# Patient Record
Sex: FEMALE | URBAN - METROPOLITAN AREA
[De-identification: names, ages, dates, MRNs, and addresses within clinical notes are randomized per-mention and may not be internally consistent; named-entity substitution may affect disease eponyms.]

---

## 2017-02-15 ENCOUNTER — TRANSFERRED RECORDS (OUTPATIENT)
Dept: HEALTH INFORMATION MANAGEMENT | Facility: CLINIC | Age: 47
End: 2017-02-15

## 2017-03-02 ENCOUNTER — TRANSFERRED RECORDS (OUTPATIENT)
Dept: HEALTH INFORMATION MANAGEMENT | Facility: CLINIC | Age: 47
End: 2017-03-02

## 2017-03-13 ENCOUNTER — MEDICAL CORRESPONDENCE (OUTPATIENT)
Dept: HEALTH INFORMATION MANAGEMENT | Facility: CLINIC | Age: 47
End: 2017-03-13

## 2017-04-27 ENCOUNTER — CARE COORDINATION (OUTPATIENT)
Dept: GASTROENTEROLOGY | Facility: CLINIC | Age: 47
End: 2017-04-27

## 2017-04-27 NOTE — PROGRESS NOTES
New referral records reviewed on 4/24/2017 by Dr. Springer.    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    Called [facility] and left a message with Trista Bartlett CNP nurse with the recommendations from Dr. Springer.  Asked that the nurse contact the patient and inform the patient.    Contact information left if any questions/concerns.    Claudia May LPN  Advanced [facility]~ Panc/Bili  Dr. Springer, Dr. Dixon & Dr. Walter  [phone]

## 2017-12-06 PROBLEM — G89.29 ABDOMINAL PAIN, CHRONIC, RIGHT UPPER QUADRANT: Status: ACTIVE | Noted: 2017-12-06

## 2017-12-06 PROBLEM — R10.11 ABDOMINAL PAIN, CHRONIC, RIGHT UPPER QUADRANT: Status: ACTIVE | Noted: 2017-12-06

## 2017-12-06 PROBLEM — Z79.891 LONG-TERM CURRENT USE OF OPIATE ANALGESIC: Status: ACTIVE | Noted: 2017-12-06

## 2018-01-17 ENCOUNTER — TELEPHONE (OUTPATIENT)
Dept: NEUROLOGY | Facility: CLINIC | Age: 48
End: 2018-01-17

## 2018-01-17 NOTE — TELEPHONE ENCOUNTER
"I called Ivonne Giron to discuss this further.  I have also called Edilson Vides, our Botox vendor, and left a message for him to please return my call regarding a patient with possible allergic reaction after her botox injection.    Ivonne indicates she also had an unusual reaction to the botox injection at her September visit.  She said she felt a sharp pain in her head, became dizzy, and \"couldn't see\".  I was there that day witnessing the procedure, but do not recall a reaction, or at least a verbalization from her of any problems.  She said she had to pull off the road because she felt unsafe to drive.  She called the office about 30 minutes after the procedure was done, and spoke with Dr. Man.  He thought that she sounded like she was having a migraine headache but that the botox was working to minimize the symptomology.    Her phone call today was to document that her last botox injections were on 12/28/2017, and beginning on January 10th, 2018, she developed redness and edema in both eyelids, with droopiness of the eyelids \"draping over her eyelashes\".  She denies SOB, hives, tongue swelling, or any other symptoms.  She is only having these symptoms at her eyes, not at other botox injection sites.      She also said she has an allergy to pesticides, and when they spray around her house, it causes her to have a reaction.      She has been getting botox injections for headaches for approximately 13 years.      She has been taking benedryl for a week at the advice of her PCP, and was advised to call us.    The botox literature indicates the following regarding hypersensitivity reactions:  \"Serious and or immediate hypersensitivity reactions have been reported.  These reactions including anaphylaxis, serum sickness, urticaria, soft tissue edema, and dyspnea.  If such a reaction occurs further injection of Botox should be discontinued and appropriate medical therapy immediately instituted.  One fatal " "case of anaphylaxis has been reported in which lidocaine was used as the diluent and consequently the causal agent cannot reliably be determined.\"    I indicated that she should continue taking benedryl until the reaction stops.  I also indicated that I would discuss it with the botox supplier, but that we likely cannot continue to give her botox injections for her headaches.  She is very concerned about this, as there were no other modalities for her headaches.      "

## 2018-01-17 NOTE — TELEPHONE ENCOUNTER
I called her back and left message to call back times 2. Was finally able to reach her. She said it started about a week ago. She had Botox 12/28/17 and has been receiving it for several years. She said it started with itching of her eyes, redness. She has been using warm wash cloths. She said her eyelids are droopy and you can hardly see her eyes. I told her she should see Provider close to home and she said she did and they told her to call us as they are not familiar with side effects of Botox. I told her I will confer with Dr. Grimm.

## 2018-01-18 ENCOUNTER — PROCEDURE VISIT (OUTPATIENT)
Dept: PAIN MEDICINE | Facility: CLINIC | Age: 48
End: 2018-01-18
Attending: ANESTHESIOLOGY
Payer: COMMERCIAL

## 2018-01-18 VITALS
OXYGEN SATURATION: 94 % | DIASTOLIC BLOOD PRESSURE: 78 MMHG | HEART RATE: 58 BPM | SYSTOLIC BLOOD PRESSURE: 123 MMHG | RESPIRATION RATE: 16 BRPM

## 2018-01-18 DIAGNOSIS — R10.11 ABDOMINAL PAIN, CHRONIC, RIGHT UPPER QUADRANT: ICD-10-CM

## 2018-01-18 DIAGNOSIS — M79.18 MYOFASCIAL MUSCLE PAIN: Primary | ICD-10-CM

## 2018-01-18 DIAGNOSIS — G89.29 ABDOMINAL PAIN, CHRONIC, RIGHT UPPER QUADRANT: ICD-10-CM

## 2018-01-18 PROCEDURE — 76882 US LMTD JT/FCL EVL NVASC XTR: CPT

## 2018-01-18 PROCEDURE — 2500000200 HC RX 250 WO HCPCS: Performed by: ANESTHESIOLOGY

## 2018-01-18 PROCEDURE — 6360000200 HC RX 636 W HCPCS (ALT 250 FOR IP)

## 2018-01-18 PROCEDURE — 20552 NJX 1/MLT TRIGGER POINT 1/2: CPT

## 2018-01-18 RX ORDER — GABAPENTIN 300 MG/1
300 CAPSULE ORAL 3 TIMES DAILY
COMMUNITY
End: 2018-03-06 | Stop reason: SDUPTHER

## 2018-01-18 RX ORDER — TRIAMCINOLONE ACETONIDE 40 MG/ML
40 INJECTION, SUSPENSION INTRA-ARTICULAR; INTRAMUSCULAR ONCE
Status: COMPLETED | OUTPATIENT
Start: 2018-01-18 | End: 2018-01-18

## 2018-01-18 RX ORDER — BUPIVACAINE HYDROCHLORIDE 5 MG/ML
30 INJECTION, SOLUTION EPIDURAL; INTRACAUDAL ONCE
Status: COMPLETED | OUTPATIENT
Start: 2018-01-18 | End: 2018-01-18

## 2018-01-18 RX ADMIN — TRIAMCINOLONE ACETONIDE 40 MG: 40 INJECTION, SUSPENSION INTRA-ARTICULAR; INTRAMUSCULAR at 09:13

## 2018-01-18 RX ADMIN — BUPIVACAINE HYDROCHLORIDE 30 ML: 5 INJECTION, SOLUTION EPIDURAL; INTRACAUDAL at 09:13

## 2018-01-18 ASSESSMENT — PAIN SCALES - GENERAL: PAINLEVEL: 6

## 2018-01-18 ASSESSMENT — PAIN DESCRIPTION - DESCRIPTORS: DESCRIPTORS: SHARP;DULL

## 2018-01-18 NOTE — PROGRESS NOTES
"Procedure performed:  Trigger point injections of bilateral rectus abdominus muscles under US guidance    Preoperative diagnosis:  1. Myofascial muscle pain    2. Abdominal pain, chronic, right upper quadrant      Postoperative diagnosis:  1. Myofascial muscle pain    2. Abdominal pain, chronic, right upper quadrant      Findings:  Successful procedure under US guidance    Anesthesia:  Local    Complications:  None    History of present illness:  The patient is a 39-year-old female who presents today for trigger point injection under ultrasound guidance in the abdomen.  She has had a couple of injections for what is presumed myofascial pain, including TAP blocks as well as trigger point injections near the umbilicus.  These have been beneficial but only for short periods.  The thought was to focus with the ultrasound into the abdominal wall musculature.  The patient describes pain that starts near her lower sternum and radiates inferiorly to just the above the umbilicus and then outwards.  She indicates it comes and goes.  She does have pain-free periods.  When the pain comes on, which is at least once a day, and last for approximately 15-20 minutes.  She feels like her abdominal wall gets \"tight\".  She takes the tramadol during these times which does help but does not totally alleviate the pain.  She does have a history of cholecystectomy with some scars in the regions of the pain.  On exam she has very mild tenderness in her upper quadrants.  No visual deformity.  No palpable deformity.  Skin is intact.  Well-healed scars. Given my exam findings I do think this is a predominantly myofascial pain.  It is difficult the fact that it waxes and wanes with no constant symptom at hand.  I do think that performing trigger points under ultrasound guidance to effectively target the bilateral rectus abdominis muscles would be an appropriate step.  All risks benefits and alternatives to the procedure were discussed with " patient consent was obtained.    Procedure in detail:  The patient was brought to the fluoroscopy suite where a timeout was performed to ensure correct patient correct procedure. The patient was then placed in the seated position and the trigger points reidentified.  There were a total of 5 trigger points.  These were located within the bilateral rectus abdominus muscles.  The area of the triggerpoints was sterilely prepped with alcohol.  Next, a 25-gauge 1-1/2 inch needle was advanced to no more a depth than 1 inch and then a a total of 9 cc of 0.5% bupivacaine + 40 mg Kenalogwas distributed equally between the trigger points.  Hemostasis was confirmed post procedure.  The patient was observed in the recovery area prior to being discharged in stable condition.    Plan:  CNP , consider repeat if effective.    KODY ELISE MD

## 2018-01-23 DIAGNOSIS — G89.29 ABDOMINAL PAIN, CHRONIC, RIGHT UPPER QUADRANT: ICD-10-CM

## 2018-01-23 DIAGNOSIS — R10.11 ABDOMINAL PAIN, CHRONIC, RIGHT UPPER QUADRANT: ICD-10-CM

## 2018-01-23 DIAGNOSIS — Z79.891 LONG-TERM CURRENT USE OF OPIATE ANALGESIC: ICD-10-CM

## 2018-01-23 NOTE — TELEPHONE ENCOUNTER
Ivonne needs to fill Tramadol on 1-25 because she is taking her mother to [place]. Her fill date is 1-27. She can only get #180 if she fills on 1-25. I asked her to call me back so I could explain this. Ivonne said she will fill on 1-27 instead.

## 2018-01-24 RX ORDER — TRAMADOL HYDROCHLORIDE 50 MG/1
TABLET ORAL
Qty: 240 TABLET | Refills: 0 | Status: SHIPPED | OUTPATIENT
Start: 2018-01-24 | End: 2018-05-26 | Stop reason: ALTCHOICE

## 2018-01-24 RX ORDER — TRAMADOL HYDROCHLORIDE 50 MG/1
TABLET ORAL
Qty: 240 TABLET | Refills: 0 | Status: SHIPPED | OUTPATIENT
Start: 2018-01-24 | End: 2018-03-01 | Stop reason: SDUPTHER

## 2018-01-25 ENCOUNTER — TELEPHONE (OUTPATIENT)
Dept: NEUROLOGY | Facility: CLINIC | Age: 48
End: 2018-01-25

## 2018-01-25 NOTE — TELEPHONE ENCOUNTER
"Franklin Ro,    I spoke with representative from [employer] about Ivonne's two events that are concerning for a reaction to the Botox given at her last visit.      The representative, Maria Isabel, (filling in for Edilson, our regular representative), said that \"eyelid edema\" is listed in the Adverse drug reaction section of the drug information.    Puzzling is that her first reaction in September did not involve eyelid edema, and the reaction occurred within 20-30 minutes of the botox injection treatment done by Dr. Man.  The eyelid edema began on 1/10/2018, nearly two weeks after the botox injection.  It is unclear that this was the causative reason, however, the vendor does not recommend continuation of the botox injections for fear that she may have a more severe reaction such as anaphylaxis.      She will need to resume seeing Dr. Man for her headaches and start oral medications, and should not be scheduled for botox injections, as I think the risk is too great.          Franklin Ro,  I texted him again today to call me back.  Hopefully I'll hear back today or tomorrow.  Thanks  Solange  "

## 2018-01-31 ENCOUNTER — TELEPHONE (OUTPATIENT)
Dept: NEUROLOGY | Facility: CLINIC | Age: 48
End: 2018-01-31

## 2018-01-31 NOTE — TELEPHONE ENCOUNTER
"I spoke with Ivonne today about the conversation I had with the Botox supplier.    I asked how long her eyelids were red and edematous, and she indicated it lasted about 4 days that she needed to take benedryl for the reaction. Again, experienced no SOB, tongue swelling, hives, or other sign of anaphylaxis.    I explained that since the eyelid edema is a listed reaction to botox, and she claims to have felt \"not right\" following a previous treatment, I explained that subsequent exposure to an agent that causes a type IV hypersensitivity reaction can cause severe reaction that requires emergency treatment.  I said that the botox supplier recommended that we do not take that chance and not continue this treatment therapy.    So she is concerned what will be done for her headache treatment.  I was thinking that Dr. Man knows her case best, and I recommend that she followup with an office visit with him to discuss other options.      Dr. Man, is that OK with you?  If so, Ija can you call her to schedule an office visit with Dr. Man?      Let me know what you think,  Solange      "

## 2018-03-01 DIAGNOSIS — R10.11 ABDOMINAL PAIN, CHRONIC, RIGHT UPPER QUADRANT: ICD-10-CM

## 2018-03-01 DIAGNOSIS — R10.10 PAIN OF UPPER ABDOMEN: ICD-10-CM

## 2018-03-01 DIAGNOSIS — Z79.891 LONG-TERM CURRENT USE OF OPIATE ANALGESIC: ICD-10-CM

## 2018-03-01 DIAGNOSIS — G89.29 ABDOMINAL PAIN, CHRONIC, RIGHT UPPER QUADRANT: ICD-10-CM

## 2018-03-01 RX ORDER — GABAPENTIN 300 MG/1
CAPSULE ORAL
Qty: 90 CAPSULE | Refills: 3 | Status: SHIPPED | OUTPATIENT
Start: 2018-03-01 | End: 2018-06-19 | Stop reason: SDUPTHER

## 2018-03-01 RX ORDER — TRAMADOL HYDROCHLORIDE 50 MG/1
TABLET ORAL
Qty: 240 TABLET | Refills: 0 | Status: SHIPPED | OUTPATIENT
Start: 2018-03-01 | End: 2018-05-26 | Stop reason: ALTCHOICE

## 2018-03-01 NOTE — TELEPHONE ENCOUNTER
Her appointment with Roberto is 3-6 so gabapentin can be refilled but wait on Tramadol until appointment

## 2018-03-06 ENCOUNTER — OFFICE VISIT (OUTPATIENT)
Dept: PAIN MEDICINE | Facility: CLINIC | Age: 48
End: 2018-03-06
Attending: ANESTHESIOLOGY
Payer: COMMERCIAL

## 2018-03-06 VITALS
HEART RATE: 70 BPM | DIASTOLIC BLOOD PRESSURE: 85 MMHG | RESPIRATION RATE: 16 BRPM | SYSTOLIC BLOOD PRESSURE: 127 MMHG | OXYGEN SATURATION: 96 %

## 2018-03-06 DIAGNOSIS — G89.29 ABDOMINAL PAIN, CHRONIC, RIGHT UPPER QUADRANT: Primary | ICD-10-CM

## 2018-03-06 DIAGNOSIS — Z79.891 LONG-TERM CURRENT USE OF OPIATE ANALGESIC: ICD-10-CM

## 2018-03-06 DIAGNOSIS — R10.11 ABDOMINAL PAIN, CHRONIC, RIGHT UPPER QUADRANT: Primary | ICD-10-CM

## 2018-03-06 PROCEDURE — 99212 OFFICE O/P EST SF 10 MIN: CPT

## 2018-03-06 RX ORDER — TRAMADOL HYDROCHLORIDE 50 MG/1
50-100 TABLET ORAL EVERY 6 HOURS PRN
Qty: 240 TABLET | Refills: 0 | Status: SHIPPED | OUTPATIENT
Start: 2018-03-27 | End: 2018-05-26 | Stop reason: ALTCHOICE

## 2018-03-06 RX ORDER — TRAMADOL HYDROCHLORIDE 50 MG/1
50-100 TABLET ORAL EVERY 6 HOURS PRN
Qty: 240 TABLET | Refills: 0 | Status: SHIPPED | OUTPATIENT
Start: 2018-05-26 | End: 2018-05-26 | Stop reason: ALTCHOICE

## 2018-03-06 RX ORDER — IBUPROFEN 200 MG
200 TABLET ORAL AS NEEDED
COMMUNITY
End: 2024-12-18 | Stop reason: HOSPADM

## 2018-03-06 RX ORDER — TRAMADOL HYDROCHLORIDE 50 MG/1
50-100 TABLET ORAL EVERY 6 HOURS PRN
Qty: 240 TABLET | Refills: 0 | Status: SHIPPED | OUTPATIENT
Start: 2018-04-26 | End: 2018-05-26 | Stop reason: ALTCHOICE

## 2018-03-06 RX ORDER — BUTALBITAL, ACETAMINOPHEN AND CAFFEINE 50; 325; 40 MG/1; MG/1; MG/1
1 TABLET ORAL EVERY 4 HOURS PRN
COMMUNITY
End: 2020-06-02 | Stop reason: ALTCHOICE

## 2018-03-06 ASSESSMENT — PAIN - FUNCTIONAL ASSESSMENT: PAIN_FUNCTIONAL_ASSESSMENT: 0-10

## 2018-03-06 ASSESSMENT — ENCOUNTER SYMPTOMS
MYALGIAS: 1
CONSTIPATION: 0
DIARRHEA: 0
FEVER: 0
ABDOMINAL PAIN: 1
ABDOMINAL DISTENTION: 0

## 2018-03-06 ASSESSMENT — PAIN DESCRIPTION - DESCRIPTORS: DESCRIPTORS: OTHER (COMMENT)

## 2018-03-06 NOTE — PROGRESS NOTES
Subjective     Patient ID: Ivonne Giron is a 47 y.o. female.    HPI  Here for med review and f/u on injections. She had TPI under US to her abd muscles on 1/18/18.  She now had more needle poke type pain on the sides by her ribs but the middle of her abd felt better.  She was worried because the sharp pain was surprising and not like her usual pain. She isn't thinking it helped due to the new sharp pain but admits the middle of her abd is better.    Meds continue to help with her pain episodes. She asks more questions today about why she has this pain.      Review of Systems   Constitutional: Negative for fever.   Gastrointestinal: Positive for abdominal pain. Negative for abdominal distention, constipation and diarrhea.   Musculoskeletal: Positive for myalgias.       Objective   /85 (BP Location: Right arm, Patient Position: Sitting)   Pulse 70   Resp 16   SpO2 96%       Current Outpatient Prescriptions:   •  butalbital-acetaminophen-caff (FIORICET, ESGIC) -40 mg, Take 1 tablet by mouth every 4 hours., Disp: , Rfl:   •  calcium carbonate-vitamin D3 (CALCIUM 500 WITH D) 500 mg(1,250mg) -400 unit tablet, Take 1 tab/cap by mouth daily., Disp: , Rfl:   •  diclofenac sodium (VOLTAREN) 1 % gel, Apply 2 g topically 4 (four) times a day as needed (pain)., Disp: 1 Tube, Rfl: 3  •  FLUoxetine (PROzac) 20 mg capsule, Take 20 mg by mouth daily., Disp: , Rfl:   •  gabapentin (NEURONTIN) 300 mg capsule, TAKE ONE CAPSULE BY MOUTH THREE TIMES DAILY, Disp: 90 capsule, Rfl: 3  •  hydrochlorothiazide (HYDRODIURIL) 25 mg tablet, Take 10 mg by mouth daily., Disp: , Rfl:   •  ibuprofen (MOTRIN IB) 200 mg tablet, Take 500 mg by mouth as needed., Disp: , Rfl:   •  losartan (COZAAR) 100 mg tablet, Take by mouth daily.  , Disp: , Rfl:   •  prenatal 25-iron fum-folic-dha (PRENATAL-1) -200 mg-mcg-mg capsule, Take 1 capsule by mouth daily., Disp: , Rfl:   •  prenatal vits96-iron fum-folic 27 mg iron- 800 mcg  tablet tablet, Take 1 tablet by mouth daily., Disp: , Rfl:   •  traMADol (ULTRAM 50) 50 mg tablet, Take 2 tablets every 6 hours by oral route., Disp: , Rfl:   •  traMADol (ULTRAM 50) 50 mg tablet, Take 1-2 tablets every 4 hours PRN. Max 8 daily., Disp: 240 tablet, Rfl: 0  •  traMADol (ULTRAM 50) 50 mg tablet, TAKE ONE TO TWO TABLETS BY MOUTH EVERY 4 HOURS AS NEEDED *MAXIMUM  OF  EIGHT  TABLETS  DAILY*, Disp: 240 tablet, Rfl: 0  •  traMADol (ULTRAM 50) 50 mg tablet, Take 1 tablet (50 mg total) by mouth every 4 (four) hours as needed for pain scale 4-7/10, 4-5/8., Disp: 240 tablet, Rfl: 0    Physical Exam   Constitutional: She is oriented to person, place, and time. She appears well-developed and well-nourished. No distress.   HENT:   Head: Normocephalic.   Eyes: Conjunctivae are normal.   Neck: Normal range of motion. Neck supple.   Cardiovascular: Normal rate and regular rhythm.    No murmur heard.  Pulmonary/Chest: Effort normal and breath sounds normal. No respiratory distress.   Abdominal: She exhibits no distension. There is tenderness.       Musculoskeletal: Normal range of motion. She exhibits no edema, tenderness or deformity.        Neurological: She is alert and oriented to person, place, and time.   Skin: Skin is warm and dry. No rash noted.   Psychiatric: She has a normal mood and affect. Her behavior is normal. Judgment and thought content normal.   Nursing note and vitals reviewed.      Assessment/Plan     1. Abdominal pain, chronic, right upper quadrant    2. Long-term current use of opiate analgesic      Continue tramadol as before.  I suggested voltaren gel but it isn't covered so she may try OTC creams.  Discussed the w/u on her pain and how GI ordered MRCP and this wasn't done and her labs arent in the computer either.  It could be myofascial so encouraged cream to area especially since it comes on at different times.     DISCUSSION  Today's plan was a combined effort between the patient and  myself. The patient understood the plan, verbally consented, and agreed to participate. An After Visit Summary with special instructions/information regarding today's office visit was given to the patient (see patient instructions).     The diagnostic assessment has been reviewed. Patient and/or patient's surrogate has expressed a good understanding of the assessment and recommendations from today's visit. There are no apparent barriers to understanding this information. Patient’s questions were addressed.    Patient has been advised to contact this office or the answering service with questions or concerns that may arise. Patient has been advised to follow up with Primary Care Provider for general medical needs.     A total of 15 minutes was required for this visit. Greater than 50% of this time was spent in direct face to face communication with the patient and/or surrogate in order to provide counseling regarding meds and possible creams she can use to her abd.    GOKUL ASHTON, CNP

## 2018-03-08 ENCOUNTER — OFFICE VISIT (OUTPATIENT)
Dept: NEUROLOGY | Facility: CLINIC | Age: 48
End: 2018-03-08
Payer: COMMERCIAL

## 2018-03-08 VITALS — OXYGEN SATURATION: 93 % | DIASTOLIC BLOOD PRESSURE: 85 MMHG | SYSTOLIC BLOOD PRESSURE: 136 MMHG | HEART RATE: 78 BPM

## 2018-03-08 DIAGNOSIS — G89.29 CHRONIC ABDOMINAL PAIN: ICD-10-CM

## 2018-03-08 DIAGNOSIS — G43.019 INTRACTABLE MIGRAINE WITHOUT AURA AND WITHOUT STATUS MIGRAINOSUS: Primary | ICD-10-CM

## 2018-03-08 DIAGNOSIS — R10.9 CHRONIC ABDOMINAL PAIN: ICD-10-CM

## 2018-03-08 DIAGNOSIS — I10 ESSENTIAL HYPERTENSION: ICD-10-CM

## 2018-03-08 DIAGNOSIS — Z86.69 HISTORY OF SEIZURE DISORDER: ICD-10-CM

## 2018-03-08 PROCEDURE — 99215 OFFICE O/P EST HI 40 MIN: CPT | Performed by: PSYCHIATRY & NEUROLOGY

## 2018-03-08 RX ORDER — PROPRANOLOL HYDROCHLORIDE 60 MG/1
60 CAPSULE, EXTENDED RELEASE ORAL DAILY
Qty: 30 CAPSULE | Refills: 11 | Status: SHIPPED | OUTPATIENT
Start: 2018-03-08 | End: 2018-04-17 | Stop reason: ALTCHOICE

## 2018-03-08 ASSESSMENT — ENCOUNTER SYMPTOMS
HEADACHES: 1
BRUISES/BLEEDS EASILY: 1
ABDOMINAL PAIN: 1

## 2018-03-08 ASSESSMENT — PAIN SCALES - GENERAL: PAINLEVEL: 5

## 2018-03-08 NOTE — PROGRESS NOTES
Patient ID: Ivonne Giron is a 47 y.o. female.    HPI  She comes back at this time for further review of migraines.  She notes that she has had difficulty with injections.  Previously she actually done well with the Botox.  I did Botox injections per the standard protocol September 28, 2017.  On her way home she developed bad migraine.  She had called about that but that was just a bad migraine but no other unusual reaction been noted.  She believes that the headaches were pretty decent after the injection in September.    She next received Botox injection December 28 by Dr. Grimm.  I was out of town at that time and the arrangement was per Dr. Grimm to do the injection.  Apparently about a week or more after the Botox injection she began to develop itching of her eyes and redness.  She also noted a drooping of her eyelids.  There were several contacts with the Botox vendors because of possibility of reactions.  Eventually the question of may be not giving further Botox was suggested because of concern with regard to possible adverse reaction to the Botox.    At this point time patient notes that she is having the headaches on almost a daily basis.  Especially frontal and there are 7-10 out of 10 in severity.  The headaches do last all day long.  She has felt that maybe there was little bit of ptosis of her eyelids.    Review of Systems   Gastrointestinal: Positive for abdominal pain.   Neurological: Positive for headaches.   Hematological: Bruises/bleeds easily.   All other systems reviewed and are negative.       Objective:  Chest was clear to auscultation.  Cardiac examination was unremarkable.    Mental status reveals normal speech and language.  Attention and concentration were normal.  Fund of knowledge is normal.  Recent remote recall were normal.  Patient was oriented ×3.    Extraocular movements are full and conjugate.  Facial motor function is good in the upper lower midface.  No ptosis  was noted no nystagmus is noted.  Today I am not really able to detect any clear-cut ptosis.  The upper lid just touches the upper aspect of the iris bilaterally.  I also had patient's daughter went out to get the 's license from the car.  We compared to the patient today compared to the 's license which actually was picture from a November 30.  On the 's license it almost looks like there is a slight amount of ptosis of the right lead.  It appears that she is actually better now if anything then she was before.  I cannot  any clear-cut asymmetry otherwise.  Sternocleidomastoid and trapezius are intact.  Strength is good in the arms and legs.  Tone is normal gait is normal.  No other abnormalities are noted.    Neurologic Exam    Assessment/Plan:        1.  Refractory migraines which had been nicely responsive to Botox    2.  Patient had a migraine after Botox September 28.  3.  Possibility of adverse reaction to Botox with the injection December 28.  She developed redness of her eyes and eyelids with possible reaction about a week or week and a half after the Botox.  4.  Essential tremor    5.  History of chronic chronic abdominal pain    6.  History of seizures      PLAN    1.  Patient has been tried on amitriptyline, Depakote and topiramate in the past.  Today I am going to switch gears and given some propranolol.  She does not believe she is ever had that one.  Start with propranolol 60 mg long-acting daily possibly going up to 60 twice daily.  2.  Follow-up with Mary Jo Fowler within 2 months also for headache management.  Possibility of other medication such as calcium channel blockers or Periactin also could be considered.  For now were going to not go back to the Botox.      The plan for this visit has been reviewed.  The patient and/or the patient's surrogate has expressed a good understanding of the assessment and recommendation from today's visit.  Verbal and/or written  instructions have been included.  No apparent barriers to understanding this information.  Patient's questions were addressed.  A total of 40 minutes was required for this visit.  Greater than 50% of this time was spent in direct face-to-face communication with the patient and/or surrogate caregiver to provide counseling and coordination of care for migraines.

## 2018-03-14 ENCOUNTER — TELEPHONE (OUTPATIENT)
Dept: NEUROLOGY | Facility: CLINIC | Age: 48
End: 2018-03-14

## 2018-03-14 NOTE — TELEPHONE ENCOUNTER
Patient left a message that the new medication is working for the headaches.  It looks like she was given propranolol la 60 mg on 3/8/2018.  I left a message for the patient after visiting with Dr. Man.  She is to give the medication more time to build up a level in her system.  If she continues to have problems we can schedule a follow up with Mary Jo.

## 2018-03-20 PROCEDURE — 87088 URINE BACTERIA CULTURE: CPT | Performed by: NURSE PRACTITIONER

## 2018-03-22 ENCOUNTER — LAB REQUISITION (OUTPATIENT)
Dept: LAB | Facility: HOSPITAL | Age: 48
End: 2018-03-22
Payer: COMMERCIAL

## 2018-03-22 DIAGNOSIS — R35.0 FREQUENCY OF MICTURITION: ICD-10-CM

## 2018-03-24 LAB — BACTERIA UR CULT: NORMAL

## 2018-03-29 ENCOUNTER — TELEPHONE (OUTPATIENT)
Dept: NEUROLOGY | Facility: CLINIC | Age: 48
End: 2018-03-29

## 2018-03-29 NOTE — TELEPHONE ENCOUNTER
Patient calling to ask for something for headaches.  I returned the call.  Patient was advised that she needs to give the propranolol a couple more weeks to build up a level.  Then if she is still having issues then she should be scheduled top see Mary Jo.  Patient stated understanding.

## 2018-04-09 ENCOUNTER — TELEPHONE (OUTPATIENT)
Dept: NEUROLOGY | Facility: CLINIC | Age: 48
End: 2018-04-09

## 2018-04-09 NOTE — TELEPHONE ENCOUNTER
Suggested pt be seen at her local clinic for help with her migraines . She said she was told that she needs to talk with our office . Pt was given an appt next week so she can get a new referal card from [facility] to be seen ,

## 2018-04-09 NOTE — TELEPHONE ENCOUNTER
Pt said that she had also tried botox but had and allergic reaction to it . We are checking on moving her follow up sooner but [facility] may cover remaining cost after medicaid.  will check on this .

## 2018-04-09 NOTE — TELEPHONE ENCOUNTER
Pt called . She wants something for her headaches. She has used all the propranolol and it didn't help . She has a follow up with Mary Jo Fowler CNP in may . Told pt Mary Jo cannot give her anything until she is seen, Will ask scheduling to get her in sooner if possible.

## 2018-04-17 ENCOUNTER — OFFICE VISIT (OUTPATIENT)
Dept: NEUROLOGY | Facility: CLINIC | Age: 48
End: 2018-04-17
Payer: COMMERCIAL

## 2018-04-17 VITALS
BODY MASS INDEX: 35.63 KG/M2 | RESPIRATION RATE: 16 BRPM | DIASTOLIC BLOOD PRESSURE: 77 MMHG | HEIGHT: 67 IN | HEART RATE: 82 BPM | OXYGEN SATURATION: 94 % | WEIGHT: 227 LBS | SYSTOLIC BLOOD PRESSURE: 124 MMHG

## 2018-04-17 DIAGNOSIS — G43.019 INTRACTABLE MIGRAINE WITHOUT AURA AND WITHOUT STATUS MIGRAINOSUS: Primary | ICD-10-CM

## 2018-04-17 PROCEDURE — 99214 OFFICE O/P EST MOD 30 MIN: CPT | Performed by: NURSE PRACTITIONER

## 2018-04-17 RX ORDER — CYPROHEPTADINE HYDROCHLORIDE 4 MG/1
4 TABLET ORAL 3 TIMES DAILY PRN
Qty: 30 TABLET | Refills: 1 | Status: SHIPPED | OUTPATIENT
Start: 2018-04-17 | End: 2018-04-19 | Stop reason: SDUPTHER

## 2018-04-17 ASSESSMENT — ENCOUNTER SYMPTOMS
COLOR CHANGE: 0
DYSURIA: 0
SHORTNESS OF BREATH: 0
FEVER: 0
VOMITING: 0
HEADACHES: 1
ARTHRALGIAS: 0
BACK PAIN: 0
ABDOMINAL PAIN: 0
CHILLS: 0
HEMATURIA: 0
COUGH: 0
SEIZURES: 0
PALPITATIONS: 0
SORE THROAT: 0
EYE PAIN: 0
DIZZINESS: 1

## 2018-04-17 ASSESSMENT — PAIN SCALES - GENERAL: PAINLEVEL: 5

## 2018-04-17 NOTE — PROGRESS NOTES
Headache Exam    04/17/18  2:03 PM    Chief Complaint   Patient presents with   • Migraine       HPI:  Ivonne Miriam Lynnette Giron is a 47 y.o. female who presents to the clinic for follow up for migraine.     Todays patient notes that she is having the headaches on almost a daily basis.  Especially frontal area they are 7-10 out of 10 in severity. The pain is throbbing and stabbing.The headaches do last all day long. She is positive for sensitivity to light and sound. She is nauseated with these episodes. She notes that she had the worst headache after her last botox treatment, but the headache went away in about two hours.    At her last appointment approximately 2 months ago Dr. Man put her on propanolol, she notes no side effects however the medication did not appear to help her.  Her last migraine was 2 days ago at that time she took Motrin and stayed in a dark quiet place.      Histories:    Medical:    Past Medical History:   Diagnosis Date   • Abdominal pain, chronic, generalized    • Accelerated essential hypertension    • Chronic pain disorder    • Intercostal neuropathic pain    • Migraine    • Pain     surgical sight pain since gallbladder surgery         Surgical:    Past Surgical History:   Procedure Laterality Date   • CHOLECYSTECTOMY           Family:    Family History   Problem Relation Age of Onset   • Arthritis Mother    • Hypertension Father    • Hypertension Other    • Tuberculosis Other    • Arthritis Other    • Diabetes Other    • Heart disease Other    • Migraines Other    • Migraines Mother's Sister          Social:    Social History     Social History   • Marital status: Single     Spouse name: N/A   • Number of children: N/A   • Years of education: N/A     Occupational History   • Not on file.     Social History Main Topics   • Smoking status: Never Smoker   • Smokeless tobacco: Never Used   • Alcohol use No   • Drug use: No   • Sexual activity: Defer     Other Topics Concern   • Not on  file     Social History Narrative   • No narrative on file       Allergies:    Allergies   Allergen Reactions   • Indomethacin      HIVES   • Prednisone      N/V   • Sulfa (Sulfonamide Antibiotics)      HIVES       Current Medications:    Current Outpatient Prescriptions   Medication Sig Dispense Refill   • butalbital-acetaminophen-caff (FIORICET, ESGIC) -40 mg Take 1 tablet by mouth every 4 (four) hours as needed.       • calcium carbonate-vitamin D3 (CALCIUM 500 WITH D) 500 mg(1,250mg) -400 unit tablet Take 1 tab/cap by mouth daily.     • FLUoxetine (PROzac) 20 mg capsule Take 20 mg by mouth daily.     • gabapentin (NEURONTIN) 300 mg capsule TAKE ONE CAPSULE BY MOUTH THREE TIMES DAILY 90 capsule 3   • ibuprofen (MOTRIN IB) 200 mg tablet Take 200 mg by mouth as needed.       • losartan (COZAAR) 100 mg tablet Take by mouth daily.       • prenatal 25-iron fum-folic-dha (PRENATAL-1) -200 mg-mcg-mg capsule Take 1 capsule by mouth daily.     • traMADol (ULTRAM 50) 50 mg tablet Take 1-2 tablets every 4 hours PRN. Max 8 daily. 240 tablet 0   • traMADol (ULTRAM 50) 50 mg tablet TAKE ONE TO TWO TABLETS BY MOUTH EVERY 4 HOURS AS NEEDED *MAXIMUM  OF  EIGHT  TABLETS  DAILY* 240 tablet 0   • cyproheptadine (PERIACTIN) 4 mg tablet Take 1 tablet (4 mg total) by mouth 3 (three) times a day as needed for allergies. Take one tab in the morning and one at night for a week. If tolerating my use three times a day. 30 tablet 1   • hydrochlorothiazide (HYDRODIURIL) 25 mg tablet Take 25 mg by mouth daily.       • traMADol (ULTRAM 50) 50 mg tablet Take 1 tablet (50 mg total) by mouth every 4 (four) hours as needed for pain scale 4-7/10, 4-5/8. 240 tablet 0   • traMADol (ULTRAM 50) 50 mg tablet Take 1-2 tablets ( mg total) by mouth every 6 (six) hours as needed (pain). 240 tablet 0   • [START ON 4/26/2018] traMADol (ULTRAM 50) 50 mg tablet Take 1-2 tablets ( mg total) by mouth every 6 (six) hours as needed (pain).  240 tablet 0   • [START ON 5/26/2018] traMADol (ULTRAM 50) 50 mg tablet Take 1-2 tablets ( mg total) by mouth every 6 (six) hours as needed (pain). 240 tablet 0     No current facility-administered medications for this visit.        Home Medications:    Prior to Admission medications    Medication Sig Start Date End Date Taking? Authorizing Provider   butalbital-acetaminophen-caff (FIORICET, ESGIC) -40 mg Take 1 tablet by mouth every 4 (four) hours as needed.     Yes Historical Provider, MD   calcium carbonate-vitamin D3 (CALCIUM 500 WITH D) 500 mg(1,250mg) -400 unit tablet Take 1 tab/cap by mouth daily.   Yes Historical Provider, MD   FLUoxetine (PROzac) 20 mg capsule Take 20 mg by mouth daily.   Yes Historical Provider, MD   gabapentin (NEURONTIN) 300 mg capsule TAKE ONE CAPSULE BY MOUTH THREE TIMES DAILY 3/1/18  Yes Roberto Elliott CNP   ibuprofen (MOTRIN IB) 200 mg tablet Take 200 mg by mouth as needed.     Yes Historical Provider, MD   losartan (COZAAR) 100 mg tablet Take by mouth daily.   1/17/13  Yes Conversion Provider   prenatal 25-iron fum-folic-dha (PRENATAL-1) 30975-200 mg-mcg-mg capsule Take 1 capsule by mouth daily.   Yes Historical Provider, MD   propranolol LA (INDERAL LA) 60 mg 24 hr capsule Take 1 capsule (60 mg total) by mouth daily.  Patient taking differently: Take 60 mg by mouth daily.   3/8/18 3/8/19 Yes Donald Man MD   traMADol (ULTRAM 50) 50 mg tablet Take 1-2 tablets every 4 hours PRN. Max 8 daily. 1/24/18  Yes Roberto Elliott CNP   traMADol (ULTRAM 50) 50 mg tablet TAKE ONE TO TWO TABLETS BY MOUTH EVERY 4 HOURS AS NEEDED *MAXIMUM  OF  EIGHT  TABLETS  DAILY* 3/1/18  Yes Roberto Elliott CNP   hydrochlorothiazide (HYDRODIURIL) 25 mg tablet Take 25 mg by mouth daily.      Historical Provider, MD   traMADol (ULTRAM 50) 50 mg tablet Take 1 tablet (50 mg total) by mouth every 4 (four) hours as needed for pain scale 4-7/10, 4-5/8. 12/27/17 1/26/18  Roberto Elliott CNP   traMADol  "(ULTRAM 50) 50 mg tablet Take 1-2 tablets ( mg total) by mouth every 6 (six) hours as needed (pain). 3/27/18 4/26/18  Roberto Elliott CNP   traMADol (ULTRAM 50) 50 mg tablet Take 1-2 tablets ( mg total) by mouth every 6 (six) hours as needed (pain). 4/26/18 5/26/18  Roberto Elliott CNP   traMADol (ULTRAM 50) 50 mg tablet Take 1-2 tablets ( mg total) by mouth every 6 (six) hours as needed (pain). 5/26/18 6/25/18  Roberto Elliott CNP   diclofenac sodium (VOLTAREN) 1 % gel Apply 2 g topically 4 (four) times a day as needed (pain).  Patient not taking: Reported on 4/17/2018 12/8/17 4/17/18  Roberto Elliott CNP       Review of Systems:  Review of Systems   Constitutional: Negative for chills and fever.   HENT: Negative for ear pain and sore throat.    Eyes: Negative for pain and visual disturbance.   Respiratory: Negative for cough and shortness of breath.    Cardiovascular: Negative for chest pain and palpitations.   Gastrointestinal: Negative for abdominal pain and vomiting.   Genitourinary: Negative for dysuria and hematuria.   Musculoskeletal: Negative for arthralgias and back pain.   Skin: Negative for color change and rash.   Neurological: Positive for dizziness and headaches. Negative for seizures and syncope.   All other systems reviewed and are negative.      OBJECTIVE    Labs:    A1c: No results found for: HGBA1C  Basic: No results found for: NA, K, CL, CO2, BUN, CREATININE, GLUCOSE, CALCIUM  CBC with Platelet:  No results found for: WBC, HGB, HCT, PLT, RBC, MCV, MCH, MCHC, RDW, MPV  Comp: No results found for: NA, K, CL, CO2, BUN, CREATININE, GLUCOSE, CALCIUM, PROT, ALBUMIN, AST, ALT, ALKPHOS, BILITOT  Coags: No results found for: PT, APTT, INR  Lipid: No results found for: CHOL, TRIG, HDLC, LDLC, HDL, LDL  TSH: No results found for: TSH    Vital Signs:  /77 (BP Location: Left arm, Patient Position: Sitting, Cuff Size: Reg)   Pulse 82   Resp 16   Ht 1.702 m (5' 7\")   Wt 103 kg (227 " lb)   SpO2 94%   BMI 35.55 kg/m²     Physical Exam   Constitutional: She appears well-developed and well-nourished.   HENT:   Head: Normocephalic.   Eyes: EOM are normal. Pupils are equal, round, and reactive to light.   Neck: Normal range of motion.   Cardiovascular: Normal rate.    Pulmonary/Chest: Breath sounds normal.   Neurological:     General:  Awake, fully participates, sitting upright, engaged.  MS:  LOC is alert, cognitive function grossly intact  CN I: not tested.   CN II: Pupils equal and reactive. 2mm, reactive to ligh Visual fields are full to confrontation.   CN III, IV,VI: extraocular motility is intact. No nystagmus. Normal smooth pursuit and saccade initiation.   CN V: facial sensation is intact to light touch in V1, V2, and V3 distribution.  CN VII: Muscles of facial expression are strong and symmetric.   CN VIII: hearing is grossly intact.  CN IX,X: Palate elevates symmetrically.   CN XI: sternocleidomastoid and trapezius are normal. CN XII: tongue protrudes in midline.   Motor: Deltoid 5/5             Elbow Flexion/Extension 5/5             Wrist Flexion/Extension 5/5              strength 5/5             Hip, knee flexion/extension 5/5             Plantar-, dorsi - flexion 5/5  Sensory:  No sensory loss to light touch, pinprick, vibration in all UE and LE Dermatomes.    Reflex: Bicep 2/4 bilateral              Brachioradialis 2/4 bilateral              Patellar 2/4 bilateral              Achilles 2/4    Plantar response downgoing bilaterally    Coordination:  Finger-to-nose and heel-to-shin accurate with no ataxia, no dysmetria.    Gait:  symmetric, not wide-based       Ct Abdomen Pelvis Renal Stone Protocol    Result Date: 3/20/2018  Narrative: Exam: CT of the abdomen and pelvis without contrast, renal stone protocol, from 03/20/2018. Clinical History:  Flank pain  stone disease suspected;     Comparison(s): 10/3/2017 Technique: With the patient in the prone  position, helical axial  imaging was performed from the level of the adrenal glands through the bladder. Three millimeter axial reconstructions and coronal reaffirmations were constructed and reviewed. Radiation Reduction Technique: Auto mA utilized Findings: Abdomen CT: The lung bases are clear without pleural effusions. The adrenals appear unremarkable.  The liver contains no focal abnormalities nor biliary dilatation. Spleen, pancreas appears normal. The gallbladder has been surgically resected. The kidneys demonstrate no evidence of stones or hydronephrosis. No renal mass is seen. No free air is identified. The bowel appears unremarkable. Pelvis CT: The urinary bladder appears unremarkable. No masses are identified. Appendix area appears unremarkable. The bowel appears unremarkable. Degenerative disc changes are most prominent at L5-S1.     Impression: 1. No acute abnormality identified.      Assessment:  Problem List Items Addressed This Visit        Nervous    Intractable migraine without aura and without status migrainosus - Primary    Relevant Medications    cyproheptadine (PERIACTIN) 4 mg tablet    Other Relevant Orders    Botox Injection        Orders Placed This Encounter   Procedures   • Botox Injection     Standing Status:   Future     Standing Expiration Date:   4/17/2019     Order Specific Question:   Location     Answer:   Chronic Migraines     Discussion:   47 year old female presents today for follow up of migraine headaches. She was last seen in the clinic by Dr. Man. At that time propanolol was prescribed for preventive migraine control. She states no adverse effects however did not notice a difference in migraine pattern. It was noted that if there was not response to propanolol then Periactin should be trial per Dr Man. I will also order her to be seen by Dr Grimm for Xeomin treatment.    Plan:  1. Stop Propanolol  2. Start cyproheptadine 4 mg twice daily if tolerating in 1 week may go up to 3 times  daily  3.  Appointment with Dr. Grimm in 2 weeks for Botox (Xeomin)    Patient is encouraged to keep a headache log including type of headache, frequency of headache, and any triggers.    Patient encouraged to get plenty of sleep.    She  was counseled about maintaining proper body weight, exercise, and healthy lifestyle.    Patient is to stay active both physically and mentally.     She will follow up in this office in two weeks, sooner if needed. Patients questions were addressed and answered.     The plan for this visit has been reviewed.  The patient and/or the patient's surrogate has expressed a good understanding of the assessment and recommendation from today's visit.  Verbal and/or written instructions have been included.  No apparent barriers to understanding this information.  Patient's questions were addressed.  A total of  25 minutes was required for this visit.  Greater than 50% of this time was spent in direct face-to-face communication with the patient and/or surrogate caregiver to provide counseling and coordination of care for their headaches.     A voice recognition program was used to aid in documentation of the record. Sometimes words are not printed exactly as they were spoken. While efforts were made to carefully edit and correct any inaccuracies, some may be present; please take these into context. Please contact the provider if areas are identified.       Mary Jo Fowler, CNP

## 2018-04-17 NOTE — PATIENT INSTRUCTIONS
1.  Stop propanolol.  2.  Start cyproheptadine 4 mg twice daily if tolerating in 1 week may go up to 3 times daily  3.  Appointment with Dr. Grimm in 2 weeks for Botox

## 2018-04-19 ENCOUNTER — TELEPHONE (OUTPATIENT)
Dept: NEUROLOGY | Facility: CLINIC | Age: 48
End: 2018-04-19

## 2018-04-19 DIAGNOSIS — G43.019 INTRACTABLE MIGRAINE WITHOUT AURA AND WITHOUT STATUS MIGRAINOSUS: ICD-10-CM

## 2018-04-19 RX ORDER — CYPROHEPTADINE HYDROCHLORIDE 4 MG/1
4 TABLET ORAL 3 TIMES DAILY PRN
Qty: 30 TABLET | Refills: 1 | Status: SHIPPED | OUTPATIENT
Start: 2018-04-19 | End: 2018-07-18

## 2018-04-25 ENCOUNTER — TELEPHONE (OUTPATIENT)
Dept: NEUROLOGY | Facility: CLINIC | Age: 48
End: 2018-04-25

## 2018-04-25 NOTE — TELEPHONE ENCOUNTER
I called patient and left a message that I am working with Evelyne in our pre-auth department to see if and what can be done with insurance to try and get authorization for the injections. I let her know I will be in touch once I have more information.

## 2018-04-25 NOTE — TELEPHONE ENCOUNTER
I called them back and told them it will be given by Dr. Grimm in clinic so do not worry about filling it. She has allergic reaction to Botox for Migraine but this may be an alternative.

## 2018-04-25 NOTE — TELEPHONE ENCOUNTER
Patient called stating Medicaid will not pay for the Botox and that she wants to cancel the appointment at this time. I spoke with Mary Jo Fowler CNP for recommendation on follow up appointment; she would like patient to schedule an appointment with Dr. Grimm. Provided this information to patient; she voiced understanding. Botox appointment has been cancelled and patient was transferred to front to schedule follow up with Dr. Grimm.

## 2018-04-26 NOTE — TELEPHONE ENCOUNTER
I spoke with Evelyne and this medication is not on the list of approved medications through [place] Medicaid. She provided them the information we have from the  so they are able to look more into the medication so they are able to take the steps and make determination as to whether Xeomin is something they will add to the list of approved medications in the future.     I called and spoke with Jt with [facility] and he gave me the phone number to [facility] to call and speak with more about the situation. Marcos will be sending me additional information that we are able to provide [place] Medicaid in hopes that this will become an approved medication. I inquired about whether financial assistance is offered and he stated they do offer patient assistance however, it is only for patients who do not have any form of health insurance. Although patient assistance will not be able to be utilized for this situation, he will be sending me the forms for that as well for future use.     I have visited with Mary Jo Fowler CNP to make sure it is fine for the patient to follow up with her in May as Dr. Grimm is booked out several months at this point. She states this is fine.    I called and visited with the patient and provided the information regarding the injections so she is aware that at this time of what the situation is. She voiced understanding of the information.

## 2018-04-26 NOTE — TELEPHONE ENCOUNTER
Hi Maria,  Yes, great to not give up on this and some help for Ivonne.  On my desk, under my computer screen is a business card for a madi named Jt.  He is the  for Xeomin.  Try calling him and explaining that this patient has failed all other meds.  I have spoken to him recently about Ivonne, so he is familiar with her case.  If anyone can offer reimbursements or find a way to make it affordable, it will be him.  Thanks so much for me and Ivonne both.  Solange

## 2018-05-21 ENCOUNTER — TELEPHONE (OUTPATIENT)
Dept: NEUROLOGY | Facility: CLINIC | Age: 48
End: 2018-05-21

## 2018-05-23 NOTE — TELEPHONE ENCOUNTER
Pt did  Not go to ED . Some improvement with headache she is not stuck in bed . She said the increase in medications makes her sleepy. Told pt sometimes it takes awhile to get use to dose increases . Pt told she could f/u with Mary Jo Fowler CNP if she wants to . But she needs to keep her appt with Dr Man.

## 2018-05-25 ENCOUNTER — TELEPHONE (OUTPATIENT)
Dept: NEUROLOGY | Facility: CLINIC | Age: 48
End: 2018-05-25

## 2018-05-25 DIAGNOSIS — R10.11 ABDOMINAL PAIN, CHRONIC, RIGHT UPPER QUADRANT: ICD-10-CM

## 2018-05-25 DIAGNOSIS — Z79.891 LONG-TERM CURRENT USE OF OPIATE ANALGESIC: ICD-10-CM

## 2018-05-25 DIAGNOSIS — G89.29 ABDOMINAL PAIN, CHRONIC, RIGHT UPPER QUADRANT: ICD-10-CM

## 2018-05-25 NOTE — TELEPHONE ENCOUNTER
Pt was in ED in [place] 4-5 hr yest  With migraine . They gave her tordal but migraine came back . They told her to call her neurologist . She did not do increase on medication . The pharmacy said they have to order . Told pt to call them again . Will let Mary Jo Lyons know. Pt asked if there was anything else that can be done ?

## 2018-05-26 ENCOUNTER — HOSPITAL ENCOUNTER (EMERGENCY)
Facility: HOSPITAL | Age: 48
Discharge: HOME | End: 2018-05-26
Attending: EMERGENCY MEDICINE
Payer: COMMERCIAL

## 2018-05-26 ENCOUNTER — APPOINTMENT (OUTPATIENT)
Dept: CT IMAGING | Facility: HOSPITAL | Age: 48
End: 2018-05-26
Payer: COMMERCIAL

## 2018-05-26 VITALS
TEMPERATURE: 100.2 F | WEIGHT: 228 LBS | DIASTOLIC BLOOD PRESSURE: 64 MMHG | OXYGEN SATURATION: 95 % | BODY MASS INDEX: 35.79 KG/M2 | RESPIRATION RATE: 18 BRPM | HEIGHT: 67 IN | HEART RATE: 99 BPM | SYSTOLIC BLOOD PRESSURE: 123 MMHG

## 2018-05-26 DIAGNOSIS — R50.9 FEBRILE ILLNESS: ICD-10-CM

## 2018-05-26 DIAGNOSIS — R11.2 NAUSEA AND VOMITING: ICD-10-CM

## 2018-05-26 DIAGNOSIS — R51.9 HEADACHE: ICD-10-CM

## 2018-05-26 DIAGNOSIS — B34.9 VIRAL SYNDROME: Primary | ICD-10-CM

## 2018-05-26 LAB
ALBUMIN SERPL-MCNC: 3.6 G/DL (ref 3.5–5.3)
ALP SERPL-CCNC: 189 U/L (ref 39–100)
ALT SERPL-CCNC: 82 U/L (ref 0–52)
ANION GAP SERPL CALC-SCNC: 13 MMOL/L (ref 3–11)
APPEARANCE FLD: NORMAL
APTT PPP: 28 SECONDS (ref 24–37)
AST SERPL-CCNC: 65 U/L (ref 0–39)
B PERT DNA SPEC QL NAA+PROBE: NEGATIVE
B PERT DNA SPEC QL NAA+PROBE: NEGATIVE
BACTERIA #/AREA URNS AUTO: ABNORMAL /HPF
BASOPHILS # BLD AUTO: 0.1 10*3/UL
BASOPHILS NFR BLD AUTO: 1 % (ref 0–2)
BILIRUB SERPL-MCNC: 0.66 MG/DL (ref 0–1.4)
BILIRUB UR QL STRIP.AUTO: NEGATIVE
BUN SERPL-MCNC: 9 MG/DL (ref 7–25)
C GATTII+NEOFOR DNA CSF QL NAA+NON-PROBE: NEGATIVE
C PNEUM DNA SPEC QL NAA+PROBE: NEGATIVE
CALCIUM ALBUM COR SERPL-MCNC: 8.7 MG/DL (ref 8.5–10.1)
CALCIUM SERPL-MCNC: 8.4 MG/DL (ref 8.6–10.3)
CHLORIDE SERPL-SCNC: 101 MMOL/L (ref 98–107)
CLARITY UR: CLEAR
CMV DNA # CSF NAA+PROBE: NEGATIVE {COPIES}/ML
CO2 SERPL-SCNC: 21 MMOL/L (ref 21–32)
COLOR FLD: NORMAL
COLOR UR: YELLOW
CREAT SERPL-MCNC: 0.7 MG/DL (ref 0.6–1.2)
E COLI K1 DNA CSF QL NAA+NON-PROBE: NEGATIVE
EOSINOPHIL # BLD AUTO: 0 10*3/UL
EOSINOPHIL NFR BLD AUTO: 0 % (ref 0–3)
ERYTHROCYTE [DISTWIDTH] IN BLOOD BY AUTOMATED COUNT: 13.6 % (ref 11.5–14)
ETHANOL SERPL-MCNC: <10 MG/DL (ref 0–10)
EV RNA # CSF NAA+PROBE: NEGATIVE {COPIES}/ML
FLUAV RNA SPEC NAA+PROBE-ACNC: NEGATIVE
FLUBV RNA SPEC QL NAA+PROBE: NEGATIVE
GFR SERPL CREATININE-BSD FRML MDRD: 89 ML/MIN/1.73M*2
GLUCOSE CSF-MCNC: 66 MG/DL (ref 40–70)
GLUCOSE SERPL-MCNC: 108 MG/DL (ref 70–105)
GLUCOSE UR STRIP.AUTO-MCNC: NEGATIVE MG/DL
GP B STREP DNA CSF QL NAA+NON-PROBE: NEGATIVE
HADV DNA SPEC QL NAA+PROBE: NEGATIVE
HAEM INFLU DNA CSF QL NAA+NON-PROBE: NEGATIVE
HAV IGM SERPL QL IA: NORMAL
HBV CORE IGM SERPL QL IA: NORMAL INDEX VALUE
HBV SURFACE AG SER QL: NORMAL
HCOV 229E RNA SPEC QL NAA+PROBE: NEGATIVE
HCOV HKU1 RNA SPEC QL NAA+PROBE: NEGATIVE
HCOV NL63 RNA SPEC QL NAA+PROBE: NEGATIVE
HCOV OC43 RNA SPEC QL NAA+PROBE: NEGATIVE
HCT VFR BLD AUTO: 39 % (ref 34–45)
HCV AB SER QL: NORMAL
HGB BLD-MCNC: 13.7 G/DL (ref 11.5–15.5)
HGB UR QL STRIP.AUTO: ABNORMAL
HHV6 DNA CSF QL NAA+NON-PROBE: NEGATIVE
HMPV RNA ISLT QL NAA+PROBE: NEGATIVE
HPIV1 RNA SPEC QL NAA+PROBE: NEGATIVE
HPIV2 RNA SPEC QL NAA+PROBE: NEGATIVE
HPIV3 RNA SPEC QL NAA+PROBE: NEGATIVE
HPIV4 RNA SPEC QL NAA+PROBE: NEGATIVE
HSV1 DNA # CSF NAA+PROBE: NEGATIVE {COPIES}/ML
HSV2 DNA CSF QL NAA+PROBE: NEGATIVE
INR PPP: 1.18 (ref 0.9–1.1)
KETONES UR STRIP.AUTO-MCNC: 20 MG/DL
L MONOCYTOG DNA CSF QL NAA+NON-PROBE: NEGATIVE
LACTATE BLDV-SCNC: 0.55 MMOL/L (ref 0.5–1.99)
LEUKOCYTE ESTERASE UR QL STRIP: NEGATIVE
LIPASE SERPL-CCNC: 23 U/L (ref 11–82)
LYMPHOCYTES # BLD AUTO: 1.6 10*3/UL
LYMPHOCYTES NFR BLD AUTO: 13 % (ref 11–47)
M PNEUMO DNA # SPEC NAA+PROBE: NEGATIVE {COPIES}/ML
MCH RBC QN AUTO: 29.2 PG (ref 28–33)
MCHC RBC AUTO-ENTMCNC: 35 G/DL (ref 32–36)
MCV RBC AUTO: 83.5 FL (ref 81–97)
MONOCYTES # BLD AUTO: 1 10*3/UL
MONOCYTES NFR BLD AUTO: 8 % (ref 3–11)
N MEN DNA CSF QL NAA+NON-PROBE: NEGATIVE
NEUTROPHILS # BLD AUTO: 9.5 10*3/UL
NEUTROPHILS NFR BLD AUTO: 78 % (ref 41–81)
NITRITE UR QL STRIP.AUTO: NEGATIVE
PARECHOVIRUS A RNA CSF QL NAA+NON-PROBE: NEGATIVE
PH UR STRIP.AUTO: 6 PH
PLATELET # BLD AUTO: 203 10*3/UL (ref 140–350)
PMV BLD AUTO: 6.6 FL (ref 6.9–10.8)
POTASSIUM SERPL-SCNC: 2.9 MMOL/L (ref 3.5–5.1)
PROT CSF-MCNC: 30 MG/DL (ref 15–45)
PROT SERPL-MCNC: 7.6 G/DL (ref 6–8.3)
PROT UR STRIP.AUTO-MCNC: 30 MG/DL
PROTHROMBIN TIME: 14.9 SECONDS (ref 12.3–14.8)
RBC # BLD AUTO: 4.67 10*6/ΜL (ref 3.7–5.3)
RBC # FLD: 717 CELLS/MM*3
RBC #/AREA URNS AUTO: ABNORMAL /HPF
RSV A RNA SPEC QL NAA+PROBE: NEGATIVE
RV+EV RNA SPEC QL NAA+PROBE: NEGATIVE
S PNEUM DNA CSF QL NAA+NON-PROBE: NEGATIVE
SODIUM SERPL-SCNC: 135 MMOL/L (ref 135–145)
SP GR UR STRIP.AUTO: 1.01 (ref 1–1.03)
SQUAMOUS #/AREA URNS AUTO: ABNORMAL /HPF
UROBILINOGEN UR STRIP.AUTO-MCNC: <2 E.U./DL
VZV DNA CSF QL NAA+NON-PROBE: NEGATIVE
WBC # BLD AUTO: 12.2 10*3/UL (ref 4.5–10.5)
WBC # FLD: 0 CELLS/MM*3
WBC #/AREA URNS AUTO: ABNORMAL /HPF

## 2018-05-26 PROCEDURE — 80074 ACUTE HEPATITIS PANEL: CPT | Performed by: NURSE PRACTITIONER

## 2018-05-26 PROCEDURE — 6370000100 HC RX 637 (ALT 250 FOR IP): Performed by: NURSE PRACTITIONER

## 2018-05-26 PROCEDURE — 87633 RESP VIRUS 12-25 TARGETS: CPT | Performed by: EMERGENCY MEDICINE

## 2018-05-26 PROCEDURE — 70450 CT HEAD/BRAIN W/O DYE: CPT

## 2018-05-26 PROCEDURE — 2500000200 HC RX 250 WO HCPCS: Performed by: NURSE PRACTITIONER

## 2018-05-26 PROCEDURE — 85025 COMPLETE CBC W/AUTO DIFF WBC: CPT | Performed by: EMERGENCY MEDICINE

## 2018-05-26 PROCEDURE — 99285 EMERGENCY DEPT VISIT HI MDM: CPT | Performed by: EMERGENCY MEDICINE

## 2018-05-26 PROCEDURE — 86788 WEST NILE VIRUS AB IGM: CPT | Performed by: EMERGENCY MEDICINE

## 2018-05-26 PROCEDURE — 80320 DRUG SCREEN QUANTALCOHOLS: CPT | Performed by: NURSE PRACTITIONER

## 2018-05-26 PROCEDURE — 6360000200 HC RX 636 W HCPCS (ALT 250 FOR IP): Performed by: EMERGENCY MEDICINE

## 2018-05-26 PROCEDURE — 81001 URINALYSIS AUTO W/SCOPE: CPT | Performed by: EMERGENCY MEDICINE

## 2018-05-26 PROCEDURE — 62270 DX LMBR SPI PNXR: CPT | Performed by: EMERGENCY MEDICINE

## 2018-05-26 PROCEDURE — 83690 ASSAY OF LIPASE: CPT | Performed by: NURSE PRACTITIONER

## 2018-05-26 PROCEDURE — 96375 TX/PRO/DX INJ NEW DRUG ADDON: CPT

## 2018-05-26 PROCEDURE — G0480 DRUG TEST DEF 1-7 CLASSES: HCPCS | Performed by: NURSE PRACTITIONER

## 2018-05-26 PROCEDURE — 84157 ASSAY OF PROTEIN OTHER: CPT | Performed by: EMERGENCY MEDICINE

## 2018-05-26 PROCEDURE — 89050 BODY FLUID CELL COUNT: CPT | Performed by: EMERGENCY MEDICINE

## 2018-05-26 PROCEDURE — 87070 CULTURE OTHR SPECIMN AEROBIC: CPT | Performed by: EMERGENCY MEDICINE

## 2018-05-26 PROCEDURE — 85730 THROMBOPLASTIN TIME PARTIAL: CPT | Performed by: EMERGENCY MEDICINE

## 2018-05-26 PROCEDURE — 87483 CNS DNA AMP PROBE TYPE 12-25: CPT | Performed by: EMERGENCY MEDICINE

## 2018-05-26 PROCEDURE — 85610 PROTHROMBIN TIME: CPT | Performed by: EMERGENCY MEDICINE

## 2018-05-26 PROCEDURE — 82945 GLUCOSE OTHER FLUID: CPT | Performed by: EMERGENCY MEDICINE

## 2018-05-26 PROCEDURE — 80053 COMPREHEN METABOLIC PANEL: CPT | Performed by: EMERGENCY MEDICINE

## 2018-05-26 PROCEDURE — 99285 EMERGENCY DEPT VISIT HI MDM: CPT

## 2018-05-26 PROCEDURE — 83605 ASSAY OF LACTIC ACID: CPT

## 2018-05-26 PROCEDURE — 96374 THER/PROPH/DIAG INJ IV PUSH: CPT

## 2018-05-26 PROCEDURE — 36415 COLL VENOUS BLD VENIPUNCTURE: CPT | Performed by: EMERGENCY MEDICINE

## 2018-05-26 RX ORDER — POTASSIUM CHLORIDE 750 MG/1
40 TABLET, EXTENDED RELEASE ORAL ONCE
Status: COMPLETED | OUTPATIENT
Start: 2018-05-26 | End: 2018-05-26

## 2018-05-26 RX ORDER — MORPHINE SULFATE 4 MG/ML
4 INJECTION, SOLUTION INTRAMUSCULAR; INTRAVENOUS ONCE
Status: COMPLETED | OUTPATIENT
Start: 2018-05-26 | End: 2018-05-26

## 2018-05-26 RX ORDER — ONDANSETRON 4 MG/1
4 TABLET, ORALLY DISINTEGRATING ORAL EVERY 8 HOURS PRN
Qty: 12 TABLET | Refills: 0 | Status: SHIPPED | OUTPATIENT
Start: 2018-05-26 | End: 2019-09-19

## 2018-05-26 RX ORDER — METOCLOPRAMIDE HYDROCHLORIDE 5 MG/ML
10 INJECTION INTRAMUSCULAR; INTRAVENOUS ONCE
Status: COMPLETED | OUTPATIENT
Start: 2018-05-26 | End: 2018-05-26

## 2018-05-26 RX ORDER — TRAMADOL HYDROCHLORIDE 50 MG/1
50 TABLET ORAL EVERY 6 HOURS PRN
Qty: 20 TABLET | Refills: 0 | Status: SHIPPED | OUTPATIENT
Start: 2018-05-26 | End: 2018-06-25 | Stop reason: SDUPTHER

## 2018-05-26 RX ORDER — DIPHENHYDRAMINE HYDROCHLORIDE 50 MG/ML
50 INJECTION INTRAMUSCULAR; INTRAVENOUS ONCE
Status: COMPLETED | OUTPATIENT
Start: 2018-05-26 | End: 2018-05-26

## 2018-05-26 RX ADMIN — DIPHENHYDRAMINE HYDROCHLORIDE 50 MG: 50 INJECTION INTRAMUSCULAR; INTRAVENOUS at 13:39

## 2018-05-26 RX ADMIN — METOCLOPRAMIDE 10 MG: 5 INJECTION, SOLUTION INTRAMUSCULAR; INTRAVENOUS at 13:39

## 2018-05-26 RX ADMIN — POTASSIUM CHLORIDE 40 MEQ: 750 TABLET, FILM COATED, EXTENDED RELEASE ORAL at 15:21

## 2018-05-26 RX ADMIN — MORPHINE SULFATE 4 MG: 4 INJECTION INTRAVENOUS at 16:37

## 2018-05-26 NOTE — ED ATTESTATION NOTE
I performed a history and physical examination of Ivonne Giron and discussed her   management with Advanced Practice Provider, Jaqueline Winslow CNP .  I agree with the history, physical, assessment, and plan of care.  On my face-to-face visit with patient, she is resting after medications, but still with headache.  She was consented for lumbar puncture given the fever persistent headache and neck pain.  Lumbar puncture was performed without complication.  She has been resting comfortably.  Meningitis panel is negative.  No clear etiology of her fever is identified.    Elevation of LFTs.  Hepatitis panel is negative.    However with negative CT, negative LP, I do feel she is still a good candidate for ongoing outpatient management of a likely viral febrile process.  Return precautions have been discussed at length and plan is for discharge home.    Results for orders placed or performed during the hospital encounter of 05/26/18   CBC w/auto differential Blood, Venous   Result Value Ref Range    WBC 12.2 (H) 4.5 - 10.5 10*3/uL    RBC 4.67 3.70 - 5.30 10*6/µL    Hemoglobin 13.7 11.5 - 15.5 g/dL    Hematocrit 39.0 34.0 - 45.0 %    MCV 83.5 81.0 - 97.0 fL    MCH 29.2 28.0 - 33.0 pg    MCHC 35.0 32.0 - 36.0 g/dL    RDW 13.6 11.5 - 14.0 %    Platelets 203 140 - 350 10*3/uL    MPV 6.6 (L) 6.9 - 10.8 fL    Neutrophils% 78 41 - 81 %    Lymphocytes% 13 11 - 47 %    Monocytes% 8 3 - 11 %    Eosinophils% 0 0 - 3 %    Basophils% 1 0 - 2 %    Neutrophils Absolute 9.50 10*3/uL    Lymphocytes Absolute 1.60 10*3/uL    Monocytes Absolute 1.00 10*3/uL    Eosinophils Absolute 0.00 10*3/uL    Basophils Absolute 0.10 10*3/uL   Protime-INR Blood, Venous   Result Value Ref Range    Protime 14.9 (H) 12.3 - 14.8 seconds    INR 1.18 (H) 0.90 - 1.10   PTT (activated partial thromboplastin time) Blood, Venous   Result Value Ref Range    PTT 28 24 - 37 SECONDS   Comprehensive metabolic panel Blood, Venous   Result Value Ref Range    Sodium  135 135 - 145 mmol/L    Potassium 2.9 (LL) 3.5 - 5.1 mmol/L    Chloride 101 98 - 107 mmol/L    CO2 21 21 - 32 mmol/L    Anion Gap 13 (H) 3 - 11 mmol/L    BUN 9 7 - 25 mg/dL    Creatinine 0.7 0.6 - 1.2 mg/dL    Glucose 108 (H) 70 - 105 mg/dL    Calcium 8.4 (L) 8.6 - 10.3 mg/dL    AST 65 (H) 0 - 39 U/L    ALT (SGPT) 82 (H) 0 - 52 U/L    Alkaline Phosphatase 189 (H) 39 - 100 U/L    Total Protein 7.6 6.0 - 8.3 g/dL    Albumin 3.6 3.5 - 5.3 g/dL    Total Bilirubin 0.66 0.00 - 1.40 mg/dL    eGFR 89 >60 mL/min/1.73m*2    Corrected Calcium 8.7 8.5 - 10.1 mg/dL   Glucose, CSF Cerebrospinal Fluid   Result Value Ref Range    Glucose, CSF 66 40 - 70 mg/dL   Protein, CSF Cerebrospinal Fluid   Result Value Ref Range    Protein, CSF 30 15 - 45 mg/dL   CSF cell count w/reflex diff Cerebrospinal Fluid   Result Value Ref Range    Color, CSF Other     Clarity, CSF Slightly Cloudy     WBC, CSF 0 cells/mm*3    RBC,  cells/mm*3   Meningitis/Encephalitis panel, PCR Cerebrospinal Fluid   Result Value Ref Range    E.Coli K1 Negative Negative    H. Influenzae Negative Negative    L. Monocytogenes Negative Negative    N. Meningitidis Negative Negative    S. Agalactiae Negative Negative    S. Pneumoniae Negative Negative    CMV Negative Negative    Enterovirus Negative Negative    HSV1 Negative Negative    HSV2 Negative Negative    HHV6 Negative Negative    Human Parechovirus Negative Negative    Varicella Zoster Negative Negative    C. Neoformans/Benjamin Negative Negative   Lipase Blood, Venous   Result Value Ref Range    Lipase 23 11 - 82 U/L   Hepatitis panel, acute Blood, Venous   Result Value Ref Range    Hepatitis B Surface Ag Non-reactive Non-reactive    Hep A IgM Non-reactive Non-reactive    Hep B Core IgM Non-reactive Non-reactive Index value    Hepatitis C Ab Non-reactive Non-reactive   Alcohol Blood, Venous   Result Value Ref Range    Ethanol Lvl <10 0 - 10 mg/dL   POCT lactic acid (lactate)   Result Value Ref Range    POC  Lactate 0.55 0.50 - 1.99 mmol/L               MD Radha CAMP MD  05/26/18 [id number]

## 2018-05-26 NOTE — DISCHARGE INSTRUCTIONS
Recommend rest and increase oral fluids.  Use pain medication as needed.  Use education as needed for nausea.  Follow-up with primary care provider next week.  Follow-up with pain management for further refills of pain medication.  Return or seek medical attention sooner for new or worsening symptoms.

## 2018-05-26 NOTE — ED PROCEDURE NOTE
Procedure  Lumbar Puncture  Date/Time: 5/26/2018 2:59 PM  Performed by: RADHA TELLES  Authorized by: RADHA TELLES     Consent:     Consent obtained:  Written    Consent given by:  Patient    Risks discussed:  Bleeding, infection, nerve damage, pain and repeat procedure    Alternatives discussed:  No treatment  Pre-procedure details:     Procedure purpose:  Diagnostic  Anesthesia (see MAR for exact dosages):     Anesthesia method:  Local infiltration    Local anesthetic:  Lidocaine 1% w/o epi  Procedure details:     Lumbar space:  L4-L5 interspace    Patient position:  Sitting    Needle gauge:  22    Needle type:  Spinal needle - Quincke tip    Needle length (in):  3.5    Ultrasound guidance: no      Number of attempts:  1    Fluid appearance:  Clear    Tubes of fluid:  4    Total volume (ml):  4  Post-procedure:     Puncture site:  Adhesive bandage applied    Patient tolerance of procedure:  Tolerated well, no immediate complications                 Radha Telles MD  05/26/18 [id number]

## 2018-05-26 NOTE — ED PROVIDER NOTES
HPI:  Chief Complaint   Patient presents with   • Headache     PT  HAS HAD A MIGRAINE HEADACHE SINCE LAST SUNDAY. WAS SEEN IN [place] ON WEDNESDAY, RECVD TORADOL SHOT WITHOUT RELIEF. PT  HAS CHILLS, NAUSEA AND SLIGHT SHORTNESS OF BREATH   • Nausea   • Shortness of Breath     HPI  Patient presents with reports of headache for the past week. Patient  has a history of migraine headaches in the past.  went to the hospital in [place] on Wednesday and was given IM toradol with no improvement of symptoms. States Wednesday night developed fever, nausea, vomiting, shortness of breath, and neck pain.  Patient  she was seen and her primary care provider's office on Thursday, and had a chest x-ray and a CT scan of her chest, which were both normal.  Patient  continues to have fevers, neck pain, nausea and vomiting, and headache.  Reports has vomited 3 times today.  Denies abdominal pain.  Denies visual disturbances.   has had numbness to left hand and this morning which has resolved.  Denies loss of bowel or bladder control.  has been taking tramadol, which she takes for chronic pain, with no improvement of symptoms.     HISTORY:  Past Medical History:   Diagnosis Date   • Abdominal pain, chronic, generalized    • Accelerated essential hypertension    • Chronic pain disorder    • Intercostal neuropathic pain    • Migraine    • Pain     surgical sight pain since gallbladder surgery       Past Surgical History:   Procedure Laterality Date   • CHOLECYSTECTOMY         Family History   Problem Relation Age of Onset   • Arthritis Mother    • Hypertension Father    • Hypertension Other    • Tuberculosis Other    • Arthritis Other    • Diabetes Other    • Heart disease Other    • Migraines Other    • Migraines Mother's Sister        Social History   Substance Use Topics   • Smoking status: Never Smoker   • Smokeless tobacco: Never Used   • Alcohol use No       ROS:  Constitutional: positive  for fever  HEENT: Negative for sore throat, negative for runny nose.  Respiratory: negative for cough, negative for shortness of breath.  Cardiovascular: Negative for chest pain.  GI: Positive for nausea and vomiting.  Negative for abdominal pain.  : Negative for dysuria.  Musculoskeletal: Negative for weakness.  Negative for swelling.  Positive for neck pain.  Neurological: Negative for visual disturbances.  Positive for numbness to left hand, resolved.  Positive for headache.  Integumentary: Negative for rash.  Immunology: Negative for joint swelling.    PHYSICAL EXAM:  ED Triage Vitals   Temp Heart Rate Resp BP SpO2   05/26/18 1234 05/26/18 1234 05/26/18 1234 05/26/18 1234 05/26/18 1234   (!) 39.4 °C (102.9 °F) 105 18 143/93 93 %      Temp Source Heart Rate Source Patient Position BP Location FiO2 (%)   05/26/18 1234 -- 05/26/18 1438 -- --   Oral  Right lateral       Nursing note and vitals reviewed.  Constitutional: appears obese.  Well-hydrated.  Head: Normocephalic and atraumatic.   Eyes: Conjunctiva normal.  PERRLA, EOMI.  Ears: Bilateral EACs and TMs clear.  Nose: No nasal drainage.  Mouth: Mucous membranes moist.  Oropharynx without erythema, no tonsillar enlargement or exudates, uvula midline.  Neck: Supple.  No cervical lymphadenopathy.  Neck tenderness to palpation.  Cardiovascular: Regular rate and rhythm, no murmur.  Pulmonary/Chest: No respiratory distress.  Clear to auscultation bilaterally.  Abdominal: Soft and nontender.  Normal bowel sounds.  Back: Non-tender.  Musculoskeletal: No edema  Neurological: Alert. No focal deficits.  Skin: Skin is warm and dry. No rash noted.   Psychiatric: Normal mood and affect.    MDM: Patient presents with reports of headache for 1 week, with fever, neck pain, nausea and vomiting with headache for the past 4 days.  States uncertain how high fevers have been at home, does not have a thermometer.  Denies visual disturbances.  States had numbness to left hand this  morning, resolved.  Denies weakness to extremities.  Reports nausea with 3 episodes of vomiting today.  Denies abdominal pain.  Discussed patient case with Dr. Telles, attending ED physician, recommends labs, CT head, and lumbar puncture.  Reviewed lab results shows slightly elevated WBC, and potassium 2.9.  Patient given oral potassium.  Patient given IV Benadryl and Reglan.  Reviewed respiratory pathogen panel normal.  Reviewed CT head negative for acute abnormality.  Patient underwent LP per Dr. Telles.  Reviewed CSF cell count negative for WBCs, meningitis/encephalitis panel negative.  Patient given IV morphine for pain.  Reviewed patient case with Dr. Telles, attending ED physician examined patient as well, recommends discharge home.  Patient states is feeling better and agreeable to plan to discharge.  Discussed febrile illness, viral syndrome, nausea and vomiting, and headache.  Recommend patient rest and increase oral fluids.  States is out of tramadol, reports gets refills monthly on the 26th from pain management.  [place], patient has not filled tramadol since 4/26/2018.  Patient prescribed a few tablets of tramadol and recommend follow-up with pain management for further refills of pain medication.  Recommend patient follow-up with primary care provider next week, and encouraged patient to return or seek medical attention sooner for new or worsening symptoms, patient agreeable to plan of care.    Medications   metoclopramide (REGLAN) injection 10 mg (10 mg intravenous Given 5/26/18 1339)   diphenhydrAMINE (BENADRYL) injection 50 mg (50 mg intravenous Given 5/26/18 1339)   potassium chloride (KLOR-CON) CR tablet 40 mEq (40 mEq oral Given 5/26/18 1521)   morphine injection 4 mg (4 mg intravenous Given 5/26/18 1637)       CT head without IV contrast   Final Result   1.   Negative. Nothing acute.             ED Medication Administration from 05/26/2018 1227 to 05/26/2018 1747       Date/Time Order Dose  Route Action Action by     05/26/2018 1339 metoclopramide (REGLAN) injection 10 mg 10 mg intravenous Given Dell, DARRYL     05/26/2018 1339 diphenhydrAMINE (BENADRYL) injection 50 mg 50 mg intravenous Given Daljitosmikaela, DARRYL     05/26/2018 1521 potassium chloride (KLOR-CON) CR tablet 40 mEq 40 mEq oral Given Darwin, SARAH     05/26/2018 1637 morphine injection 4 mg 4 mg intravenous Given JULIUS Olivarez          PROCEDURES:  Procedures    ED COURSE:  ED Course as of May 26 1907   Sat May 26, 2018   1455 Reviewed CT head negative for acute abnormality.   [BB]      ED Course User Index  [BB] Jaqueline Winslow CNP       CLINICAL IMPRESSION:  Final diagnoses:   [B34.9] Viral syndrome   [R51] Headache   [R11.2] Nausea and vomiting   [R50.9] Febrile illness        Jaqueline Winslow CNP  05/26/18 1913

## 2018-05-27 LAB — WNV IGG CSF QL IA: NEGATIVE

## 2018-05-29 LAB
BACTERIA CSF CULT: NORMAL
GRAM STN SPEC: NORMAL
GRAM STN SPEC: NORMAL

## 2018-05-29 RX ORDER — TRAMADOL HYDROCHLORIDE 50 MG/1
TABLET ORAL
Qty: 240 TABLET | Refills: 0 | OUTPATIENT
Start: 2018-05-29

## 2018-06-19 DIAGNOSIS — R10.10 PAIN OF UPPER ABDOMEN: ICD-10-CM

## 2018-06-19 DIAGNOSIS — R10.11 ABDOMINAL PAIN, CHRONIC, RIGHT UPPER QUADRANT: ICD-10-CM

## 2018-06-19 DIAGNOSIS — G89.29 ABDOMINAL PAIN, CHRONIC, RIGHT UPPER QUADRANT: ICD-10-CM

## 2018-06-19 DIAGNOSIS — Z79.891 LONG-TERM CURRENT USE OF OPIATE ANALGESIC: ICD-10-CM

## 2018-06-19 RX ORDER — GABAPENTIN 300 MG/1
CAPSULE ORAL
Qty: 90 CAPSULE | Refills: 3 | OUTPATIENT
Start: 2018-06-19

## 2018-06-19 RX ORDER — TRAMADOL HYDROCHLORIDE 50 MG/1
TABLET ORAL
Qty: 240 TABLET | Refills: 0 | OUTPATIENT
Start: 2018-06-19

## 2018-06-19 RX ORDER — GABAPENTIN 300 MG/1
300 CAPSULE ORAL
Qty: 90 CAPSULE | Refills: 0 | Status: SHIPPED | OUTPATIENT
Start: 2018-06-19 | End: 2018-08-03 | Stop reason: SDUPTHER

## 2018-06-25 DIAGNOSIS — Z79.891 LONG TERM CURRENT USE OF OPIATE ANALGESIC: Primary | ICD-10-CM

## 2018-06-25 RX ORDER — TRAMADOL HYDROCHLORIDE 50 MG/1
50-100 TABLET ORAL EVERY 6 HOURS PRN
Qty: 240 TABLET | Refills: 0 | Status: SHIPPED | OUTPATIENT
Start: 2018-06-26 | End: 2018-07-26 | Stop reason: WASHOUT

## 2018-07-03 ENCOUNTER — OFFICE VISIT (OUTPATIENT)
Dept: PAIN MEDICINE | Facility: CLINIC | Age: 48
End: 2018-07-03
Attending: ANESTHESIOLOGY
Payer: COMMERCIAL

## 2018-07-03 VITALS
RESPIRATION RATE: 18 BRPM | HEART RATE: 76 BPM | OXYGEN SATURATION: 93 % | DIASTOLIC BLOOD PRESSURE: 89 MMHG | SYSTOLIC BLOOD PRESSURE: 129 MMHG

## 2018-07-03 DIAGNOSIS — R10.11 ABDOMINAL PAIN, CHRONIC, RIGHT UPPER QUADRANT: Primary | ICD-10-CM

## 2018-07-03 DIAGNOSIS — Z79.891 LONG-TERM CURRENT USE OF OPIATE ANALGESIC: ICD-10-CM

## 2018-07-03 DIAGNOSIS — G89.29 ABDOMINAL PAIN, CHRONIC, RIGHT UPPER QUADRANT: Primary | ICD-10-CM

## 2018-07-03 PROCEDURE — 99213 OFFICE O/P EST LOW 20 MIN: CPT

## 2018-07-03 RX ORDER — TRAMADOL HYDROCHLORIDE 50 MG/1
50-100 TABLET ORAL EVERY 4 HOURS PRN
Qty: 240 TABLET | Refills: 0 | Status: SHIPPED | OUTPATIENT
Start: 2018-08-25 | End: 2018-07-26 | Stop reason: SDUPTHER

## 2018-07-03 RX ORDER — TRAMADOL HYDROCHLORIDE 50 MG/1
50-100 TABLET ORAL EVERY 4 HOURS PRN
Qty: 240 TABLET | Refills: 0 | Status: SHIPPED | OUTPATIENT
Start: 2018-09-24 | End: 2018-07-26 | Stop reason: SDUPTHER

## 2018-07-03 RX ORDER — TRAMADOL HYDROCHLORIDE 50 MG/1
50-100 TABLET ORAL EVERY 4 HOURS PRN
Qty: 240 TABLET | Refills: 0 | Status: SHIPPED | OUTPATIENT
Start: 2018-07-26 | End: 2018-08-25 | Stop reason: WASHOUT

## 2018-07-03 RX ORDER — BACLOFEN 10 MG/1
10 TABLET ORAL 3 TIMES DAILY PRN
Qty: 90 TABLET | Refills: 0 | Status: SHIPPED | OUTPATIENT
Start: 2018-07-03 | End: 2020-03-06 | Stop reason: WASHOUT

## 2018-07-03 ASSESSMENT — ENCOUNTER SYMPTOMS
DIARRHEA: 0
ABDOMINAL PAIN: 1
HEADACHES: 1
CONSTIPATION: 0

## 2018-07-03 ASSESSMENT — PAIN - FUNCTIONAL ASSESSMENT: PAIN_FUNCTIONAL_ASSESSMENT: 0-10

## 2018-07-03 ASSESSMENT — PAIN DESCRIPTION - DESCRIPTORS: DESCRIPTORS: SHARP

## 2018-07-03 NOTE — PROGRESS NOTES
Subjective     Patient ID: Ivonne Giron is a 47 y.o. female.    HPI  Here for med review.  Today she is having an episode of her abd pain and she has some diaphoresis and some nausea but didn't get sick today.    She has had ICNB before and no relief in the past along with TPI and abd  injection.  She also cannot go to [place] to get this looked at.    Pain Score: 9  Pain Type: Chronic pain (worse 10, best 6)  Pain Location: Abdomen  Pain Radiating Towards:  (radiates up to the rib cage and around the  abdomen)  Pain Descriptors: Sharp  Pain Frequency: Constant/continuous  Pain Onset: Awakened from sleep  Clinical Progression: Gradually worsening  Pain Interventions: Medication (See MAR), Rest, Heat applied      Review of Systems   Gastrointestinal: Positive for abdominal pain. Negative for constipation and diarrhea.   Neurological: Positive for headaches.       Objective   /89 (BP Location: Right arm, Patient Position: Supine)   Pulse 76   Resp 18   SpO2 93%       Current Outpatient Prescriptions:   •  butalbital-acetaminophen-caff (FIORICET, ESGIC) -40 mg, Take 1 tablet by mouth every 4 (four) hours as needed.  , Disp: , Rfl:   •  calcium carbonate-vitamin D3 (CALCIUM 500 WITH D) 500 mg(1,250mg) -400 unit tablet, Take 1 tablet by mouth daily.  , Disp: , Rfl:   •  cyproheptadine (PERIACTIN) 4 mg tablet, Take 1 tablet (4 mg total) by mouth 3 (three) times a day as needed for allergies. Take one tab in the morning and one at night for a week. If tolerating my use three times a day., Disp: 30 tablet, Rfl: 1  •  FLUoxetine (PROzac) 20 mg capsule, Take 20 mg by mouth daily., Disp: , Rfl:   •  gabapentin (NEURONTIN) 300 mg capsule, Take 1 capsule (300 mg total) by mouth 3 (three) times a day., Disp: 90 capsule, Rfl: 0  •  hydrochlorothiazide (HYDRODIURIL) 25 mg tablet, Take 25 mg by mouth daily.  , Disp: , Rfl:   •  ibuprofen (MOTRIN IB) 200 mg tablet, Take 200 mg by mouth as needed.  , Disp: , Rfl:   •   losartan (COZAAR) 100 mg tablet, Take by mouth daily.  , Disp: , Rfl:   •  ondansetron ODT (ZOFRAN-ODT) 4 mg disintegrating tablet, Take 1 tablet (4 mg total) by mouth every 8 (eight) hours as needed for nausea or vomiting., Disp: 12 tablet, Rfl: 0  •  prenatal 25-iron fum-folic-dha (PRENATAL-1) -200 mg-mcg-mg capsule, Take 1 capsule by mouth daily., Disp: , Rfl:   •  traMADol (ULTRAM 50) 50 mg tablet, Take 1-2 tablets ( mg total) by mouth every 6 (six) hours as needed for pain scale 8-10/10., Disp: 240 tablet, Rfl: 0    Imaging:  No results found.      Physical Exam   Constitutional: She is oriented to person, place, and time. She appears well-developed and well-nourished. No distress.   HENT:   Head: Normocephalic.   Eyes: Conjunctivae are normal.   Neck: Neck supple.   Cardiovascular: Normal rate.    Pulmonary/Chest: Effort normal. No respiratory distress.   Abdominal: Soft. She exhibits distension (slight distension). There is tenderness. There is guarding.   Musculoskeletal: Normal range of motion. She exhibits no edema, tenderness or deformity.        Neurological: She is alert and oriented to person, place, and time.   Skin: Skin is warm and dry. No rash noted.   Psychiatric: She has a normal mood and affect. Her behavior is normal. Judgment and thought content normal.   Nursing note and vitals reviewed.      Assessment/Plan     1. Abdominal pain, chronic, right upper quadrant    2. Long-term current use of opiate analgesic      PDMP  reviewed.  MME =40.  Narcan done. Aberrant behavior = no. Affect/Mood = about the same. ADL = independent. Analgesia = adequate. Adverse Side Effects = no. Goals have been addressed and discussed with this patient.  The patient was advised that if at any time they feel oversedated to stop the medication and seek emergency care.    Discussed her prior treatments and she today is having an episode and she does appear ill and needed to lay down during our visit.   Discussed this happens daily and she may need to go to [place] to evaluate this and we discussed if she can even have injections due to derm saying she is allergic to prednisone.   Tizanidine with hives and methocarbomol given, try baclofen and she will take with tramadol and see if it lessens the pain when it comes.  She is able to recover from this episode by the end of the visit but was still in some turmoil but able to walk out of the exam.    DISCUSSION  Today's plan was a combined effort between the patient and myself. The patient understood the plan, verbally consented, and agreed to participate. An After Visit Summary with special instructions/information regarding today's office visit was given to the patient (see patient instructions).     The diagnostic assessment has been reviewed. Patient and/or patient's surrogate has expressed a good understanding of the assessment and recommendations from today's visit. There are no apparent barriers to understanding this information. Patient’s questions were addressed.  I have reviewed the patients current medications using all immediate resources available.     Patient has been advised to contact this office or the answering service with questions or concerns that may arise. Patient has been advised to follow up with Primary Care Provider for general medical needs.     A total of 15 minutes was required for this visit. Greater than 50% of this time was spent in direct face to face communication with the patient and/or surrogate in order to provide counseling regarding options for her pain and looking at old records.     GOKUL ASHTON, CNP

## 2018-07-11 DIAGNOSIS — G43.701 CHRONIC MIGRAINE WITHOUT AURA WITH STATUS MIGRAINOSUS, NOT INTRACTABLE: Primary | ICD-10-CM

## 2018-07-12 NOTE — TELEPHONE ENCOUNTER
Memory test -and Memory clinic -somebody will call your sister to schedule it.  Today a memory test -mild memory problems.  Needs help with finances!!!  Aricept 5 mg tablet to take in the evening.   See us back in 2 weeks.  Alzheimer [employer] online   Assisted living is a good idea.    Patient Education   Donepezil Hydrochloride Oral tablet  What is this medicine?  DONEPEZIL (patel NEP e zil) is used to treat mild to moderate dementia caused by Alzheimer's disease.  This medicine may be used for other purposes; ask your health care provider or pharmacist if you have questions.  What should I tell my health care provider before I take this medicine?  They need to know if you have any of these conditions:  · asthma or other lung disease  · difficulty passing urine  · head injury  · heart disease  · history of irregular heartbeat  · liver disease  · seizures (convulsions)  · stomach or intestinal disease, ulcers or stomach bleeding  · an unusual or allergic reaction to donepezil, other medicines, foods, dyes, or preservatives  · pregnant or trying to get pregnant  · breast-feeding  How should I use this medicine?  Take this medicine by mouth with a glass of water. Follow the directions on the prescription label. You may take this medicine with or without food. Take this medicine at regular intervals. This medicine is usually taken before bedtime. Do not take it more often than directed. Continue to take your medicine even if you feel better. Do not stop taking except on your doctor's advice.  If you are taking the 23 mg donepezil tablet, swallow it whole; do not cut, crush, or chew it.  Talk to your pediatrician regarding the use of this medicine in children. Special care may be needed.  Overdosage: If you think you have taken too much of this medicine contact a poison control center or emergency room at once.  NOTE: This medicine is only for you. Do not share this medicine with others.  What if I miss a dose?  If you  Provider called pt to discuss options   miss a dose, take it as soon as you can. If it is almost time for your next dose, take only that dose, do not take double or extra doses.  What may interact with this medicine?  Do not take this medicine with any of the following medications:  · certain medicines for fungal infections like itraconazole, fluconazole, posaconazole, and voriconazole  · cisapride  · dextromethorphan; quinidine  · dofetilide  · dronedarone  · pimozide  · quinidine  · thioridazine  · ziprasidone  This medicine may also interact with the following medications:  · antihistamines for allergy, cough and cold  · atropine  · bethanechol  · carbamazepine  · certain medicines for bladder problems like oxybutynin, tolterodine  · certain medicines for Parkinson's disease like benztropine, trihexyphenidyl  · certain medicines for stomach problems like dicyclomine, hyoscyamine  · certain medicines for travel sickness like scopolamine  · dexamethasone  · ipratropium  · NSAIDs, medicines for pain and inflammation, like ibuprofen or naproxen  · other medicines for Alzheimer's disease  · other medicines that prolong the QT interval (cause an abnormal heart rhythm)  · phenobarbital  · phenytoin  · rifampin, rifabutin or rifapentine  This list may not describe all possible interactions. Give your health care provider a list of all the medicines, herbs, non-prescription drugs, or dietary supplements you use. Also tell them if you smoke, drink alcohol, or use illegal drugs. Some items may interact with your medicine.  What should I watch for while using this medicine?  Visit your doctor or health care professional for regular checks on your progress. Check with your doctor or health care professional if your symptoms do not get better or if they get worse.  You may get drowsy or dizzy. Do not drive, use machinery, or do anything that needs mental alertness until you know how this drug affects you.  What side effects may I notice from receiving this  medicine?  Side effects that you should report to your doctor or health care professional as soon as possible:  · allergic reactions like skin rash, itching or hives, swelling of the face, lips, or tongue  · changes in vision  · feeling faint or lightheaded, falls  · problems with balance  · redness, blistering, peeling or loosening of the skin, including inside the mouth  · slow heartbeat, or palpitations  · stomach pain  · unusual bleeding or bruising, red or purple spots on the skin  · vomiting  · weight loss  Side effects that usually do not require medical attention (report to your doctor or health care professional if they continue or are bothersome):  · diarrhea, especially when starting treatment  · headache  · indigestion or heartburn  · loss of appetite  · muscle cramps  · nausea  This list may not describe all possible side effects. Call your doctor for medical advice about side effects. You may report side effects to FDA at 2-064-FDA-1489.  Where should I keep my medicine?  Keep out of reach of children.  Store at room temperature between 15 and 30 degrees C (59 and 86 degrees F). Throw away any unused medicine after the expiration date.  NOTE:This sheet is a summary. It may not cover all possible information. If you have questions about this medicine, talk to your doctor, pharmacist, or health care provider. Copyright© 2016 [employer]

## 2018-07-26 DIAGNOSIS — G89.29 ABDOMINAL PAIN, CHRONIC, RIGHT UPPER QUADRANT: ICD-10-CM

## 2018-07-26 DIAGNOSIS — Z79.891 LONG-TERM CURRENT USE OF OPIATE ANALGESIC: ICD-10-CM

## 2018-07-26 DIAGNOSIS — R10.11 ABDOMINAL PAIN, CHRONIC, RIGHT UPPER QUADRANT: ICD-10-CM

## 2018-07-26 RX ORDER — TRAMADOL HYDROCHLORIDE 50 MG/1
50-100 TABLET ORAL EVERY 6 HOURS PRN
Qty: 240 TABLET | Refills: 0 | Status: SHIPPED | OUTPATIENT
Start: 2018-07-26 | End: 2018-08-25 | Stop reason: WASHOUT

## 2018-07-26 NOTE — TELEPHONE ENCOUNTER
Nazanin, PhD at [facility], asks that you change directions to read 1-2 every six hours instead of every four hours. This is because at every four hours max daily is #12 and her insurance will only allow  #8 daily. This does not change the amount of 240. Nazanin took a verbal on this months but did void those for Aug and Sept. I will enter these for you and send them to you.

## 2018-08-03 DIAGNOSIS — R10.10 PAIN OF UPPER ABDOMEN: ICD-10-CM

## 2018-08-06 RX ORDER — GABAPENTIN 300 MG/1
CAPSULE ORAL
Qty: 90 CAPSULE | Refills: 0 | Status: SHIPPED | OUTPATIENT
Start: 2018-08-06 | End: 2018-09-09 | Stop reason: SDUPTHER

## 2018-09-09 DIAGNOSIS — R10.10 PAIN OF UPPER ABDOMEN: ICD-10-CM

## 2018-09-10 RX ORDER — GABAPENTIN 300 MG/1
CAPSULE ORAL
Qty: 90 CAPSULE | Refills: 0 | Status: SHIPPED | OUTPATIENT
Start: 2018-09-10 | End: 2018-10-23 | Stop reason: SDUPTHER

## 2018-09-25 ENCOUNTER — OFFICE VISIT (OUTPATIENT)
Dept: PAIN MEDICINE | Facility: CLINIC | Age: 48
End: 2018-09-25
Attending: ANESTHESIOLOGY
Payer: COMMERCIAL

## 2018-09-25 VITALS
DIASTOLIC BLOOD PRESSURE: 99 MMHG | HEART RATE: 74 BPM | SYSTOLIC BLOOD PRESSURE: 146 MMHG | OXYGEN SATURATION: 97 % | RESPIRATION RATE: 16 BRPM

## 2018-09-25 DIAGNOSIS — R10.11 ABDOMINAL PAIN, CHRONIC, RIGHT UPPER QUADRANT: Primary | ICD-10-CM

## 2018-09-25 DIAGNOSIS — Z79.891 LONG-TERM CURRENT USE OF OPIATE ANALGESIC: ICD-10-CM

## 2018-09-25 DIAGNOSIS — G89.29 ABDOMINAL PAIN, CHRONIC, RIGHT UPPER QUADRANT: Primary | ICD-10-CM

## 2018-09-25 PROCEDURE — 99001 SPECIMEN HANDLING PT-LAB: CPT

## 2018-09-25 PROCEDURE — 99214 OFFICE O/P EST MOD 30 MIN: CPT

## 2018-09-25 RX ORDER — TRAMADOL HYDROCHLORIDE 50 MG/1
50-100 TABLET ORAL EVERY 4 HOURS PRN
Qty: 240 TABLET | Refills: 0 | Status: SHIPPED | OUTPATIENT
Start: 2018-12-24 | End: 2019-01-08 | Stop reason: SDUPTHER

## 2018-09-25 RX ORDER — TRAMADOL HYDROCHLORIDE 50 MG/1
50 TABLET ORAL EVERY 6 HOURS PRN
COMMUNITY
End: 2019-02-22 | Stop reason: WASHOUT

## 2018-09-25 RX ORDER — TRAMADOL HYDROCHLORIDE 50 MG/1
50-100 TABLET ORAL EVERY 4 HOURS PRN
Qty: 240 TABLET | Refills: 0 | Status: SHIPPED | OUTPATIENT
Start: 2018-11-24 | End: 2018-12-24 | Stop reason: WASHOUT

## 2018-09-25 RX ORDER — TRAMADOL HYDROCHLORIDE 50 MG/1
50-100 TABLET ORAL EVERY 4 HOURS PRN
Qty: 240 TABLET | Refills: 0 | Status: SHIPPED | OUTPATIENT
Start: 2018-10-25 | End: 2018-11-24 | Stop reason: WASHOUT

## 2018-09-25 ASSESSMENT — ENCOUNTER SYMPTOMS
BACK PAIN: 0
ARTHRALGIAS: 0
HEADACHES: 1
ABDOMINAL PAIN: 1
BRUISES/BLEEDS EASILY: 0
MYALGIAS: 0

## 2018-09-25 NOTE — PROGRESS NOTES
Subjective     Patient ID: Ivonne Giron is a 47 y.o. female.    HPI  Here for med review.  She is still having her same abd pain issues and no changes .    Pain Score: 5 - Moderate pain  Pain Type: Chronic pain  Pain Location: Abdomen  Pain Radiating Towards: around rib cage  Pain Descriptors: Sharp  Pain Frequency: Intermittent  Pain Onset: Awakened from sleep  Clinical Progression: Not changed  Pain Interventions: Medication (See MAR), Home medication, Heat applied, Rest, Repositioned      Review of Systems   HENT: Positive for dental problem (got dentures).    Gastrointestinal: Positive for abdominal pain.   Musculoskeletal: Negative for arthralgias, back pain and myalgias.   Neurological: Positive for headaches.   Hematological: Does not bruise/bleed easily.       Objective   /99 (BP Location: Left arm, Patient Position: Sitting)   Pulse 74   Resp 16   SpO2 97%       Current Outpatient Prescriptions:   •  butalbital-acetaminophen-caff (FIORICET, ESGIC) -40 mg, Take 1 tablet by mouth every 4 (four) hours as needed.  , Disp: , Rfl:   •  calcium carbonate-vitamin D3 (CALCIUM 500 WITH D) 500 mg(1,250mg) -400 unit tablet, Take 1 tablet by mouth daily.  , Disp: , Rfl:   •  FLUoxetine (PROzac) 20 mg capsule, Take 20 mg by mouth daily., Disp: , Rfl:   •  gabapentin (NEURONTIN) 300 mg capsule, TAKE 1 CAPSULE BY MOUTH THREE TIMES DAILY, Disp: 90 capsule, Rfl: 0  •  hydrochlorothiazide (HYDRODIURIL) 25 mg tablet, Take 25 mg by mouth daily.  , Disp: , Rfl:   •  ibuprofen (MOTRIN IB) 200 mg tablet, Take 200 mg by mouth as needed.  , Disp: , Rfl:   •  losartan (COZAAR) 100 mg tablet, Take by mouth daily.  , Disp: , Rfl:   •  prenatal 25-iron fum-folic-dha (PRENATAL-1) -200 mg-mcg-mg capsule, Take 1 capsule by mouth daily., Disp: , Rfl:   •  traMADol (ULTRAM 50) 50 mg tablet, Take 50 mg by mouth every 6 (six) hours as needed for pain scale 8-10/10., Disp: , Rfl:   •  baclofen (LIORESAL) 10 mg tablet,  Take 1 tablet (10 mg total) by mouth 3 (three) times a day as needed for muscle spasms., Disp: 90 tablet, Rfl: 0  •  ondansetron ODT (ZOFRAN-ODT) 4 mg disintegrating tablet, Take 1 tablet (4 mg total) by mouth every 8 (eight) hours as needed for nausea or vomiting. (Patient not taking: Reported on 9/25/2018 ), Disp: 12 tablet, Rfl: 0    Imaging:  No results found.      Physical Exam   Constitutional: She is oriented to person, place, and time. She appears well-developed and well-nourished. No distress.   HENT:   Head: Normocephalic.   Eyes: Conjunctivae are normal.   Neck: Neck supple.   Cardiovascular: Normal rate.    Pulmonary/Chest: Effort normal.   Abdominal: Soft. She exhibits no distension.   Musculoskeletal: Normal range of motion. She exhibits no edema, tenderness or deformity.        Neurological: She is alert and oriented to person, place, and time.   Skin: Skin is warm and dry. No rash noted.   Psychiatric: She has a normal mood and affect. Her behavior is normal. Judgment and thought content normal.   Nursing note and vitals reviewed.      Assessment/Plan     1. Abdominal pain, chronic, right upper quadrant    2. Long-term current use of opiate analgesic      PDMP  reviewed.  MME =40.  Narcan done. Aberrant behavior = no. Affect/Mood = about the same. ADL = independent. Analgesia = adequate. Adverse Side Effects = no. Goals have been addressed and discussed with this patient.  The patient was advised that if at any time they feel oversedated to stop the medication and seek emergency care.    No change in her meds and she will call if any problems or issues with her meds.     DISCUSSION  Today's plan was a combined effort between the patient and myself. The patient understood the plan, verbally consented, and agreed to participate. An After Visit Summary with special instructions/information regarding today's office visit was given to the patient (see patient instructions).     The diagnostic assessment  has been reviewed. Patient and/or patient's surrogate has expressed a good understanding of the assessment and recommendations from today's visit. There are no apparent barriers to understanding this information. Patient’s questions were addressed.  I have reviewed the patients current medications using all immediate resources available.     Patient has been advised to contact this office or the answering service with questions or concerns that may arise. Patient has been advised to follow up with Primary Care Provider for general medical needs.     A total of 15 minutes was required for this visit. Greater than 50% of this time was spent in direct face to face communication with the patient and/or surrogate in order to provide counseling regarding meds and her abd pain.    GOKUL ASHTON, CNP

## 2018-09-25 NOTE — PATIENT INSTRUCTIONS
your prescription today and then 10/25/18 then 11/24/18 then 12/22/18 (actual date is 12/24/18)    Call if any problems

## 2018-10-23 DIAGNOSIS — R10.10 PAIN OF UPPER ABDOMEN: ICD-10-CM

## 2018-10-24 RX ORDER — GABAPENTIN 300 MG/1
CAPSULE ORAL
Qty: 90 CAPSULE | Refills: 0 | Status: SHIPPED | OUTPATIENT
Start: 2018-10-24 | End: 2018-11-23 | Stop reason: SDUPTHER

## 2018-11-23 DIAGNOSIS — R10.10 PAIN OF UPPER ABDOMEN: ICD-10-CM

## 2018-11-27 ENCOUNTER — TELEPHONE (OUTPATIENT)
Dept: NEUROLOGY | Facility: CLINIC | Age: 48
End: 2018-11-27

## 2018-11-27 RX ORDER — GABAPENTIN 300 MG/1
CAPSULE ORAL
Qty: 90 CAPSULE | Refills: 0 | Status: SHIPPED | OUTPATIENT
Start: 2018-11-27 | End: 2018-12-21 | Stop reason: SDUPTHER

## 2018-11-27 NOTE — TELEPHONE ENCOUNTER
Pt was sent [employer] enrollment form to complete and sent back 05/29/18 This was never sent back to us.

## 2018-12-21 DIAGNOSIS — R10.10 PAIN OF UPPER ABDOMEN: ICD-10-CM

## 2018-12-24 RX ORDER — GABAPENTIN 300 MG/1
CAPSULE ORAL
Qty: 90 CAPSULE | Refills: 0 | Status: SHIPPED | OUTPATIENT
Start: 2018-12-24 | End: 2019-01-08 | Stop reason: SDUPTHER

## 2019-01-08 DIAGNOSIS — R10.11 ABDOMINAL PAIN, CHRONIC, RIGHT UPPER QUADRANT: ICD-10-CM

## 2019-01-08 DIAGNOSIS — Z79.891 LONG-TERM CURRENT USE OF OPIATE ANALGESIC: ICD-10-CM

## 2019-01-08 DIAGNOSIS — G89.29 ABDOMINAL PAIN, CHRONIC, RIGHT UPPER QUADRANT: ICD-10-CM

## 2019-01-08 DIAGNOSIS — R10.10 PAIN OF UPPER ABDOMEN: ICD-10-CM

## 2019-01-08 RX ORDER — GABAPENTIN 300 MG/1
300 CAPSULE ORAL 3 TIMES DAILY
Qty: 90 CAPSULE | Refills: 3 | Status: SHIPPED | OUTPATIENT
Start: 2019-01-08 | End: 2019-05-11 | Stop reason: SDUPTHER

## 2019-01-08 RX ORDER — TRAMADOL HYDROCHLORIDE 50 MG/1
50-100 TABLET ORAL EVERY 4 HOURS PRN
Qty: 240 TABLET | Refills: 0 | Status: SHIPPED | OUTPATIENT
Start: 2019-01-08 | End: 2019-02-17 | Stop reason: SDUPTHER

## 2019-01-21 DIAGNOSIS — Z79.891 LONG-TERM CURRENT USE OF OPIATE ANALGESIC: ICD-10-CM

## 2019-01-21 DIAGNOSIS — R10.11 ABDOMINAL PAIN, CHRONIC, RIGHT UPPER QUADRANT: ICD-10-CM

## 2019-01-21 DIAGNOSIS — G89.29 ABDOMINAL PAIN, CHRONIC, RIGHT UPPER QUADRANT: ICD-10-CM

## 2019-01-21 RX ORDER — TRAMADOL HYDROCHLORIDE 50 MG/1
TABLET ORAL
Qty: 240 TABLET | Refills: 0 | OUTPATIENT
Start: 2019-01-21

## 2019-02-17 DIAGNOSIS — G89.29 ABDOMINAL PAIN, CHRONIC, RIGHT UPPER QUADRANT: ICD-10-CM

## 2019-02-17 DIAGNOSIS — Z79.891 LONG-TERM CURRENT USE OF OPIATE ANALGESIC: ICD-10-CM

## 2019-02-17 DIAGNOSIS — R10.11 ABDOMINAL PAIN, CHRONIC, RIGHT UPPER QUADRANT: ICD-10-CM

## 2019-02-18 RX ORDER — TRAMADOL HYDROCHLORIDE 50 MG/1
TABLET ORAL
Qty: 240 TABLET | Refills: 0 | Status: SHIPPED | OUTPATIENT
Start: 2019-02-18 | End: 2019-02-20 | Stop reason: SDUPTHER

## 2019-02-20 DIAGNOSIS — Z79.891 LONG-TERM CURRENT USE OF OPIATE ANALGESIC: ICD-10-CM

## 2019-02-20 DIAGNOSIS — G89.29 ABDOMINAL PAIN, CHRONIC, RIGHT UPPER QUADRANT: ICD-10-CM

## 2019-02-20 DIAGNOSIS — R10.11 ABDOMINAL PAIN, CHRONIC, RIGHT UPPER QUADRANT: ICD-10-CM

## 2019-02-20 RX ORDER — TRAMADOL HYDROCHLORIDE 50 MG/1
50-100 TABLET ORAL EVERY 4 HOURS PRN
Qty: 240 TABLET | Refills: 0 | Status: SHIPPED | OUTPATIENT
Start: 2019-02-20 | End: 2019-02-22 | Stop reason: WASHOUT

## 2019-02-20 NOTE — TELEPHONE ENCOUNTER
Pharmacy is requesting that the prescription that was sent on 2/18/2019 have a max daily dose listed.

## 2019-02-21 ENCOUNTER — OFFICE VISIT (OUTPATIENT)
Dept: PAIN MEDICINE | Facility: CLINIC | Age: 49
End: 2019-02-21
Payer: COMMERCIAL

## 2019-02-21 VITALS
OXYGEN SATURATION: 94 % | DIASTOLIC BLOOD PRESSURE: 87 MMHG | RESPIRATION RATE: 16 BRPM | HEART RATE: 76 BPM | SYSTOLIC BLOOD PRESSURE: 143 MMHG

## 2019-02-21 DIAGNOSIS — R10.11 ABDOMINAL PAIN, CHRONIC, RIGHT UPPER QUADRANT: Primary | ICD-10-CM

## 2019-02-21 DIAGNOSIS — G89.29 ABDOMINAL PAIN, CHRONIC, RIGHT UPPER QUADRANT: Primary | ICD-10-CM

## 2019-02-21 DIAGNOSIS — Z79.891 LONG-TERM CURRENT USE OF OPIATE ANALGESIC: ICD-10-CM

## 2019-02-21 PROCEDURE — G0463 HOSPITAL OUTPT CLINIC VISIT: HCPCS

## 2019-02-21 RX ORDER — TRAMADOL HYDROCHLORIDE 50 MG/1
50-100 TABLET ORAL EVERY 4 HOURS PRN
Qty: 240 TABLET | Refills: 0 | Status: SHIPPED | OUTPATIENT
Start: 2019-04-22 | End: 2019-02-22

## 2019-02-21 RX ORDER — DICYCLOMINE HYDROCHLORIDE 10 MG/1
10 CAPSULE ORAL
Qty: 120 CAPSULE | Refills: 0 | Status: SHIPPED | OUTPATIENT
Start: 2019-02-21 | End: 2019-09-19

## 2019-02-21 RX ORDER — TRAMADOL HYDROCHLORIDE 50 MG/1
50-100 TABLET ORAL EVERY 4 HOURS PRN
Qty: 240 TABLET | Refills: 0 | Status: SHIPPED | OUTPATIENT
Start: 2019-05-22 | End: 2019-02-22

## 2019-02-21 RX ORDER — TRAMADOL HYDROCHLORIDE 50 MG/1
50-100 TABLET ORAL EVERY 4 HOURS PRN
Qty: 240 TABLET | Refills: 0 | Status: SHIPPED | OUTPATIENT
Start: 2019-03-23 | End: 2019-02-22

## 2019-02-21 ASSESSMENT — ENCOUNTER SYMPTOMS
ABDOMINAL DISTENTION: 1
NECK PAIN: 0
DIARRHEA: 0
ABDOMINAL PAIN: 1
VOMITING: 1
BACK PAIN: 0
JOINT SWELLING: 0
MYALGIAS: 0
NAUSEA: 1
CONSTIPATION: 0

## 2019-02-21 NOTE — PROGRESS NOTES
Subjective     Patient ID: Ivonne Giron is a 48 y.o. female.    HPI  Here for med review.  Still with abd pressure and pain and now cant wear pants to her abd due to pain.  She said that pain is constant to the middle of her abdomen and she still has episodes that become severe.  She is wondering if she should apply for disability.    Pain Score: 6  Pain Type: Chronic pain  Pain Location: Abdomen  Pain Radiating Towards: arounds ribs  Pain Descriptors: Sharp  Pain Frequency: Intermittent  Pain Onset: Awakened from sleep  Clinical Progression: Not changed  Effect of Pain on Daily Activities: pain increases with activity  Pain Interventions: Medication (See MAR), Rest      Review of Systems   Gastrointestinal: Positive for abdominal distention, abdominal pain, nausea and vomiting. Negative for constipation and diarrhea.   Musculoskeletal: Negative for back pain, joint swelling, myalgias and neck pain.       Objective   /87   Pulse 76   Resp 16   SpO2 94%       Current Outpatient Medications:   •  baclofen (LIORESAL) 10 mg tablet, Take 1 tablet (10 mg total) by mouth 3 (three) times a day as needed for muscle spasms., Disp: 90 tablet, Rfl: 0  •  butalbital-acetaminophen-caff (FIORICET, ESGIC) -40 mg, Take 1 tablet by mouth every 4 (four) hours as needed.  , Disp: , Rfl:   •  calcium carbonate-vitamin D3 (CALCIUM 500 WITH D) 500 mg(1,250mg) -400 unit tablet, Take 1 tablet by mouth daily.  , Disp: , Rfl:   •  FLUoxetine (PROzac) 20 mg capsule, Take 20 mg by mouth daily., Disp: , Rfl:   •  gabapentin (NEURONTIN) 300 mg capsule, Take 1 capsule (300 mg total) by mouth 3 (three) times a day, Disp: 90 capsule, Rfl: 3  •  hydrochlorothiazide (HYDRODIURIL) 25 mg tablet, Take 25 mg by mouth daily.  , Disp: , Rfl:   •  ibuprofen (MOTRIN IB) 200 mg tablet, Take 200 mg by mouth as needed.  , Disp: , Rfl:   •  losartan (COZAAR) 100 mg tablet, Take by mouth daily.  , Disp: , Rfl:   •  ondansetron ODT (ZOFRAN-ODT)  4 mg disintegrating tablet, Take 1 tablet (4 mg total) by mouth every 8 (eight) hours as needed for nausea or vomiting. (Patient not taking: Reported on 9/25/2018 ), Disp: 12 tablet, Rfl: 0  •  prenatal 25-iron fum-folic-dha (PRENATAL-1) -200 mg-mcg-mg capsule, Take 1 capsule by mouth daily., Disp: , Rfl:   •  traMADol (ULTRAM 50) 50 mg tablet, Take 50 mg by mouth every 6 (six) hours as needed for pain scale 8-10/10., Disp: , Rfl:   •  traMADol (ULTRAM 50) 50 mg tablet, Take 1-2 tablets ( mg total) by mouth every 4 (four) hours as needed for pain scale 8-10/10 Max 8 per day, Disp: 240 tablet, Rfl: 0    Imaging:  No results found.      Physical Exam   Constitutional: She is oriented to person, place, and time. She appears well-developed and well-nourished. No distress.   HENT:   Head: Normocephalic.   Eyes: Conjunctivae are normal.   Neck: Neck supple.   Cardiovascular: Normal rate.   No murmur heard.  Pulmonary/Chest: Effort normal. No respiratory distress.   Abdominal: Soft. She exhibits no distension. There is tenderness.       Musculoskeletal: Normal range of motion. She exhibits no edema, tenderness or deformity.        Neurological: She is alert and oriented to person, place, and time.   Skin: Skin is warm and dry. No rash noted.   Psychiatric: She has a normal mood and affect. Her behavior is normal. Judgment and thought content normal.   Nursing note and vitals reviewed.      Assessment/Plan     1. Abdominal pain, chronic, right upper quadrant    2. Long-term current use of opiate analgesic      PDMP  reviewed.  MME =40.  Narcannot needed. Aberrant behavior = no. Affect/Mood = about the same. ADL = independent. Analgesia = adequate. Adverse Side Effects = no. Goals have been addressed and discussed with this patient.  The patient was advised that if at any time they feel oversedated to stop the medication and seek emergency care.    Continue her tramadol and she said she just takes this all day  long and it numbs the pain a little bit.  She wants me to send her a letter that she comes here in case she applies for disability.  She hasn't tried bentyl before she thinks and meds continue to help. She has episodes and gets sweaty with these episodes and then vomits.  Will try bentyl and told her this may just help her not vomit but not sure to help with the pain.  Remain on the tramadol as before.                DISCUSSION  Today's plan was a combined effort between the patient and myself. The patient understood the plan, verbally consented, and agreed to participate. An After Visit Summary with special instructions/information regarding today's office visit was given to the patient (see patient instructions).     The diagnostic assessment has been reviewed. Patient and/or patient's surrogate has expressed a good understanding of the assessment and recommendations from today's visit. There are no apparent barriers to understanding this information. Patient’s questions were addressed.  I have reviewed the patients current medications using all immediate resources available.     Patient has been advised to contact this office or the answering service with questions or concerns that may arise. Patient has been advised to follow up with Primary Care Provider for general medical needs.     A total of 20 minutes was required for this visit. Greater than 50% of this time was spent in direct face to face communication with the patient and/or surrogate in order to provide counseling regarding meds and trying a new med, disability and options.    Roberto Elliott, CNP

## 2019-02-21 NOTE — PATIENT INSTRUCTIONS
Try bentyl and call if if decreases your symptoms somewhat and it can be taken before meals if it helps, call for refill

## 2019-02-21 NOTE — LETTER
02/21/19       PAIN MANAGEMENT  [address] [facility] [address]  [phone]  Dept: [phone]      To Whom It May Concern:    Ivonne is a patient in our pain management clinic.  She has chronic abdominal pain and is on chronic opioids for this condition.  She has failed other interventional treatments.    If you require further information about this patient please feel free to contact us.    Sincerely,    Roberto Elliott, CNP  [facility]

## 2019-02-22 DIAGNOSIS — G89.29 ABDOMINAL PAIN, CHRONIC, RIGHT UPPER QUADRANT: ICD-10-CM

## 2019-02-22 DIAGNOSIS — Z79.891 LONG-TERM CURRENT USE OF OPIATE ANALGESIC: Primary | ICD-10-CM

## 2019-02-22 DIAGNOSIS — R10.11 ABDOMINAL PAIN, CHRONIC, RIGHT UPPER QUADRANT: ICD-10-CM

## 2019-02-22 RX ORDER — TRAMADOL HYDROCHLORIDE 50 MG/1
50-100 TABLET ORAL EVERY 4 HOURS PRN
Qty: 240 TABLET | Refills: 0 | Status: SHIPPED | OUTPATIENT
Start: 2019-05-22 | End: 2019-09-19 | Stop reason: WASHOUT

## 2019-02-22 RX ORDER — TRAMADOL HYDROCHLORIDE 50 MG/1
50-100 TABLET ORAL EVERY 4 HOURS PRN
Qty: 240 TABLET | Refills: 0 | Status: SHIPPED | OUTPATIENT
Start: 2019-03-23 | End: 2019-06-13 | Stop reason: WASHOUT

## 2019-02-22 RX ORDER — TRAMADOL HYDROCHLORIDE 50 MG/1
50-100 TABLET ORAL EVERY 4 HOURS PRN
Qty: 240 TABLET | Refills: 0 | Status: SHIPPED | OUTPATIENT
Start: 2019-04-22 | End: 2019-06-13 | Stop reason: WASHOUT

## 2019-05-11 DIAGNOSIS — R10.10 PAIN OF UPPER ABDOMEN: ICD-10-CM

## 2019-05-13 RX ORDER — GABAPENTIN 300 MG/1
CAPSULE ORAL
Qty: 90 CAPSULE | Refills: 3 | Status: SHIPPED | OUTPATIENT
Start: 2019-05-13 | End: 2019-09-07 | Stop reason: SDUPTHER

## 2019-06-13 ENCOUNTER — OFFICE VISIT (OUTPATIENT)
Dept: PAIN MEDICINE | Facility: CLINIC | Age: 49
End: 2019-06-13
Payer: COMMERCIAL

## 2019-06-13 VITALS
SYSTOLIC BLOOD PRESSURE: 132 MMHG | RESPIRATION RATE: 16 BRPM | DIASTOLIC BLOOD PRESSURE: 82 MMHG | OXYGEN SATURATION: 94 % | HEART RATE: 73 BPM

## 2019-06-13 DIAGNOSIS — Z79.891 LONG-TERM CURRENT USE OF OPIATE ANALGESIC: ICD-10-CM

## 2019-06-13 DIAGNOSIS — G43.019 INTRACTABLE MIGRAINE WITHOUT AURA AND WITHOUT STATUS MIGRAINOSUS: ICD-10-CM

## 2019-06-13 DIAGNOSIS — G89.29 ABDOMINAL PAIN, CHRONIC, RIGHT UPPER QUADRANT: Primary | ICD-10-CM

## 2019-06-13 DIAGNOSIS — R10.11 ABDOMINAL PAIN, CHRONIC, RIGHT UPPER QUADRANT: Primary | ICD-10-CM

## 2019-06-13 PROCEDURE — G0463 HOSPITAL OUTPT CLINIC VISIT: HCPCS

## 2019-06-13 RX ORDER — TRAMADOL HYDROCHLORIDE 50 MG/1
100 TABLET ORAL EVERY 6 HOURS PRN
Qty: 240 TABLET | Refills: 0 | Status: SHIPPED | OUTPATIENT
Start: 2019-06-22 | End: 2020-02-06 | Stop reason: WASHOUT

## 2019-06-13 RX ORDER — TRAMADOL HYDROCHLORIDE 50 MG/1
100 TABLET ORAL EVERY 6 HOURS PRN
Qty: 240 TABLET | Refills: 0 | Status: SHIPPED | OUTPATIENT
Start: 2019-07-22 | End: 2020-02-06 | Stop reason: WASHOUT

## 2019-06-13 RX ORDER — DICYCLOMINE HYDROCHLORIDE 10 MG/1
10 CAPSULE ORAL 4 TIMES DAILY PRN
Qty: 120 CAPSULE | Refills: 3 | Status: SHIPPED | OUTPATIENT
Start: 2019-06-13 | End: 2019-07-13

## 2019-06-13 RX ORDER — BUPRENORPHINE 5 UG/H
1 PATCH TRANSDERMAL WEEKLY
Qty: 4 PATCH | Refills: 0 | Status: SHIPPED | OUTPATIENT
Start: 2019-06-13 | End: 2019-07-13

## 2019-06-13 RX ORDER — TRAMADOL HYDROCHLORIDE 50 MG/1
100 TABLET ORAL EVERY 6 HOURS PRN
Qty: 240 TABLET | Refills: 0 | Status: SHIPPED | OUTPATIENT
Start: 2019-08-21 | End: 2020-02-06 | Stop reason: WASHOUT

## 2019-06-13 ASSESSMENT — PAIN DESCRIPTION - DESCRIPTORS: DESCRIPTORS: SHARP

## 2019-06-13 ASSESSMENT — ENCOUNTER SYMPTOMS
NAUSEA: 1
VOMITING: 1
MYALGIAS: 0
ABDOMINAL PAIN: 1
NECK PAIN: 0
BACK PAIN: 0
HEADACHES: 1
DIARRHEA: 0
SLEEP DISTURBANCE: 0
ARTHRALGIAS: 0
CONSTIPATION: 0
ABDOMINAL DISTENTION: 0
JOINT SWELLING: 0

## 2019-06-13 ASSESSMENT — PAIN - FUNCTIONAL ASSESSMENT: PAIN_FUNCTIONAL_ASSESSMENT: 0-10

## 2019-06-13 NOTE — PROGRESS NOTES
Subjective     Patient ID: Ivonne Giron is a 48 y.o. female.    HPI  Here for med review and she cant remember if she took bentyl but then not get refills. She wonders if it worked but has been dealing with more pain to her abdomen.  The pain is spreading more around the sides of her abdomen now.     She is still having some nausea and vomiting when it occurs.      She is trying for disability and still with sensitivity to the abd and its hard to wear clothes.     She saw her PCP and wonders if she should try different medications as the tramadol is probably not working. She asked about codeine and hydrocodone.     Pain Score: 5 - Moderate pain(best 5, worst 10)  Pain Type: Chronic pain  Pain Location: Abdomen  Pain Radiating Towards: around to sides of abdomen  Pain Descriptors: Sharp  Pain Frequency: Intermittent  Pain Onset: Awakened from sleep  Clinical Progression: Gradually worsening  Pain Interventions: Medication (See MAR), Rest  Response to Interventions: Pills not helping much for pain      Review of Systems   Gastrointestinal: Positive for abdominal pain, nausea (at times) and vomiting (at times). Negative for abdominal distention, constipation and diarrhea.   Musculoskeletal: Negative for arthralgias, back pain, joint swelling, myalgias and neck pain.   Neurological: Positive for headaches.   Psychiatric/Behavioral: Negative for sleep disturbance.       Objective   /82 (BP Location: Right arm, Patient Position: Sitting)   Pulse 73   Resp 16   SpO2 94%       Current Outpatient Medications:   •  gabapentin (NEURONTIN) 300 mg capsule, TAKE 1 CAPSULE BY MOUTH THREE TIMES DAILY, Disp: 90 capsule, Rfl: 3  •  traMADol (ULTRAM 50) 50 mg tablet, Take 1-2 tablets ( mg total) by mouth every 4 (four) hours as needed for pain scale 8-10/10 Max 8 daily, Disp: 240 tablet, Rfl: 0  •  prenatal 25-iron fum-folic-dha (PRENATAL-1) -200 mg-mcg-mg capsule, Take 1 capsule by mouth daily., Disp: ,  Rfl:   •  calcium carbonate-vitamin D3 (CALCIUM 500 WITH D) 500 mg(1,250mg) -400 unit tablet, Take 1 tablet by mouth daily.  , Disp: , Rfl:   •  hydrochlorothiazide (HYDRODIURIL) 25 mg tablet, Take 25 mg by mouth daily.  , Disp: , Rfl:   •  FLUoxetine (PROzac) 20 mg capsule, Take 20 mg by mouth daily., Disp: , Rfl:   •  losartan (COZAAR) 100 mg tablet, Take by mouth daily.  , Disp: , Rfl:   •  dicyclomine (BENTYL) 10 mg capsule, Take 1 capsule (10 mg total) by mouth 4 (four) times a day (before meals and nightly) (Patient not taking: Reported on 6/13/2019 ), Disp: 120 capsule, Rfl: 0  •  baclofen (LIORESAL) 10 mg tablet, Take 1 tablet (10 mg total) by mouth 3 (three) times a day as needed for muscle spasms., Disp: 90 tablet, Rfl: 0  •  ondansetron ODT (ZOFRAN-ODT) 4 mg disintegrating tablet, Take 1 tablet (4 mg total) by mouth every 8 (eight) hours as needed for nausea or vomiting. (Patient not taking: Reported on 9/25/2018 ), Disp: 12 tablet, Rfl: 0  •  butalbital-acetaminophen-caff (FIORICET, ESGIC) -40 mg, Take 1 tablet by mouth every 4 (four) hours as needed.  , Disp: , Rfl:   •  ibuprofen (MOTRIN IB) 200 mg tablet, Take 200 mg by mouth as needed.  , Disp: , Rfl:     Imaging:  No results found.      Physical Exam   Constitutional: She is oriented to person, place, and time. She appears well-developed and well-nourished. No distress.   HENT:   Head: Normocephalic.   Eyes: Conjunctivae are normal.   Neck: Neck supple.   Cardiovascular: Normal rate.   Pulmonary/Chest: Effort normal. No respiratory distress.   Abdominal: She exhibits no distension.       Musculoskeletal: She exhibits tenderness. She exhibits no edema or deformity.        Neurological: She is alert and oriented to person, place, and time.   Skin: Skin is warm and dry. No rash noted.   Psychiatric: She has a normal mood and affect. Her behavior is normal. Judgment and thought content normal.   Nursing note and vitals  "reviewed.      Assessment/Plan     1. Abdominal pain, chronic, right upper quadrant    2. Long-term current use of opiate analgesic    3. Intractable migraine without aura and without status migrainosus      PDMP  reviewed.  MME =40.  Narcandone. Aberrant behavior = no. Affect/Mood = about the same. ADL = independent. Analgesia = inadequate. Adverse Side Effects = no. Goals have been addressed and discussed with this patient.  The patient was advised that if at any time they feel oversedated to stop the medication and seek emergency care.      Now she is taking tramadol all day long , 2 am, 2 afternoon, 2 dinner, 2 bedtime and doesn't wait until pain and takes it all day long and still has abd attacks.  Im not sure how she is doing with these meds and if they have stopped working or not.  Will try butrans and see if this will cover her better and get her to use less tramadol.  I still am not sure her mechanism of pain and we discussed other options.  She thinks she has about 3 attacks a day.  She is on gabapentin 2x/day and wonders if it helps and if she takes too much she gets sleepy.     She has migraines daily and takes fioricet daily for this even though she told the nurse she doesn't take it \"thought it was my blood pressure medicine\".    She has been on tramadol for many years and takes it throughout the day and still with episodes so will add to it and make sure she is treating her pain accordingly and encouraged her to report back how she is doing and not wait until appointment in case we have to change meds.      Tried: methocarbomol, baclofen, tizanidine with hives, injections dont help    I am also sending over bentyl again and will see if this calms down her abd pain and will make sure she takes it but keep it prn for now and see if if decreases her episodes.       DISCUSSION  Today's plan was a combined effort between the patient and myself. The patient understood the plan, verbally consented, and " agreed to participate. An After Visit Summary with special instructions/information regarding today's office visit was given to the patient (see patient instructions).     The diagnostic assessment has been reviewed. Patient and/or patient's surrogate has expressed a good understanding of the assessment and recommendations from today's visit. There are no apparent barriers to understanding this information. Patient’s questions were addressed.  I have reviewed the patients current medications using all immediate resources available.     Patient has been advised to contact this office or the answering service with questions or concerns that may arise. Patient has been advised to follow up with Primary Care Provider for general medical needs.     A total of 20 minutes was required for this visit. Greater than 50% of this time was spent in direct face to face communication with the patient and/or surrogate in order to provide counseling regardingmeds and options and changing meds.    Roberto Elliott, CNP

## 2019-06-13 NOTE — PATIENT INSTRUCTIONS
1. Go to pharmacy today and start butrans patch and leave on weekly and call if you want refill  2. Try bentyl as needed for abdominal pain up to 4x/day and call with update [phone]  3. Your tramadol is due 6/22, 7/22, 8/21

## 2019-08-21 DIAGNOSIS — Z79.891 LONG-TERM CURRENT USE OF OPIATE ANALGESIC: ICD-10-CM

## 2019-08-21 DIAGNOSIS — R10.11 ABDOMINAL PAIN, CHRONIC, RIGHT UPPER QUADRANT: ICD-10-CM

## 2019-08-21 DIAGNOSIS — G89.29 ABDOMINAL PAIN, CHRONIC, RIGHT UPPER QUADRANT: ICD-10-CM

## 2019-08-21 DIAGNOSIS — G43.019 INTRACTABLE MIGRAINE WITHOUT AURA AND WITHOUT STATUS MIGRAINOSUS: ICD-10-CM

## 2019-08-21 RX ORDER — TRAMADOL HYDROCHLORIDE 50 MG/1
TABLET ORAL
Qty: 240 TABLET | Refills: 0 | OUTPATIENT
Start: 2019-08-21

## 2019-09-07 DIAGNOSIS — R10.10 PAIN OF UPPER ABDOMEN: ICD-10-CM

## 2019-09-09 RX ORDER — GABAPENTIN 300 MG/1
CAPSULE ORAL
Qty: 90 CAPSULE | Refills: 3 | Status: SHIPPED | OUTPATIENT
Start: 2019-09-09 | End: 2020-01-03

## 2019-09-19 ENCOUNTER — OFFICE VISIT (OUTPATIENT)
Dept: PAIN MEDICINE | Facility: HOSPITAL | Age: 49
End: 2019-09-19
Payer: COMMERCIAL

## 2019-09-19 VITALS — SYSTOLIC BLOOD PRESSURE: 139 MMHG | RESPIRATION RATE: 16 BRPM | HEART RATE: 68 BPM | DIASTOLIC BLOOD PRESSURE: 76 MMHG

## 2019-09-19 DIAGNOSIS — G89.29 ABDOMINAL PAIN, CHRONIC, RIGHT UPPER QUADRANT: ICD-10-CM

## 2019-09-19 DIAGNOSIS — Z79.891 ENCOUNTER FOR LONG-TERM OPIATE ANALGESIC USE: ICD-10-CM

## 2019-09-19 DIAGNOSIS — R10.11 ABDOMINAL PAIN, CHRONIC, RIGHT UPPER QUADRANT: ICD-10-CM

## 2019-09-19 DIAGNOSIS — Z79.891 LONG-TERM CURRENT USE OF OPIATE ANALGESIC: ICD-10-CM

## 2019-09-19 DIAGNOSIS — G89.4 CHRONIC PAIN DISORDER: Primary | ICD-10-CM

## 2019-09-19 PROCEDURE — G0463 HOSPITAL OUTPT CLINIC VISIT: HCPCS

## 2019-09-19 PROCEDURE — 99001 SPECIMEN HANDLING PT-LAB: CPT

## 2019-09-19 RX ORDER — TRAMADOL HYDROCHLORIDE 50 MG/1
100 TABLET ORAL EVERY 6 HOURS PRN
Qty: 240 TABLET | Refills: 0 | Status: SHIPPED | OUTPATIENT
Start: 2019-09-20 | End: 2020-02-06 | Stop reason: WASHOUT

## 2019-09-19 RX ORDER — TRAMADOL HYDROCHLORIDE 50 MG/1
100 TABLET ORAL EVERY 6 HOURS PRN
Qty: 240 TABLET | Refills: 0 | Status: SHIPPED | OUTPATIENT
Start: 2019-10-20 | End: 2020-02-06 | Stop reason: WASHOUT

## 2019-09-19 RX ORDER — DICYCLOMINE HYDROCHLORIDE 10 MG/1
10 CAPSULE ORAL
Qty: 120 CAPSULE | Refills: 3 | Status: SHIPPED | OUTPATIENT
Start: 2019-09-19 | End: 2019-10-19

## 2019-09-19 RX ORDER — TRAMADOL HYDROCHLORIDE 50 MG/1
100 TABLET ORAL EVERY 6 HOURS PRN
Qty: 240 TABLET | Refills: 0 | Status: SHIPPED | OUTPATIENT
Start: 2019-11-19 | End: 2019-12-17 | Stop reason: SDUPTHER

## 2019-09-19 ASSESSMENT — PAIN DESCRIPTION - DESCRIPTORS: DESCRIPTORS: SHARP;RADIATING

## 2019-09-19 ASSESSMENT — ENCOUNTER SYMPTOMS
ARTHRALGIAS: 0
ABDOMINAL DISTENTION: 0
MYALGIAS: 0
ABDOMINAL PAIN: 1
SLEEP DISTURBANCE: 0
DIARRHEA: 0
NAUSEA: 1
CONSTIPATION: 0

## 2019-09-19 ASSESSMENT — PAIN - FUNCTIONAL ASSESSMENT: PAIN_FUNCTIONAL_ASSESSMENT: 0-10
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
## 2019-09-19 NOTE — PATIENT INSTRUCTIONS
You can  your tramadol today and start tomorrow then 10/20 and 11/19    Try bentyl before meals or before your abdomen attacks and see if it helps and call if you are unable to pick it up

## 2019-09-25 LAB — DRUGS UR: NORMAL

## 2019-11-05 ENCOUNTER — TELEPHONE (OUTPATIENT)
Dept: NEUROLOGY | Facility: CLINIC | Age: 49
End: 2019-11-05

## 2019-11-05 NOTE — TELEPHONE ENCOUNTER
Placed call to ANTHONY Kaye's office as rcvd referral for this pt.  I was confused as ANTHONY Kaye's note indicates our office is prescribing Butalbital for this pt's HA and she is overusing it.  We are not prescribing this medication and Harini is not sure who is but she has been using a lot of it.  I explained to Harini that Pt is established here already and she just needs to call to schedule a f/u apnt.  Harini voiced understanding and will be contacting pt to find out who is prescribing butalbital and to tell her to call us to schedule.

## 2019-12-16 DIAGNOSIS — Z79.891 ENCOUNTER FOR LONG-TERM OPIATE ANALGESIC USE: ICD-10-CM

## 2019-12-16 DIAGNOSIS — Z79.891 LONG-TERM CURRENT USE OF OPIATE ANALGESIC: ICD-10-CM

## 2019-12-16 DIAGNOSIS — G89.4 CHRONIC PAIN DISORDER: ICD-10-CM

## 2019-12-16 DIAGNOSIS — R10.11 ABDOMINAL PAIN, CHRONIC, RIGHT UPPER QUADRANT: ICD-10-CM

## 2019-12-16 DIAGNOSIS — G89.29 ABDOMINAL PAIN, CHRONIC, RIGHT UPPER QUADRANT: ICD-10-CM

## 2019-12-16 RX ORDER — TRAMADOL HYDROCHLORIDE 50 MG/1
TABLET ORAL
Qty: 240 TABLET | Refills: 0 | OUTPATIENT
Start: 2019-12-16

## 2019-12-17 DIAGNOSIS — G89.29 ABDOMINAL PAIN, CHRONIC, RIGHT UPPER QUADRANT: ICD-10-CM

## 2019-12-17 DIAGNOSIS — Z79.891 ENCOUNTER FOR LONG-TERM OPIATE ANALGESIC USE: ICD-10-CM

## 2019-12-17 DIAGNOSIS — Z79.891 LONG-TERM CURRENT USE OF OPIATE ANALGESIC: ICD-10-CM

## 2019-12-17 DIAGNOSIS — G89.4 CHRONIC PAIN DISORDER: ICD-10-CM

## 2019-12-17 DIAGNOSIS — R10.11 ABDOMINAL PAIN, CHRONIC, RIGHT UPPER QUADRANT: ICD-10-CM

## 2019-12-17 RX ORDER — TRAMADOL HYDROCHLORIDE 50 MG/1
100 TABLET ORAL EVERY 6 HOURS PRN
Qty: 240 TABLET | Refills: 0 | Status: SHIPPED | OUTPATIENT
Start: 2019-12-19 | End: 2020-01-20 | Stop reason: SDUPTHER

## 2019-12-17 NOTE — TELEPHONE ENCOUNTER
Patient called requesting to  her Tramadol for December today while she is in town because she does not want to come back to town later this week due to [employer] tournament. Per PDMP last filled 11/19/19 #240. She has cancelled appointments scheduled on 12/12 and today, 12/17 (late cancellation). She is currently rescheduled to 1/9/20.

## 2020-01-03 DIAGNOSIS — R10.10 PAIN OF UPPER ABDOMEN: ICD-10-CM

## 2020-01-03 RX ORDER — GABAPENTIN 300 MG/1
CAPSULE ORAL
Qty: 90 CAPSULE | Refills: 3 | Status: SHIPPED | OUTPATIENT
Start: 2020-01-03 | End: 2020-04-22

## 2020-01-20 DIAGNOSIS — G89.4 CHRONIC PAIN DISORDER: ICD-10-CM

## 2020-01-20 DIAGNOSIS — Z79.891 LONG-TERM CURRENT USE OF OPIATE ANALGESIC: ICD-10-CM

## 2020-01-20 DIAGNOSIS — Z79.891 ENCOUNTER FOR LONG-TERM OPIATE ANALGESIC USE: ICD-10-CM

## 2020-01-20 DIAGNOSIS — G89.29 ABDOMINAL PAIN, CHRONIC, RIGHT UPPER QUADRANT: ICD-10-CM

## 2020-01-20 DIAGNOSIS — R10.11 ABDOMINAL PAIN, CHRONIC, RIGHT UPPER QUADRANT: ICD-10-CM

## 2020-01-20 RX ORDER — TRAMADOL HYDROCHLORIDE 50 MG/1
TABLET ORAL
Qty: 240 TABLET | Refills: 0 | OUTPATIENT
Start: 2020-01-20

## 2020-01-20 RX ORDER — TRAMADOL HYDROCHLORIDE 50 MG/1
100 TABLET ORAL EVERY 6 HOURS PRN
Qty: 240 TABLET | Refills: 0 | Status: SHIPPED | OUTPATIENT
Start: 2020-01-20 | End: 2020-05-12 | Stop reason: WASHOUT

## 2020-01-20 NOTE — TELEPHONE ENCOUNTER
Patient left voice message requesting refill of Tramadol. Per PDMP #240 was last filled on 12/17/19. She was last seen by Roberto on  9/19/19. Patient has cancelled last 3 appointments that were scheduled for the following reasons: conflict in appointment times, late cancellation, and family emergency. No future appointment scheduled at this time.

## 2020-02-06 ENCOUNTER — OFFICE VISIT (OUTPATIENT)
Dept: PAIN MEDICINE | Facility: HOSPITAL | Age: 50
End: 2020-02-06
Payer: COMMERCIAL

## 2020-02-06 DIAGNOSIS — Z86.69 HISTORY OF SEIZURE DISORDER: ICD-10-CM

## 2020-02-06 DIAGNOSIS — R10.11 ABDOMINAL PAIN, CHRONIC, RIGHT UPPER QUADRANT: Primary | ICD-10-CM

## 2020-02-06 DIAGNOSIS — Z79.891 LONG-TERM CURRENT USE OF OPIATE ANALGESIC: ICD-10-CM

## 2020-02-06 DIAGNOSIS — G89.29 ABDOMINAL PAIN, CHRONIC, RIGHT UPPER QUADRANT: Primary | ICD-10-CM

## 2020-02-06 PROCEDURE — G0463 HOSPITAL OUTPT CLINIC VISIT: HCPCS

## 2020-02-06 RX ORDER — OMEPRAZOLE 20 MG/1
20 CAPSULE, DELAYED RELEASE ORAL DAILY
COMMUNITY
Start: 2020-01-20 | End: 2021-08-23

## 2020-02-06 RX ORDER — VITAMIN A, VITAMIN C, VITAMIN D, VITAMIN E, THIAMINE, RIBOFLAVIN, NIACIN, VITAMIN B6, FOLIC ACID, VITAMIN B12, CALCIUM, IRON, ZINC, COPPER 4000; 120; 400; 22; 1.84; 3; 20; 10; 1; 12; 200; 27; 25; 2 [IU]/1; MG/1; [IU]/1; [IU]/1; MG/1; MG/1; MG/1; MG/1; MG/1; UG/1; MG/1; MG/1; MG/1; MG/1
1 TABLET ORAL DAILY
COMMUNITY
Start: 2020-01-20 | End: 2023-05-24 | Stop reason: ALTCHOICE

## 2020-02-06 RX ORDER — CETIRIZINE HYDROCHLORIDE 10 MG/1
10 TABLET ORAL AS NEEDED
COMMUNITY
Start: 2020-01-20

## 2020-02-06 RX ORDER — POTASSIUM CHLORIDE 20 MEQ/1
20 TABLET, EXTENDED RELEASE ORAL DAILY
Refills: 1 | Status: ON HOLD | COMMUNITY
Start: 2019-11-08 | End: 2024-12-16

## 2020-02-06 RX ORDER — TRAMADOL HYDROCHLORIDE 50 MG/1
50-100 TABLET ORAL EVERY 4 HOURS PRN
Qty: 240 TABLET | Refills: 0 | Status: SHIPPED | OUTPATIENT
Start: 2020-02-19 | End: 2020-05-12 | Stop reason: WASHOUT

## 2020-02-06 RX ORDER — TRAMADOL HYDROCHLORIDE 50 MG/1
50-100 TABLET ORAL EVERY 4 HOURS PRN
Qty: 240 TABLET | Refills: 0 | Status: SHIPPED | OUTPATIENT
Start: 2020-03-20 | End: 2020-05-12 | Stop reason: WASHOUT

## 2020-02-06 RX ORDER — TRAMADOL HYDROCHLORIDE 50 MG/1
50-100 TABLET ORAL EVERY 4 HOURS PRN
Qty: 240 TABLET | Refills: 0 | Status: SHIPPED | OUTPATIENT
Start: 2020-04-19 | End: 2020-08-13 | Stop reason: WASHOUT

## 2020-02-06 RX ORDER — ATORVASTATIN CALCIUM 40 MG/1
40 TABLET, FILM COATED ORAL NIGHTLY
COMMUNITY
Start: 2020-01-20

## 2020-02-06 RX ORDER — PAROXETINE HYDROCHLORIDE 20 MG/1
20 TABLET, FILM COATED ORAL EVERY MORNING
COMMUNITY
Start: 2020-01-20 | End: 2020-05-12 | Stop reason: DRUGHIGH

## 2020-02-06 ASSESSMENT — PAIN DESCRIPTION - DESCRIPTORS: DESCRIPTORS: SHARP;RADIATING

## 2020-02-06 ASSESSMENT — PAIN - FUNCTIONAL ASSESSMENT: PAIN_FUNCTIONAL_ASSESSMENT: 0-10

## 2020-02-06 ASSESSMENT — ENCOUNTER SYMPTOMS
JOINT SWELLING: 0
ARTHRALGIAS: 0
NECK PAIN: 0
MYALGIAS: 0
SLEEP DISTURBANCE: 0
ABDOMINAL DISTENTION: 1
BACK PAIN: 0
CONSTIPATION: 0
DIARRHEA: 0
NAUSEA: 1
ABDOMINAL PAIN: 1

## 2020-02-06 NOTE — PROGRESS NOTES
Subjective     Patient ID: Ivonne Giron is a 49 y.o. female.    HPI  Here for med review.  She was supposed to try bentyl. She has bentyl now and not sure it helps, and it makes her sleepy.  She was told she has osteoporosis too, she wondered if it was from the injection she had here in her steroid.  She is taking some meds for this weekly now.    She still has her abd episodes and nothing more to add about it.         Pain Score: 5 - Moderate pain(worst 10; beset 5)  Pain Type: Chronic pain  Pain Location: Abdomen  Pain Descriptors: Sharp, Radiating  Pain Frequency: Constant/continuous  Pain Onset: Awakened from sleep  Clinical Progression: Not changed    Past Medical History:   Diagnosis Date   • Abdominal pain, chronic, generalized    • Accelerated essential hypertension    • Chronic pain disorder    • Intercostal neuropathic pain    • Migraine    • Pain     surgical sight pain since gallbladder surgery        Past Surgical History:   Procedure Laterality Date   • CHOLECYSTECTOMY           Review of Systems   Gastrointestinal: Positive for abdominal distention (With abdominal pain), abdominal pain and nausea (With abdominal pain). Negative for constipation and diarrhea.   Musculoskeletal: Negative for arthralgias, back pain, joint swelling, myalgias and neck pain.   Psychiatric/Behavioral: Negative for sleep disturbance.       Objective   There were no vitals taken for this visit.       Current Outpatient Medications:   •  potassium chloride (KLOR-CON M20) 20 mEq CR tablet, Take 20 mEq by mouth daily, Disp: , Rfl: 1  •  M- Plus 27 mg iron- 1 mg tablet, Take 1 tablet by mouth daily, Disp: , Rfl:   •  PARoxetine (PAXIL) 20 mg tablet, Take 20 mg by mouth every morning, Disp: , Rfl:   •  omeprazole (PriLOSEC) 20 mg capsule, Take 20 mg by mouth daily, Disp: , Rfl:   •  cetirizine (ZyrTEC) 10 mg tablet, Take 10 mg by mouth as needed, Disp: , Rfl:   •  atorvastatin (LIPITOR) 40 mg tablet, Take 40 mg by mouth  nightly, Disp: , Rfl:   •  traMADol (ULTRAM 50) 50 mg tablet, Take 2 tablets (100 mg total) by mouth every 6 (six) hours as needed for pain scale 8-10/10 Max Daily Amount: 8 tablets, Disp: 240 tablet, Rfl: 0  •  gabapentin (NEURONTIN) 300 mg capsule, TAKE 1 CAPSULE BY MOUTH THREE TIMES DAILY, Disp: 90 capsule, Rfl: 3  •  butalbital-acetaminophen-caff (FIORICET, ESGIC) -40 mg, Take 1 tablet by mouth every 4 (four) hours as needed.  , Disp: , Rfl:   •  ibuprofen (MOTRIN IB) 200 mg tablet, Take 200 mg by mouth as needed.  , Disp: , Rfl:   •  prenatal 25-iron fum-folic-dha (PRENATAL-1) -200 mg-mcg-mg capsule, Take 1 capsule by mouth daily., Disp: , Rfl:   •  calcium carbonate-vitamin D3 (CALCIUM 500 WITH D) 500 mg(1,250mg) -400 unit tablet, Take 1 tablet by mouth daily.  , Disp: , Rfl:   •  hydrochlorothiazide (HYDRODIURIL) 25 mg tablet, Take 25 mg by mouth daily.  , Disp: , Rfl:   •  losartan (COZAAR) 100 mg tablet, Take by mouth daily.  , Disp: , Rfl:   •  [START ON 2/19/2020] traMADoL (ULTRAM 50) 50 mg tablet, Take 1-2 tablets ( mg total) by mouth every 4 (four) hours as needed for pain scale 8-10/10 Max Daily Amount: 8 tablets, Disp: 240 tablet, Rfl: 0  •  [START ON 3/20/2020] traMADoL (ULTRAM 50) 50 mg tablet, Take 1-2 tablets ( mg total) by mouth every 4 (four) hours as needed for pain scale 8-10/10 Max Daily Amount: 8 tablets, Disp: 240 tablet, Rfl: 0  •  [START ON 4/19/2020] traMADoL (ULTRAM 50) 50 mg tablet, Take 1-2 tablets ( mg total) by mouth every 4 (four) hours as needed for pain scale 8-10/10 Max Daily Amount: 8 tablets, Disp: 240 tablet, Rfl: 0  •  baclofen (LIORESAL) 10 mg tablet, Take 1 tablet (10 mg total) by mouth 3 (three) times a day as needed for muscle spasms., Disp: 90 tablet, Rfl: 0    Imaging:  Dxa Bone Density    Result Date: 1/25/2020  Narrative: Patient name: PK LEONARDO Sex: Female     Height: 66.0 in     Weight: 251.0 lbs  Indication for  Exam:Medication monitoring encounter; Monitoring of bone mineral as patient has been long use of DepoProvera. Medications: Multivitamins. Exam Performed: A Bone Mineral study of the left hip and lumbar spine on a Hologic Discovery C on 01/24/2020. Technical quality: Satisfactory Results:      Region                  BMD (g/cm2) T-Score          Z-Score Lumbar Spine (L1-L4) 0.734                -2.8 Femoral Neck              0.547                -2.7 Total Hip                      0.683                -2.1 Assessment: The patient has Osteoporosis bone mineral density using the WHO (World Health Organization) criteria. FRAX WHO Fracture Risk Assessment FRAX Not reported because: Some T-score for Spine Total or Hip Total or Femoral Neck at or below -2.5.     Impression: IMPRESSION: Low bone mineral density within Osteoporotic range National Osteoporosis Foundation recommendations for treatment:      1. BMD T-scores below -2.0 by central DEXA with no risk factors      2. BMD T-scores below -1.5 central DEXA with one or more of the following risk factors:           a. First degree relative with a hip fracture           b. Personal history of prior fragility fracture           c. Body weight less than 125 pounds           d. Smoking history Follow up: 1 year, if on treatment. National Osteoporosis Foundation Recommendations for Bone Health:      1. Adults over the age of 50: daily intake of 1200 mg of calcium and 800-1000 IU of Vitamin D3.      2. Engage in daily weight-bearing and muscle strengthening exercise      3. Avoid smoking and excess alcohol      4. Fall prevention         Physical Exam  Vitals signs and nursing note reviewed.   Constitutional:       General: She is not in acute distress.     Appearance: She is well-developed.   HENT:      Head: Normocephalic.   Eyes:      Conjunctiva/sclera: Conjunctivae normal.   Neck:      Musculoskeletal: Neck supple.   Cardiovascular:      Rate and Rhythm: Normal rate.    Pulmonary:      Effort: Pulmonary effort is normal. No respiratory distress.   Abdominal:      General: There is no distension.      Tenderness: There is abdominal tenderness.   Musculoskeletal: Normal range of motion.         General: No tenderness or deformity.      Comments:      Skin:     General: Skin is warm and dry.      Findings: No rash.   Neurological:      Mental Status: She is alert and oriented to person, place, and time.   Psychiatric:         Behavior: Behavior normal.         Thought Content: Thought content normal.         Judgment: Judgment normal.         Assessment/Plan     1. Abdominal pain, chronic, right upper quadrant    2. Long-term current use of opiate analgesic    3. History of seizure disorder          PDMP  reviewed.  MME =40.  Narcandone. Aberrant behavior = no. Affect/Mood = about the same. ADL = independent. Analgesia = adequate. Adverse Side Effects = no. Goals have been addressed and discussed with this patient.  The patient was advised that if at any time they feel oversedated to stop the medication and seek emergency care.      She still has her abd pain and sometimes doesn't need a lot of her pain meds but when an episode she takes 2 tramadol.  She thinks she might get to 8 per day and will fill later dates if she has extra. I will watch her use and maybe we can decrease it somewhat , she has a seizure disorder and will need to watch this.    Meri makes her tired and she will take it when her abd episodes happen and at times she takes it when she goes to bed.  She talked to the pharmacist about taking this and how to take it.     Meds done and f/u 3 months.     DISCUSSION  Today's plan was a combined effort between the patient and myself. The patient understood the plan, verbally consented, and agreed to participate. An After Visit Summary with special instructions/information regarding today's office visit was given to the patient (see patient instructions).     The  diagnostic assessment has been reviewed. Patient and/or patient's surrogate has expressed a good understanding of the assessment and recommendations from today's visit. There are no apparent barriers to understanding this information. Patient’s questions were addressed.  I have reviewed the patients current medications using all immediate resources available.     Patient has been advised to contact this office or the answering service with questions or concerns that may arise. Patient has been advised to follow up with Primary Care Provider for general medical needs.     A total of 15 minutes was required for this visit. Greater than 50% of this time was spent in direct face to face communication with the patient and/or surrogate in order to provide counseling regarding meds and bentyl and reviewing her old records from her PCP in [place] .    Dragon voice recognition program was used to aid in this documentation which might generate inaccuracies despite efforts made to avoid them.  Please take it into context and contact the provider with any questions.    Roberto Elliott, CNP

## 2020-03-06 ENCOUNTER — MEDICATION ACCESS (OUTPATIENT)
Dept: NEUROLOGY | Facility: CLINIC | Age: 50
End: 2020-03-06

## 2020-03-06 ENCOUNTER — OFFICE VISIT (OUTPATIENT)
Dept: NEUROLOGY | Facility: CLINIC | Age: 50
End: 2020-03-06
Payer: COMMERCIAL

## 2020-03-06 VITALS
DIASTOLIC BLOOD PRESSURE: 82 MMHG | HEART RATE: 85 BPM | BODY MASS INDEX: 39.86 KG/M2 | HEIGHT: 66 IN | OXYGEN SATURATION: 94 % | SYSTOLIC BLOOD PRESSURE: 126 MMHG | WEIGHT: 248 LBS

## 2020-03-06 DIAGNOSIS — G43.019 INTRACTABLE MIGRAINE WITHOUT AURA AND WITHOUT STATUS MIGRAINOSUS: Primary | ICD-10-CM

## 2020-03-06 PROCEDURE — 99214 OFFICE O/P EST MOD 30 MIN: CPT | Performed by: NURSE PRACTITIONER

## 2020-03-06 RX ORDER — GALCANEZUMAB 120 MG/ML
240 INJECTION, SOLUTION SUBCUTANEOUS ONCE
Qty: 2 PEN | Refills: 0 | OUTPATIENT
Start: 2020-03-06 | End: 2020-03-06

## 2020-03-06 RX ORDER — GALCANEZUMAB 120 MG/ML
120 INJECTION, SOLUTION SUBCUTANEOUS
Qty: 1 PEN | Refills: 11 | Status: SHIPPED | OUTPATIENT
Start: 2020-03-06 | End: 2020-03-10 | Stop reason: SDUPTHER

## 2020-03-06 ASSESSMENT — ENCOUNTER SYMPTOMS
VOMITING: 0
COUGH: 0
SEIZURES: 0
DYSURIA: 0
EYE PAIN: 0
CHILLS: 0
SHORTNESS OF BREATH: 0
SORE THROAT: 0
FEVER: 0
HEMATURIA: 0
COLOR CHANGE: 0
PALPITATIONS: 0
ABDOMINAL PAIN: 0
ARTHRALGIAS: 0
BACK PAIN: 0

## 2020-03-06 ASSESSMENT — PAIN SCALES - GENERAL: PAINLEVEL: 2

## 2020-03-06 NOTE — PROGRESS NOTES
Headache Exam    03/06/20  11:16 AM  Chief Complaint   Patient presents with   • Migraine       HPI:  Ivonne Giron is a 49 y.o. female who presents to the clinic for follow up of migraine.      Patient has greater than 15 days/month with headache.  Today patient notes that she is having the headaches on almost a daily basis. They are especially frontal area they are 7-10 out of 10 in severity. The pain is throbbing and stabbing. The pain is so bad she gets scared. It will hurt her so much that she hears ringing in her head. She can not wear her hair in a ponytail as it hurts. The headaches do last all day long. She is positive for sensitivity to light and sound. She is nauseated with these episodes. They make her throw up. She notes that she had the worst headache after her last botox treatment, but the headache went away in about two hours.     At her last  appointment Dr. Man put her on propanolol, she notes  side effects however the medication did not appear to help her. Her last migraine was 2 days ago at that time she took Motrin and stayed in a dark quiet place. She is no longer taking propranolol. She will take 2 fioricet a week.     Medications used: Botox, propanolol losartan, tramadol, Fioricet, gabapentin, topamax (she has had kidney stones), amitriptyline, hives    She does not over use OCTs.    Histories:    Medical:    Past Medical History:   Diagnosis Date   • Abdominal pain, chronic, generalized    • Accelerated essential hypertension    • Chronic pain disorder    • Intercostal neuropathic pain    • Migraine    • Pain     surgical sight pain since gallbladder surgery         Surgical:    Past Surgical History:   Procedure Laterality Date   • CHOLECYSTECTOMY           Family:    Family History   Problem Relation Age of Onset   • Arthritis Mother    • Hypertension Father    • Hypertension Other    • Tuberculosis Other    • Arthritis Other    • Diabetes Other    • Heart disease Other    •  Migraines Other    • Migraines Mother's Sister          Social:    Social History     Socioeconomic History   • Marital status: Single     Spouse name: Not on file   • Number of children: Not on file   • Years of education: Not on file   • Highest education level: Not on file   Occupational History   • Not on file   Social Needs   • Financial resource strain: Not on file   • Food insecurity     Worry: Not on file     Inability: Not on file   • Transportation needs     Medical: Not on file     Non-medical: Not on file   Tobacco Use   • Smoking status: Never Smoker   • Smokeless tobacco: Never Used   Substance and Sexual Activity   • Alcohol use: No   • Drug use: No   • Sexual activity: Defer   Lifestyle   • Physical activity     Days per week: Not on file     Minutes per session: Not on file   • Stress: Not on file   Relationships   • Social connections     Talks on phone: Not on file     Gets together: Not on file     Attends Mandaen service: Not on file     Active member of club or organization: Not on file     Attends meetings of clubs or organizations: Not on file     Relationship status: Not on file   • Intimate partner violence     Fear of current or ex partner: Not on file     Emotionally abused: Not on file     Physically abused: Not on file     Forced sexual activity: Not on file   Other Topics Concern   • Not on file   Social History Narrative   • Not on file       Allergies:    Allergies   Allergen Reactions   • Indomethacin      HIVES   • Prednisone      N/V   • Sulfa (Sulfonamide Antibiotics)      HIVES       Current Medications:    Current Outpatient Medications   Medication Sig Dispense Refill   • M-[place] Plus 27 mg iron- 1 mg tablet Take 1 tablet by mouth daily     • PARoxetine (PAXIL) 20 mg tablet Take 20 mg by mouth every morning     • omeprazole (PriLOSEC) 20 mg capsule Take 20 mg by mouth daily     • cetirizine (ZyrTEC) 10 mg tablet Take 10 mg by mouth as needed     • atorvastatin (LIPITOR) 40  mg tablet Take 40 mg by mouth nightly     • traMADoL (ULTRAM 50) 50 mg tablet Take 1-2 tablets ( mg total) by mouth every 4 (four) hours as needed for pain scale 8-10/10 Max Daily Amount: 8 tablets 240 tablet 0   • gabapentin (NEURONTIN) 300 mg capsule TAKE 1 CAPSULE BY MOUTH THREE TIMES DAILY 90 capsule 3   • butalbital-acetaminophen-caff (FIORICET, ESGIC) -40 mg Take 1 tablet by mouth every 4 (four) hours as needed Can use only two tablets a week.      • prenatal 25-iron fum-folic-dha (PRENATAL-1) -200 mg-mcg-mg capsule Take 1 capsule by mouth daily.     • calcium carbonate-vitamin D3 (CALCIUM 500 WITH D) 500 mg(1,250mg) -400 unit tablet Take 1 tablet by mouth daily.       • losartan (COZAAR) 100 mg tablet Take by mouth daily.       • galcanezumab-gnlm (Emgality Pen) 120 mg/mL pen Inject 120 mg under the skin every 28 (twenty-eight) days 1 pen 11   • galcanezumab-gnlm (Emgality Pen) 120 mg/mL pen Inject 240 mg under the skin once for 1 dose begin maintenance dosing 1 month later. 2 pen 0   • potassium chloride (KLOR-CON M20) 20 mEq CR tablet Take 20 mEq by mouth daily  1   • [START ON 3/20/2020] traMADoL (ULTRAM 50) 50 mg tablet Take 1-2 tablets ( mg total) by mouth every 4 (four) hours as needed for pain scale 8-10/10 Max Daily Amount: 8 tablets 240 tablet 0   • [START ON 4/19/2020] traMADoL (ULTRAM 50) 50 mg tablet Take 1-2 tablets ( mg total) by mouth every 4 (four) hours as needed for pain scale 8-10/10 Max Daily Amount: 8 tablets 240 tablet 0   • traMADol (ULTRAM 50) 50 mg tablet Take 2 tablets (100 mg total) by mouth every 6 (six) hours as needed for pain scale 8-10/10 Max Daily Amount: 8 tablets 240 tablet 0   • ibuprofen (MOTRIN IB) 200 mg tablet Take 200 mg by mouth as needed.         No current facility-administered medications for this visit.        Home Medications:    Prior to Admission medications    Medication Sig Start Date End Date Taking? Authorizing Provider    potassium chloride (KLOR-CON M20) 20 mEq CR tablet Take 20 mEq by mouth daily 19   Historical Provider, MD   M- Plus 27 mg iron- 1 mg tablet Take 1 tablet by mouth daily 20   Historical Provider, MD   PARoxetine (PAXIL) 20 mg tablet Take 20 mg by mouth every morning 20   Historical Provider, MD   omeprazole (PriLOSEC) 20 mg capsule Take 20 mg by mouth daily 20   Historical Provider, MD   cetirizine (ZyrTEC) 10 mg tablet Take 10 mg by mouth as needed 20   Historical Provider, MD   atorvastatin (LIPITOR) 40 mg tablet Take 40 mg by mouth nightly 20   Historical Provider, MD   traMADoL (ULTRAM 50) 50 mg tablet Take 1-2 tablets ( mg total) by mouth every 4 (four) hours as needed for pain scale 8-10/10 Max Daily Amount: 8 tablets 2/19/20 3/20/20  Roberto Elliott CNP   traMADoL (ULTRAM 50) 50 mg tablet Take 1-2 tablets ( mg total) by mouth every 4 (four) hours as needed for pain scale 8-10/10 Max Daily Amount: 8 tablets 3/20/20 4/19/20  Roberto Elliott CNP   traMADoL (ULTRAM 50) 50 mg tablet Take 1-2 tablets ( mg total) by mouth every 4 (four) hours as needed for pain scale 8-10/10 Max Daily Amount: 8 tablets 20  Roberto Elliott CNP   traMADol (ULTRAM 50) 50 mg tablet Take 2 tablets (100 mg total) by mouth every 6 (six) hours as needed for pain scale 8-10/10 Max Daily Amount: 8 tablets 20  Roberto Elliott CNP   gabapentin (NEURONTIN) 300 mg capsule TAKE 1 CAPSULE BY MOUTH THREE TIMES DAILY 1/3/20   Roberto Elliott CNP   baclofen (LIORESAL) 10 mg tablet Take 1 tablet (10 mg total) by mouth 3 (three) times a day as needed for muscle spasms. 7/3/18 8/2/18  Roberto Elliott CNP   butalbital-acetaminophen-caff (FIORICET, ESGIC) -40 mg Take 1 tablet by mouth every 4 (four) hours as needed.      Historical Provider, MD   ibuprofen (MOTRIN IB) 200 mg tablet Take 200 mg by mouth as needed.      Historical Provider, MD   prenatal 25-iron  fum-folic-dha (PRENATAL-1) -200 mg-mcg-mg capsule Take 1 capsule by mouth daily.    Historical Provider, MD   calcium carbonate-vitamin D3 (CALCIUM 500 WITH D) 500 mg(1,250mg) -400 unit tablet Take 1 tablet by mouth daily.      Historical Provider, MD   hydrochlorothiazide (HYDRODIURIL) 25 mg tablet Take 25 mg by mouth daily.      Historical Provider, MD   losartan (COZAAR) 100 mg tablet Take by mouth daily.   1/17/13   Conversion Provider       Review of Systems:  Review of Systems   Constitutional: Negative for chills and fever.   HENT: Negative for ear pain and sore throat.    Eyes: Negative for pain and visual disturbance.   Respiratory: Negative for cough and shortness of breath.    Cardiovascular: Negative for chest pain and palpitations.   Gastrointestinal: Negative for abdominal pain and vomiting.   Genitourinary: Negative for dysuria and hematuria.   Musculoskeletal: Negative for arthralgias and back pain.   Skin: Negative for color change and rash.   Neurological: Negative for seizures and syncope.   All other systems reviewed and are negative.      OBJECTIVE    Labs:    A1c: No results found for: HGBA1C  Basic:   Lab Results   Component Value Date     05/26/2018    K 2.9 (LL) 05/26/2018     05/26/2018    CO2 21 05/26/2018    BUN 9 05/26/2018    CREATININE 0.7 05/26/2018    GLUCOSE 108 (H) 05/26/2018    CALCIUM 8.4 (L) 05/26/2018     CBC with Platelet:    Lab Results   Component Value Date    WBC 12.2 (H) 05/26/2018    HGB 13.7 05/26/2018    HCT 39.0 05/26/2018     05/26/2018    RBC 4.67 05/26/2018    MCV 83.5 05/26/2018    MCH 29.2 05/26/2018    MCHC 35.0 05/26/2018    RDW 13.6 05/26/2018    MPV 6.6 (L) 05/26/2018     Comp:   Lab Results   Component Value Date     05/26/2018    K 2.9 (LL) 05/26/2018     05/26/2018    CO2 21 05/26/2018    BUN 9 05/26/2018    CREATININE 0.7 05/26/2018    GLUCOSE 108 (H) 05/26/2018    CALCIUM 8.4 (L) 05/26/2018    PROT 7.6 05/26/2018     "ALBUMIN 3.6 05/26/2018    AST 65 (H) 05/26/2018    ALT 82 (H) 05/26/2018    ALKPHOS 189 (H) 05/26/2018    BILITOT 0.66 05/26/2018     Coags:   Lab Results   Component Value Date    PT 14.9 (H) 05/26/2018    APTT 28 05/26/2018    INR 1.18 (H) 05/26/2018     Lipid: No results found for: CHOL, TRIG, HDLC, LDLC, HDL, LDL  TSH: No results found for: TSH    Vital Signs:  /82 (BP Location: Right arm, Patient Position: Sitting, Cuff Size: Large Adult)   Pulse 85   Ht 1.676 m (5' 6\")   Wt 112 kg (248 lb)   SpO2 94%   BMI 40.03 kg/m²   Physical Exam  Constitutional:       Appearance: She is well-developed.   HENT:      Head: Normocephalic.   Eyes:      Pupils: Pupils are equal, round, and reactive to light.   Neck:      Musculoskeletal: Normal range of motion.   Cardiovascular:      Rate and Rhythm: Normal rate.   Pulmonary:      Breath sounds: Normal breath sounds.       Neurological exam:   General:  Awake, fully participates, sitting upright, engaged.  MS:  LOC is alert, cognitive function grossly intact  CN I: not tested.   CN II: Pupils equal and reactive. 2mm, reactive to light. Visual fields are full to confrontation.  Unable to see fundus bilaterally.   CN III, IV,VI: EMOI. No nystagmus. Normal smooth pursuit and saccade initiation.   CN V: facial sensation is intact to light touch in V1, V2, and V3 distribution.  CN VII: Muscles of facial expression are strong and symmetric.   CN VIII: Hearing is grossly intact.  CN IX,X: Palate elevates symmetrically.   CN XI: sternocleidomastoid and trapezius are normal.   CN XII: tongue protrudes in midline.   Motor: L-R             Deltoid 5/5             Elbow Flexion/Extension 5/5-5/5,  Wrist Flexion/Extension 5/5-5/5,               strength 5/5-5/5 Hip, knee flexion/extension 5/5-5/5, Plantar-, dorsi - flexion 5/5-5/5  Sensory:  No sensory loss to light touch, pinprick, vibration in all UE and LE Dermatomes.    Reflex:   Bicep 2/4 bilateral              " Brachioradialis 2/4 bilateral              Patellar 2/4 bilateral, Achilles 2/4,  Plantar response downgoing bilaterally    Coordination:  Finger-to-nose and heel-to-shin accurate with no ataxia, no dysmetria.    Gait:  symmetric, not wide-based    No results found.  Assessment:  Problem List Items Addressed This Visit        Nervous    Intractable migraine without aura and without status migrainosus - Primary    Relevant Medications    galcanezumab-gnlm (Emgality Pen) 120 mg/mL pen    galcanezumab-gnlm (Emgality Pen) 120 mg/mL pen    Other Relevant Orders    MR brain with and without contrast        Orders Placed This Encounter   Procedures   • MR brain with and without contrast     Standing Status:   Future     Standing Expiration Date:   3/6/2021     Order Specific Question:   Is the patient pregnant?     Answer:   Unknown     Order Specific Question:   Has the patient ever failed an MRI Exam due to claustrophobia or is the patient unable to lie still for an extended period of time?     Answer:   No     Order Specific Question:   Does the patient have a pacemaker/defibrillator?     Answer:   No     Discussion:   49 year old female presents   today for follow up of migraine headaches. She was last seen in the clinic by Dr. Man. At that time propanolol was prescribed for preventive migraine control. She states no adverse effects however did not notice a difference in migraine pattern. It was noted that if there was not response to propanolol then Periactin should be trial per Dr Man. I will also order her to be seen by Dr Grimm for Xeomin treatment.    She has trialed multiple prophylactic medication without benefit with her migraines today I am going to provide Emgality a CGRP that she can give herself once a month.  I will also order a brain MRI to rule out any abnormalities.  I will have her follow-up in 3 month or sooner if needed    Plan:  1.  Emgality once a month injection.    2.  Brain MRI.    3.   Follow-up in 3    Patient is encouraged to keep a headache log including type of headache, frequency of headache, and any triggers.    Patient encouraged to get plenty of sleep.    She  was counseled about maintaining proper body weight, exercise, and healthy lifestyle.    Patient is to stay active both physically and mentally.     She will follow up in this office in 3 months, sooner if needed. Patients questions were addressed and answered.     The plan for this visit has been reviewed.  The patient and/or the patient's surrogate has expressed a good understanding of the assessment and recommendation from today's visit.  Verbal and/or written instructions have been included.  No apparent barriers to understanding this information.  Patient's questions were addressed.  A total of 25 minutes was required for this visit.  Greater than 50% of this time was spent in direct face-to-face communication with the patient and/or surrogate caregiver to provide counseling and coordination of care for their headaches.     A voice recognition program was used to aid in documentation of the record. Sometimes words are not printed exactly as they were spoken. While efforts were made to carefully edit and correct any inaccuracies, some may be present; please take these into context. Please contact the provider if areas are identified.     Mary Jo Fowler, CNP

## 2020-03-06 NOTE — PROGRESS NOTES
03.06.2020-return call from Ivonne, she prefers [facility] [phone]    Rx sent to [facility], but pt told me she prefers to fill in [facility].    LM for pt to call back, will need to clarify her pharmacy preference.

## 2020-03-10 ENCOUNTER — SPECIALTY PHARMACY (OUTPATIENT)
Dept: PHARMACY | Facility: HOSPITAL | Age: 50
End: 2020-03-10

## 2020-03-10 DIAGNOSIS — G43.019 INTRACTABLE MIGRAINE WITHOUT AURA AND WITHOUT STATUS MIGRAINOSUS: ICD-10-CM

## 2020-03-10 DIAGNOSIS — G43.019 INTRACTABLE MIGRAINE WITHOUT AURA AND WITHOUT STATUS MIGRAINOSUS: Primary | ICD-10-CM

## 2020-03-10 RX ORDER — GALCANEZUMAB 120 MG/ML
240 INJECTION, SOLUTION SUBCUTANEOUS ONCE
Qty: 2 PEN | Refills: 0 | Status: SHIPPED | OUTPATIENT
Start: 2020-03-10 | End: 2020-03-10

## 2020-03-10 RX ORDER — GALCANEZUMAB 120 MG/ML
120 INJECTION, SOLUTION SUBCUTANEOUS
Qty: 1 PEN | Refills: 11 | Status: SHIPPED | OUTPATIENT
Start: 2020-03-10 | End: 2020-06-02 | Stop reason: SDUPTHER

## 2020-03-11 ENCOUNTER — SPECIALTY PHARMACY (OUTPATIENT)
Dept: PHARMACY | Facility: HOSPITAL | Age: 50
End: 2020-03-11

## 2020-03-11 NOTE — PROGRESS NOTES
Prescription/Therapy Management Communication    Medication name: Emgality  Prescribed by: MICHELE Fowler    Patient copay prior to copay assistance:  $0  Patient actually paid (out-of-pocket): $0    Patient will start medication on:  03/10/2020    Patient enrolled in [facility] Therapy management program:   ___x_ Yes    ____ No (pt opted out)        ____ Other:    Patient will receive:            Medication information/education   _x__ Verbal   _x____ Written  Disease state information/education        _____ Verbal         __x___ Written     Injection Training (when applicable)   __x___ Yes    _____ No  Sharps container (when applicable)            _____ Yes          ___x__ No (has already)    Other:     __x__ alcohol wipes        ____ pill boxes     ____ medication card       ____ pen needles  ____ starter package ()              ____ training pen/device      Additional Notes: Ivonne presented to [facility] for initial counseling on Emgality. All counseling points were discussed. Written medication information was also given to Ivonne. Injection training was done using the Emgality training pen. Ivonne did a good job of demonstration. Written injection instructions were also given to Ivonne.She had no further questions for the pharmacist at this time.    If you have any questions please call [facility] at [phone]

## 2020-03-12 NOTE — PROGRESS NOTES
Ivonne presented to [facility] for initial education and injection of Emgality. Reviewed services offered by [facility]: on-call pharmacist, proactive refill program, and TM program- Ivonne wishes to enroll. Educated on medication by [facility] pharmacist. In regards to administration of medication, verbalizes understanding and repeated all instructions. Ivonne was given printed teaching materials on sharps disposal and on subcutaneous injections. She also displayed satisfactory return demonstration on training device. Ivonne administered initial doses to each thigh, outer aspect using clean aseptic technique. No issue or difficulty with injections. No signs of side effects or injection site reaction at the time patient exited the pharmacy. Baseline information including PROMIS-10 and ARMS-7 evaluations to be done via phone at post start call. Ivonne will be contacted by TM nurse within the next 7 days to assess injection site, any side effects, and address any questions or concerns. Patient will be activated in [place] after post start call.

## 2020-03-13 ENCOUNTER — SPECIALTY PHARMACY (OUTPATIENT)
Dept: PHARMACY | Facility: HOSPITAL | Age: 50
End: 2020-03-13

## 2020-03-13 NOTE — PROGRESS NOTES
Post start call placed to Ivonne. She reports initial injections went well, denies any injection site reactions, denies any med side effects. No questions or concerns. Next TM call mid-April.

## 2020-04-15 ENCOUNTER — TELEPHONE (OUTPATIENT)
Dept: PAIN MEDICINE | Facility: HOSPITAL | Age: 50
End: 2020-04-15

## 2020-04-15 NOTE — TELEPHONE ENCOUNTER
Patient notified that Roberto is okay with her filling her Tramadol today instead of 4/19/20. Pharmacy notified of okay to fill early. [place] brother to an appointment in [place].

## 2020-04-15 NOTE — TELEPHONE ENCOUNTER
Patient will be coming to [place] today from [place]. [place]. She is wondering if her Tramadol can be picked up today at her pharmacy here in [place]. She is due to fill 4/19/20.

## 2020-04-17 ENCOUNTER — SPECIALTY PHARMACY (OUTPATIENT)
Dept: PHARMACY | Facility: HOSPITAL | Age: 50
End: 2020-04-17

## 2020-04-17 NOTE — PROGRESS NOTES
Spoke with Ivonne to follow up on her Emgality injectable therapy for treatment of her migraines. She denies any injection site irritation/reaction or any other side effects. She reports having had 2 migraines in the past 4 weeks that were at a pain level of 10/10 and lasted under 20-35 minutes. She is unsure of any triggers, discussed the use of a migraine diary. She rates her baseline pain at 5/10 and global wellness at 10/10 (0 being optimal). Will continue to follow up with monthly therapy management calls. Ivonne denies having any questions or concerns at this time. Will schedule next call in May.

## 2020-04-21 DIAGNOSIS — R90.89 ABNORMAL FINDING ON MRI OF BRAIN: ICD-10-CM

## 2020-04-21 DIAGNOSIS — G43.019 INTRACTABLE MIGRAINE WITHOUT AURA AND WITHOUT STATUS MIGRAINOSUS: Primary | ICD-10-CM

## 2020-04-21 DIAGNOSIS — G54.8 INTERCOSTAL NEUROPATHIC PAIN: ICD-10-CM

## 2020-04-22 DIAGNOSIS — R10.10 PAIN OF UPPER ABDOMEN: ICD-10-CM

## 2020-04-22 RX ORDER — GABAPENTIN 300 MG/1
CAPSULE ORAL
Qty: 90 CAPSULE | Refills: 3 | Status: SHIPPED | OUTPATIENT
Start: 2020-04-22 | End: 2020-08-13

## 2020-05-05 ENCOUNTER — TELEPHONE (OUTPATIENT)
Dept: PAIN MEDICINE | Facility: HOSPITAL | Age: 50
End: 2020-05-05

## 2020-05-11 NOTE — PROGRESS NOTES
"Subjective   Per discussion with Roberto Elliott, CNP, Ivonne, has verbally consented to be treated via a telephone based visit: Yes. A total of 15 minutes were required for this telephone based visit.   Patient Location: Home  Provider Location: Clinic  Technology used by Provider: Phone    HPI  Ivonne Giron is a 49 y.o. female who presents for medication review.  We wanted her to try Bentyl but she said it made her sleepy. She takes her tramadol throughout the day and at times it wakes her up at night and \"I am drenched when it happens\".    She saw neurology for her headaches.  She is trying Emgality and had an MRI of her brain. She is having some buzzing in her head and will let them know.     She said her community is on lock down until tomorrow afternoon.  She is doing the same.     Pain Type: (P) Chronic pain    HPI    The following have been reviewed and updated as appropriate in this visit:    Allergies   Allergen Reactions   • Indomethacin      HIVES   • Prednisone      N/V   • Sulfa (Sulfonamide Antibiotics)      HIVES     Current Outpatient Medications   Medication Sig Dispense Refill   • PARoxetine (PAXIL) 40 mg tablet Take 40 mg by mouth every morning     • alendronate (FOSAMAX) 70 mg tablet Take 70 mg by mouth once a week     • gabapentin (NEURONTIN) 300 mg capsule TAKE 1 CAPSULE BY MOUTH THREE TIMES DAILY 90 capsule 3   • galcanezumab-gnlm (Emgality Pen) 120 mg/mL pen Inject 120 mg under the skin every 28 (twenty-eight) days 1 pen 11   • potassium chloride (KLOR-CON M20) 20 mEq CR tablet Take 20 mEq by mouth daily  1   • M- Plus 27 mg iron- 1 mg tablet Take 1 tablet by mouth daily     • omeprazole (PriLOSEC) 20 mg capsule Take 20 mg by mouth daily     • cetirizine (ZyrTEC) 10 mg tablet Take 10 mg by mouth as needed     • atorvastatin (LIPITOR) 40 mg tablet Take 40 mg by mouth nightly     • traMADoL (ULTRAM 50) 50 mg tablet Take 1-2 tablets ( mg total) by mouth every 4 (four) hours as " needed for pain scale 8-10/10 Max Daily Amount: 8 tablets 240 tablet 0   • butalbital-acetaminophen-caff (FIORICET, ESGIC) -40 mg Take 1 tablet by mouth every 4 (four) hours as needed Can use only two tablets a week.      • ibuprofen (MOTRIN IB) 200 mg tablet Take 200 mg by mouth as needed.       • prenatal 25-iron fum-folic-dha (PRENATAL-1) -200 mg-mcg-mg capsule Take 1 capsule by mouth daily.     • calcium carbonate-vitamin D3 (CALCIUM 500 WITH D) 500 mg(1,250mg) -400 unit tablet Take 1 tablet by mouth daily.       • losartan (COZAAR) 100 mg tablet Take by mouth daily.         No current facility-administered medications for this visit.      Past Medical History:   Diagnosis Date   • Abdominal pain, chronic, generalized    • Accelerated essential hypertension    • Chronic pain disorder    • Intercostal neuropathic pain    • Migraine    • Pain     surgical sight pain since gallbladder surgery     Past Surgical History:   Procedure Laterality Date   • CHOLECYSTECTOMY       Family History   Problem Relation Age of Onset   • Arthritis Mother    • Hypertension Father    • Hypertension Other    • Tuberculosis Other    • Arthritis Other    • Diabetes Other    • Heart disease Other    • Migraines Other    • Migraines Mother's Sister      Social History     Occupational History   • Not on file   Tobacco Use   • Smoking status: Never Smoker   • Smokeless tobacco: Never Used   Substance and Sexual Activity   • Alcohol use: No   • Drug use: No   • Sexual activity: Defer   Social History Narrative   • Not on file       Review of Systems   Constitutional: Positive for diaphoresis (with attacks).   Gastrointestinal: Positive for abdominal pain and nausea. Negative for abdominal distention.   Neurological: Positive for headaches.       Objective   Physical Exam  Nursing note reviewed.   Neurological:      Mental Status: She is alert and oriented to person, place, and time.   Psychiatric:         Mood and Affect: Mood  normal.         Behavior: Behavior normal.         Thought Content: Thought content normal.         Judgment: Judgment normal.         Assessment/Plan   Diagnoses and all orders for this visit:    Abdominal pain, chronic, right upper quadrant    Intractable migraine without aura and without status migrainosus    Long-term current use of opiate analgesic         PDMP  reviewed.  MME =40.  Narcannot done. Aberrant behavior = no. Affect/Mood = about the same. ADL = independent. Analgesia = adequate. Adverse Side Effects = no. Goals have been addressed and discussed with this patient.  The patient was advised that if at any time they feel oversedated to stop the medication and seek emergency care.      We will continue her usual medications and I am not sure how many tramadol she takes daily as she says 2 am, around 1 then evening.  I told her if she goes over 8 per day then at high risk for seizure and that this is always our worry.  She says they continue to work.  I can discuss this again in the summer other modalities but she said ever since she has had an injection here she thinks that made her headaches worse.  She said she had headaches long time ago and they got better and now they are starting to get worse again and she is working with neurology.  She feels a lot of pressure in her head. She will let them be aware and continue on their meds.

## 2020-05-12 ENCOUNTER — TELEPHONE - BILLABLE (OUTPATIENT)
Dept: PAIN MEDICINE | Facility: HOSPITAL | Age: 50
End: 2020-05-12
Payer: COMMERCIAL

## 2020-05-12 ENCOUNTER — SPECIALTY PHARMACY (OUTPATIENT)
Dept: PHARMACY | Facility: HOSPITAL | Age: 50
End: 2020-05-12

## 2020-05-12 DIAGNOSIS — G43.019 INTRACTABLE MIGRAINE WITHOUT AURA AND WITHOUT STATUS MIGRAINOSUS: ICD-10-CM

## 2020-05-12 DIAGNOSIS — Z79.891 LONG-TERM CURRENT USE OF OPIATE ANALGESIC: ICD-10-CM

## 2020-05-12 DIAGNOSIS — G89.29 ABDOMINAL PAIN, CHRONIC, RIGHT UPPER QUADRANT: Primary | ICD-10-CM

## 2020-05-12 DIAGNOSIS — R10.11 ABDOMINAL PAIN, CHRONIC, RIGHT UPPER QUADRANT: Primary | ICD-10-CM

## 2020-05-12 RX ORDER — PAROXETINE HYDROCHLORIDE 40 MG/1
40 TABLET, FILM COATED ORAL EVERY MORNING
Status: ON HOLD | COMMUNITY
Start: 2020-05-05 | End: 2024-12-16

## 2020-05-12 RX ORDER — ALENDRONATE SODIUM 70 MG/1
70 TABLET ORAL WEEKLY
Status: ON HOLD | COMMUNITY
Start: 2020-04-20 | End: 2024-12-16

## 2020-05-12 RX ORDER — TRAMADOL HYDROCHLORIDE 50 MG/1
50-100 TABLET ORAL EVERY 4 HOURS PRN
Qty: 240 TABLET | Refills: 0 | Status: SHIPPED | OUTPATIENT
Start: 2020-07-18 | End: 2020-11-03 | Stop reason: WASHOUT

## 2020-05-12 RX ORDER — TRAMADOL HYDROCHLORIDE 50 MG/1
50-100 TABLET ORAL EVERY 4 HOURS PRN
Qty: 240 TABLET | Refills: 0 | Status: SHIPPED | OUTPATIENT
Start: 2020-05-19 | End: 2020-08-13 | Stop reason: WASHOUT

## 2020-05-12 RX ORDER — TRAMADOL HYDROCHLORIDE 50 MG/1
50-100 TABLET ORAL EVERY 4 HOURS PRN
Qty: 240 TABLET | Refills: 0 | Status: SHIPPED | OUTPATIENT
Start: 2020-06-18 | End: 2020-08-13 | Stop reason: WASHOUT

## 2020-05-12 ASSESSMENT — ENCOUNTER SYMPTOMS
DIAPHORESIS: 1
HEADACHES: 1
NAUSEA: 1
ABDOMINAL PAIN: 1
ABDOMINAL DISTENTION: 0

## 2020-05-12 ASSESSMENT — PAIN - FUNCTIONAL ASSESSMENT: PAIN_FUNCTIONAL_ASSESSMENT: 0-10

## 2020-05-12 ASSESSMENT — PAIN DESCRIPTION - DESCRIPTORS: DESCRIPTORS: SHARP;RADIATING

## 2020-05-12 NOTE — PROGRESS NOTES
Spoke with Ivonne to follow up on her Emgality injectable therapy for treatment of her migraines. She denies any injection site irritation/reaction or any other side effects. She reports having had 2 migraines in the past 4 weeks that were at a pain level of 10/10 and lasted under an hour. She has not had any ER/UC visits in the last month and no missed work or planned activities. She rates her baseline pain at 6/10 and global wellness at 5/10 (0 being optimal). Prior to Emgality start, Ivonne had a migraine about every other day so she is pleased with the migraine relief from Emgality. Ivonne denies having any questions or concerns at this time and wishes to continue monthly TM calls, will schedule next call in May.

## 2020-06-02 ENCOUNTER — MEDICATION ACCESS (OUTPATIENT)
Dept: NEUROLOGY | Facility: CLINIC | Age: 50
End: 2020-06-02

## 2020-06-02 ENCOUNTER — TELEPHONE - BILLABLE (OUTPATIENT)
Dept: NEUROLOGY | Facility: CLINIC | Age: 50
End: 2020-06-02
Payer: COMMERCIAL

## 2020-06-02 DIAGNOSIS — G43.019 INTRACTABLE MIGRAINE WITHOUT AURA AND WITHOUT STATUS MIGRAINOSUS: ICD-10-CM

## 2020-06-02 PROCEDURE — 98967 PH1 ASSMT&MGMT NQHP 11-20: CPT | Performed by: NURSE PRACTITIONER

## 2020-06-02 RX ORDER — GALCANEZUMAB 120 MG/ML
120 INJECTION, SOLUTION SUBCUTANEOUS
Qty: 1 PEN | Refills: 11 | Status: SHIPPED | OUTPATIENT
Start: 2020-06-02 | End: 2021-08-23 | Stop reason: ALTCHOICE

## 2020-06-02 ASSESSMENT — ENCOUNTER SYMPTOMS
NAUSEA: 1
FEVER: 0
ABDOMINAL PAIN: 0
COLOR CHANGE: 0
SORE THROAT: 0
HEADACHES: 1
FATIGUE: 1
PALPITATIONS: 0
SEIZURES: 0
COUGH: 0
BACK PAIN: 0
PHOTOPHOBIA: 1
VOMITING: 0
EYE PAIN: 0
DYSURIA: 0
ARTHRALGIAS: 0
HEMATURIA: 0
WEAKNESS: 1
SHORTNESS OF BREATH: 0
CHILLS: 0

## 2020-06-02 NOTE — PROGRESS NOTES
Per discussion with Mary Jo Fowler CNP, Ivonne, has verbally consented to be treated via a telephone based visit: Yes. A total of 15 minutes were required for this telephone based visit.   Patient Location: Home  Provider Location: Clinic  Technology used by Provider: Phone    Headache Exam      HPI:  Ivonne Giron is a 49 y.o. female who has a telephone encounter today for follow-up of migraine.      Patient has greater than 15 days/month with headache.  Today patient notes that she is having the headaches on almost a daily basis. They are especially frontal area they are 7-10 out of 10 in severity. The pain is throbbing and stabbing. The pain is so bad she gets scared. It will hurt her so much that she hears ringing in her head. She can not wear her hair in a ponytail as it hurts. The headaches do last all day long. She is positive for sensitivity to light and sound. She is nauseated with these episodes. They make her throw up. She notes that she had the worst headache after her last botox treatment, but the headache went away in about two hours.     At her last appointment I prescribed Emgality. She is down to 5 migraines a month. They are down in severity and frequency. She will go to a dark room if she has a migraine. She is no longer on friocet. She will take tylenol once in awhile. She feels she has good control with the migraines.     Medications used: Botox, propanolol losartan, tramadol, Fioricet, gabapentin, topamax (she has had kidney stones), amitriptyline, hives       Histories:    Medical:    Past Medical History:   Diagnosis Date   • Abdominal pain, chronic, generalized    • Accelerated essential hypertension    • Chronic pain disorder    • Intercostal neuropathic pain    • Migraine    • Pain     surgical sight pain since gallbladder surgery         Surgical:    Past Surgical History:   Procedure Laterality Date   • CHOLECYSTECTOMY           Family:    Family History   Problem Relation Age of  Onset   • Arthritis Mother    • Hypertension Father    • Hypertension Other    • Tuberculosis Other    • Arthritis Other    • Diabetes Other    • Heart disease Other    • Migraines Other    • Migraines Mother's Sister          Social:    Social History     Socioeconomic History   • Marital status: Single     Spouse name: Not on file   • Number of children: Not on file   • Years of education: Not on file   • Highest education level: Not on file   Occupational History   • Not on file   Social Needs   • Financial resource strain: Not on file   • Food insecurity     Worry: Not on file     Inability: Not on file   • Transportation needs     Medical: Not on file     Non-medical: Not on file   Tobacco Use   • Smoking status: Never Smoker   • Smokeless tobacco: Never Used   Substance and Sexual Activity   • Alcohol use: No   • Drug use: No   • Sexual activity: Defer   Lifestyle   • Physical activity     Days per week: Not on file     Minutes per session: Not on file   • Stress: Not on file   Relationships   • Social connections     Talks on phone: Not on file     Gets together: Not on file     Attends Congregation service: Not on file     Active member of club or organization: Not on file     Attends meetings of clubs or organizations: Not on file     Relationship status: Not on file   • Intimate partner violence     Fear of current or ex partner: Not on file     Emotionally abused: Not on file     Physically abused: Not on file     Forced sexual activity: Not on file   Other Topics Concern   • Not on file   Social History Narrative   • Not on file       Allergies:    Allergies   Allergen Reactions   • Indomethacin      HIVES   • Prednisone      N/V   • Sulfa (Sulfonamide Antibiotics)      HIVES       Current Medications:    Current Outpatient Medications   Medication Sig Dispense Refill   • galcanezumab-gnlm (Emgality Pen) 120 mg/mL pen Inject 120 mg under the skin every 28 (twenty-eight) days 1 pen 11   • PARoxetine (PAXIL)  40 mg tablet Take 40 mg by mouth every morning     • alendronate (FOSAMAX) 70 mg tablet Take 70 mg by mouth once a week     • traMADoL (ULTRAM 50) 50 mg tablet Take 1-2 tablets ( mg total) by mouth every 4 (four) hours as needed for pain scale 8-10/10 Max Daily Amount: 600 mg 240 tablet 0   • gabapentin (NEURONTIN) 300 mg capsule TAKE 1 CAPSULE BY MOUTH THREE TIMES DAILY 90 capsule 3   • potassium chloride (KLOR-CON M20) 20 mEq CR tablet Take 20 mEq by mouth daily  1   • M- Plus 27 mg iron- 1 mg tablet Take 1 tablet by mouth daily     • omeprazole (PriLOSEC) 20 mg capsule Take 20 mg by mouth daily     • cetirizine (ZyrTEC) 10 mg tablet Take 10 mg by mouth as needed     • atorvastatin (LIPITOR) 40 mg tablet Take 40 mg by mouth nightly     • calcium carbonate-vitamin D3 (CALCIUM 500 WITH D) 500 mg(1,250mg) -400 unit tablet Take 1 tablet by mouth daily.       • losartan (COZAAR) 100 mg tablet Take by mouth daily.       • [START ON 2020] traMADoL (ULTRAM 50) 50 mg tablet Take 1-2 tablets ( mg total) by mouth every 4 (four) hours as needed for pain scale 8-10/10 Max Daily Amount: 600 mg 240 tablet 0   • [START ON 2020] traMADoL (ULTRAM 50) 50 mg tablet Take 1-2 tablets ( mg total) by mouth every 4 (four) hours as needed for pain scale 8-10/10 Max Daily Amount: 600 mg 240 tablet 0   • traMADoL (ULTRAM 50) 50 mg tablet Take 1-2 tablets ( mg total) by mouth every 4 (four) hours as needed for pain scale 8-10/10 Max Daily Amount: 8 tablets 240 tablet 0   • ibuprofen (MOTRIN IB) 200 mg tablet Take 200 mg by mouth as needed.       • prenatal 25-iron fum-folic-dha (PRENATAL-1) -200 mg-mcg-mg capsule Take 1 capsule by mouth daily.       No current facility-administered medications for this visit.        Home Medications:    Prior to Admission medications    Medication Sig Start Date End Date Taking? Authorizing Provider   PARoxetine (PAXIL) 40 mg tablet Take 40 mg by mouth every  morning 20   Historical Provider, MD   alendronate (FOSAMAX) 70 mg tablet Take 70 mg by mouth once a week 20   Historical Provider, MD   traMADoL (ULTRAM 50) 50 mg tablet Take 1-2 tablets ( mg total) by mouth every 4 (four) hours as needed for pain scale 8-10/10 Max Daily Amount: 600 mg 20  Roberto Elliott CNP   traMADoL (ULTRAM 50) 50 mg tablet Take 1-2 tablets ( mg total) by mouth every 4 (four) hours as needed for pain scale 8-10/10 Max Daily Amount: 600 mg 20  Roberto Elliott CNP   traMADoL (ULTRAM 50) 50 mg tablet Take 1-2 tablets ( mg total) by mouth every 4 (four) hours as needed for pain scale 8-10/10 Max Daily Amount: 600 mg 20  Roberto Elliott CNP   gabapentin (NEURONTIN) 300 mg capsule TAKE 1 CAPSULE BY MOUTH THREE TIMES DAILY 20   Roberto Elliott CNP   galcanezumab-gnlm (Emgality Pen) 120 mg/mL pen Inject 120 mg under the skin every 28 (twenty-eight) days 3/10/20   Mary Jo Fowler CNP   potassium chloride (KLOR-CON M20) 20 mEq CR tablet Take 20 mEq by mouth daily 19   Historical Provider, MD   M-[place] Plus 27 mg iron- 1 mg tablet Take 1 tablet by mouth daily 20   Historical Provider, MD   omeprazole (PriLOSEC) 20 mg capsule Take 20 mg by mouth daily 20   Historical Provider, MD   cetirizine (ZyrTEC) 10 mg tablet Take 10 mg by mouth as needed 20   Historical Provider, MD   atorvastatin (LIPITOR) 40 mg tablet Take 40 mg by mouth nightly 20   Historical Provider, MD   traMADoL (ULTRAM 50) 50 mg tablet Take 1-2 tablets ( mg total) by mouth every 4 (four) hours as needed for pain scale 8-10/10 Max Daily Amount: 8 tablets 20  Roberto Elliott CNP   butalbital-acetaminophen-caff (FIORICET, ESGIC) -40 mg Take 1 tablet by mouth every 4 (four) hours as needed Can use only two tablets a week.     Historical Provider, MD   ibuprofen (MOTRIN IB) 200 mg tablet Take 200 mg by mouth as needed.       Historical Provider, MD   prenatal 25-iron fum-folic-dha (PRENATAL-1) -200 mg-mcg-mg capsule Take 1 capsule by mouth daily.    Historical Provider, MD   calcium carbonate-vitamin D3 (CALCIUM 500 WITH D) 500 mg(1,250mg) -400 unit tablet Take 1 tablet by mouth daily.      Historical Provider, MD   losartan (COZAAR) 100 mg tablet Take by mouth daily.   1/17/13   Conversion Provider       Review of Systems:  Review of Systems   Constitutional: Positive for fatigue. Negative for chills and fever.   HENT: Negative for ear pain and sore throat.    Eyes: Positive for photophobia. Negative for pain and visual disturbance.   Respiratory: Negative for cough and shortness of breath.    Cardiovascular: Negative for chest pain and palpitations.   Gastrointestinal: Positive for nausea. Negative for abdominal pain and vomiting.   Genitourinary: Negative for dysuria and hematuria.   Musculoskeletal: Negative for arthralgias and back pain.   Skin: Negative for color change and rash.   Neurological: Positive for weakness and headaches. Negative for seizures and syncope.   All other systems reviewed and are negative.      OBJECTIVE    Labs:    A1c: No results found for: HGBA1C  Basic:   Lab Results   Component Value Date     05/26/2018    K 2.9 (LL) 05/26/2018     05/26/2018    CO2 21 05/26/2018    BUN 9 05/26/2018    CREATININE 0.7 05/26/2018    GLUCOSE 108 (H) 05/26/2018    CALCIUM 8.4 (L) 05/26/2018     CBC with Platelet:    Lab Results   Component Value Date    WBC 12.2 (H) 05/26/2018    HGB 13.7 05/26/2018    HCT 39.0 05/26/2018     05/26/2018    RBC 4.67 05/26/2018    MCV 83.5 05/26/2018    MCH 29.2 05/26/2018    MCHC 35.0 05/26/2018    RDW 13.6 05/26/2018    MPV 6.6 (L) 05/26/2018     Comp:   Lab Results   Component Value Date     05/26/2018    K 2.9 (LL) 05/26/2018     05/26/2018    CO2 21 05/26/2018    BUN 9 05/26/2018    CREATININE 0.7 05/26/2018    GLUCOSE 108 (H) 05/26/2018    CALCIUM  8.4 (L) 05/26/2018    PROT 7.6 05/26/2018    ALBUMIN 3.6 05/26/2018    AST 65 (H) 05/26/2018    ALT 82 (H) 05/26/2018    ALKPHOS 189 (H) 05/26/2018    BILITOT 0.66 05/26/2018     Coags:   Lab Results   Component Value Date    PT 14.9 (H) 05/26/2018    APTT 28 05/26/2018    INR 1.18 (H) 05/26/2018     Lipid: No results found for: CHOL, TRIG, HDLC, LDLC, HDL, LDL  TSH: No results found for: TSH    Vital Signs:  There were no vitals taken for this visit.  Physical Exam- Deferred as this is a telephone encounter  No results found.  Assessment:  Problem List Items Addressed This Visit        Nervous    Intractable migraine without aura and without status migrainosus    Relevant Medications    galcanezumab-gnlm (Emgality Pen) 120 mg/mL pen          Discussion:   49 y.o. female has a telephone encounter today for follow-up of migraine headaches. She was last seen in the clinic by Dr. Man. At that time propanolol was prescribed for preventive migraine control. She states no adverse effects however did not notice a difference in migraine pattern.      She has trialed multiple prophylactic medication without benefit with her migraines therefore I had previously ordered Emgality a CGRP.    At her previous appointment I did order a brain MRI and it did show newly identified T2 hyperintensities in the right frontal, left frontal and right centrum semi-ovale.  This imaging was shared with Dr. Grimm who had ordered CSF analysis to rule out demyelinating process.  The LP is scheduled for tomorrow and I will be in contact with the patient with the results.  She continues to this use Aimovig and has good control with her migraines.  She states that she has approximately 5 migraines a month which are easily controlled with Tylenol.  I am going to have her establish care with a new neurologist now that Dr. Grimm is gone.    Plan:  1.  Continue Aimovig.    2.  Establish care with new neurologist.    Patient is encouraged to  keep a headache log including type of headache, frequency of headache, and any triggers.    Patient encouraged to get plenty of sleep.    She  was counseled about maintaining proper body weight, exercise, and healthy lifestyle.    Patient is to stay active both physically and mentally.     She will follow up in this office in 4 months, sooner if needed. Patients questions were addressed and answered.     The plan for this visit has been reviewed.  No apparent barriers to understanding this information.  Patient's questions were addressed. I spent 15 minutes with the patient today with greater than 50% in counseling and/or coordination of care in regards to headaches.     A voice recognition program was used to aid in documentation of the record. Sometimes words are not printed exactly as they were spoken. While efforts were made to carefully edit and correct any inaccuracies, some may be present; please take these into context. Please contact the provider if areas are identified.     Mary Jo Fowler, CNP

## 2020-06-02 NOTE — PATIENT INSTRUCTIONS
1. Continue Emgality    2. I will call you with results of your LP    3. Follow up in 4 months, to establish care with a neurologist.

## 2020-06-29 ENCOUNTER — SPECIALTY PHARMACY (OUTPATIENT)
Dept: PHARMACY | Facility: HOSPITAL | Age: 50
End: 2020-06-29

## 2020-06-29 NOTE — PROGRESS NOTES
Multiple attempts made to reach via phone for therapy management follow up without success. Ivonne will be opted out of Therapy Management and inactivated in [facility]. This will not affect her refills with [facility].

## 2020-08-13 ENCOUNTER — TELEPHONE - BILLABLE (OUTPATIENT)
Dept: PAIN MEDICINE | Facility: HOSPITAL | Age: 50
End: 2020-08-13
Payer: COMMERCIAL

## 2020-08-13 DIAGNOSIS — R10.11 ABDOMINAL PAIN, CHRONIC, RIGHT UPPER QUADRANT: Primary | ICD-10-CM

## 2020-08-13 DIAGNOSIS — Z79.891 LONG-TERM CURRENT USE OF OPIATE ANALGESIC: ICD-10-CM

## 2020-08-13 DIAGNOSIS — R10.10 PAIN OF UPPER ABDOMEN: ICD-10-CM

## 2020-08-13 DIAGNOSIS — G89.29 ABDOMINAL PAIN, CHRONIC, RIGHT UPPER QUADRANT: Primary | ICD-10-CM

## 2020-08-13 RX ORDER — BUTALBITAL, ACETAMINOPHEN AND CAFFEINE 50; 325; 40 MG/1; MG/1; MG/1
1 TABLET ORAL EVERY 4 HOURS PRN
COMMUNITY
Start: 2020-08-03

## 2020-08-13 RX ORDER — TRAMADOL HYDROCHLORIDE 50 MG/1
50-100 TABLET ORAL EVERY 6 HOURS PRN
Qty: 240 TABLET | Refills: 0 | Status: SHIPPED | OUTPATIENT
Start: 2020-08-17 | End: 2020-11-03 | Stop reason: WASHOUT

## 2020-08-13 RX ORDER — TRAMADOL HYDROCHLORIDE 50 MG/1
50-100 TABLET ORAL EVERY 6 HOURS PRN
Qty: 240 TABLET | Refills: 0 | Status: SHIPPED | OUTPATIENT
Start: 2020-09-16 | End: 2020-11-03 | Stop reason: WASHOUT

## 2020-08-13 RX ORDER — GABAPENTIN 300 MG/1
CAPSULE ORAL
Qty: 90 CAPSULE | Refills: 3 | Status: SHIPPED | OUTPATIENT
Start: 2020-08-13 | End: 2020-12-14

## 2020-08-13 RX ORDER — TRAMADOL HYDROCHLORIDE 50 MG/1
50-100 TABLET ORAL EVERY 6 HOURS PRN
Qty: 240 TABLET | Refills: 0 | Status: SHIPPED | OUTPATIENT
Start: 2020-10-16 | End: 2020-11-10 | Stop reason: SDUPTHER

## 2020-08-13 ASSESSMENT — ENCOUNTER SYMPTOMS
CONSTIPATION: 0
HEADACHES: 1
DIARRHEA: 0
ABDOMINAL PAIN: 1

## 2020-08-13 ASSESSMENT — PAIN - FUNCTIONAL ASSESSMENT: PAIN_FUNCTIONAL_ASSESSMENT: 0-10

## 2020-08-13 ASSESSMENT — PAIN DESCRIPTION - DESCRIPTORS: DESCRIPTORS: SHARP;RADIATING;STABBING

## 2020-08-13 NOTE — PROGRESS NOTES
Subjective   Per discussion with Roberto Elliott, ANTHONY, Ivonne, has verbally consented to be treated via a telephone based visit: Yes. A total of 10 minutes were required for this telephone based visit.   Patient Location: Home  Provider Location: Clinic  Technology used by Provider: Phone    HPI  Ivonne Giron is a 49 y.o. female who presents for medication review.  She continues with abdominal pain and we wanted her to try Bentyl but it made her sleepy.  She still takes high-dose tramadol.    She still wants to stay on tramadol for her abd pain.     She was seeing neurology for headaches, she is getting some relief but they are coming back and she is on injections and fioricet.     Pain Score: 5 - Moderate pain(worst 10; best 5)  Pain Type: Chronic pain  Pain Location: Abdomen  Pain Radiating Towards: around to left side  Pain Descriptors: Sharp, Radiating, Stabbing  Pain Frequency: Constant/continuous  Pain Onset: Awakened from sleep  Clinical Progression: Not changed  Pain Interventions: Medication (See MAR), Repositioned, Rest, Heat applied  HPI    The following have been reviewed and updated as appropriate in this visit:    Allergies   Allergen Reactions   • Indomethacin      HIVES   • Prednisone      N/V   • Sulfa (Sulfonamide Antibiotics)      HIVES     Current Outpatient Medications   Medication Sig Dispense Refill   • galcanezumab-gnlm (Emgality Pen) 120 mg/mL pen Inject 120 mg under the skin every 28 (twenty-eight) days 1 pen 11   • PARoxetine (PAXIL) 40 mg tablet Take 40 mg by mouth every morning     • alendronate (FOSAMAX) 70 mg tablet Take 70 mg by mouth once a week     • traMADoL (ULTRAM 50) 50 mg tablet Take 1-2 tablets ( mg total) by mouth every 4 (four) hours as needed for pain scale 8-10/10 Max Daily Amount: 600 mg 240 tablet 0   • gabapentin (NEURONTIN) 300 mg capsule TAKE 1 CAPSULE BY MOUTH THREE TIMES DAILY 90 capsule 3   • potassium chloride (KLOR-CON M20) 20 mEq CR tablet Take 20 mEq  by mouth daily  1   • M- Plus 27 mg iron- 1 mg tablet Take 1 tablet by mouth daily     • omeprazole (PriLOSEC) 20 mg capsule Take 20 mg by mouth daily     • cetirizine (ZyrTEC) 10 mg tablet Take 10 mg by mouth as needed     • atorvastatin (LIPITOR) 40 mg tablet Take 40 mg by mouth nightly     • ibuprofen (MOTRIN IB) 200 mg tablet Take 200 mg by mouth as needed.       • prenatal 25-iron fum-folic-dha (PRENATAL-1) -200 mg-mcg-mg capsule Take 1 capsule by mouth daily.     • calcium carbonate-vitamin D3 (CALCIUM 500 WITH D) 500 mg(1,250mg) -400 unit tablet Take 1 tablet by mouth daily.       • losartan (COZAAR) 100 mg tablet Take by mouth daily.       • butalbital-acetaminophen-caff (FIORICET, ESGIC) -40 mg Take 1 tablet by mouth See administration instructions     • [START ON 2020] traMADoL (ULTRAM 50) 50 mg tablet Take 1-2 tablets ( mg total) by mouth every 6 (six) hours as needed for pain scale 8-10/10 Max Daily Amount: 8 tablets 240 tablet 0   • [START ON 2020] traMADoL (ULTRAM 50) 50 mg tablet Take 1-2 tablets ( mg total) by mouth every 6 (six) hours as needed for pain scale 8-10/10 Max Daily Amount: 8 tablets 240 tablet 0   • [START ON 10/16/2020] traMADoL (ULTRAM 50) 50 mg tablet Take 1-2 tablets ( mg total) by mouth every 6 (six) hours as needed for pain scale 8-10/10 Max Daily Amount: 8 tablets 240 tablet 0     No current facility-administered medications for this visit.      Past Medical History:   Diagnosis Date   • Abdominal pain, chronic, generalized    • Accelerated essential hypertension    • Chronic pain disorder    • Intercostal neuropathic pain    • Migraine    • Pain     surgical sight pain since gallbladder surgery     Past Surgical History:   Procedure Laterality Date   • CHOLECYSTECTOMY       Family History   Problem Relation Age of Onset   • Arthritis Mother    • Hypertension Father    • Hypertension Other    • Tuberculosis Other    • Arthritis Other     • Diabetes Other    • Heart disease Other    • Migraines Other    • Migraines Mother's Sister      Social History     Occupational History   • Not on file   Tobacco Use   • Smoking status: Never Smoker   • Smokeless tobacco: Never Used   Substance and Sexual Activity   • Alcohol use: No   • Drug use: No   • Sexual activity: Defer   Social History Narrative   • Not on file       Review of Systems   Gastrointestinal: Positive for abdominal pain. Negative for constipation and diarrhea.   Neurological: Positive for headaches.       Objective   Physical Exam  Nursing note reviewed.   Neurological:      Mental Status: She is oriented to person, place, and time.   Psychiatric:         Mood and Affect: Mood normal.         Behavior: Behavior normal.         Thought Content: Thought content normal.         Judgment: Judgment normal.         Assessment/Plan   Diagnoses and all orders for this visit:    Abdominal pain, chronic, right upper quadrant  -     traMADoL (ULTRAM 50) 50 mg tablet; Take 1-2 tablets ( mg total) by mouth every 6 (six) hours as needed for pain scale 8-10/10 Max Daily Amount: 8 tablets  -     traMADoL (ULTRAM 50) 50 mg tablet; Take 1-2 tablets ( mg total) by mouth every 6 (six) hours as needed for pain scale 8-10/10 Max Daily Amount: 8 tablets  -     traMADoL (ULTRAM 50) 50 mg tablet; Take 1-2 tablets ( mg total) by mouth every 6 (six) hours as needed for pain scale 8-10/10 Max Daily Amount: 8 tablets    Long-term current use of opiate analgesic  -     traMADoL (ULTRAM 50) 50 mg tablet; Take 1-2 tablets ( mg total) by mouth every 6 (six) hours as needed for pain scale 8-10/10 Max Daily Amount: 8 tablets  -     traMADoL (ULTRAM 50) 50 mg tablet; Take 1-2 tablets ( mg total) by mouth every 6 (six) hours as needed for pain scale 8-10/10 Max Daily Amount: 8 tablets  -     traMADoL (ULTRAM 50) 50 mg tablet; Take 1-2 tablets ( mg total) by mouth every 6 (six) hours as needed  for pain scale 8-10/10 Max Daily Amount: 8 tablets         PDMP  reviewed.  MME =40.  Narcannot done. Aberrant behavior = no. Affect/Mood = about the same. ADL = independent. Analgesia = adequate. Adverse Side Effects = no. Goals have been addressed and discussed with this patient.  The patient was advised that if at any time they feel oversedated to stop the medication and seek emergency care.    She said she continues to get episodes of really sharp pain along the left side of her abdomen that last for about 10 minutes.  She was told she could present to her local emergency room but she said she does not like going there and she doesn't want to drive back and forth.  I have seen her have 1 of these episodes and I suggested breathing through it etc. and she does try to do that and the meds keep it calm she says.  She will call with any other changes.  I reminded her at some point if [facility] will allow it or if she has the funds for it we should send her to [facility] so they can evaluate this.

## 2020-09-11 ENCOUNTER — ANCILLARY PROCEDURE (OUTPATIENT)
Dept: RADIOLOGY | Facility: HOSPITAL | Age: 50
End: 2020-09-11
Payer: COMMERCIAL

## 2020-09-11 DIAGNOSIS — R07.9 CHEST PAIN: ICD-10-CM

## 2020-09-24 ENCOUNTER — SPECIALTY PHARMACY (OUTPATIENT)
Dept: PHARMACY | Facility: HOSPITAL | Age: 50
End: 2020-09-24

## 2020-09-24 NOTE — PROGRESS NOTES
Ivonne Giron  1970  Ordering Provider:  MICHELE Fowler   Medication:  Emgality Pen 120MG/ML   Date RX Written:  06/02/20  Patient is 4 weeks past due for her refill. We have left her 4 voicemails, and she hasn't returned our calls. We will now place her Emgality on hold until we hear otherwise.    If you have additional questions, please call [facility] at [phone].

## 2020-09-30 NOTE — PROGRESS NOTES
I did get ahold if pt today on her mobile phone . She said her headaches were coming back . I let her know  Specialty pharm has been trying to get ahold of her. She said her last injection was the second week in august . I told her the headaches maybe returning since she is behind on injections. Pt will call the pharmacy.

## 2020-10-14 ENCOUNTER — TELEPHONE (OUTPATIENT)
Dept: PAIN MEDICINE | Facility: HOSPITAL | Age: 50
End: 2020-10-14

## 2020-10-14 NOTE — TELEPHONE ENCOUNTER
Ivonne left voicemail stating JAIRON Carpenter will be going into a lock down starting 10/15/2020 and is needing her Tramadol and Gabapentin refills early. Per Roberto zamarripa for patient to fill today the Tramadol and gabapentin scripts due 10/16/2020.    Notified [facility] Pharmacist Jimena of approval to fill early. Pharmacy will notify patient.

## 2020-11-09 ENCOUNTER — TELEPHONE (OUTPATIENT)
Dept: PAIN MEDICINE | Facility: HOSPITAL | Age: 50
End: 2020-11-09

## 2020-11-10 DIAGNOSIS — Z79.891 LONG-TERM CURRENT USE OF OPIATE ANALGESIC: ICD-10-CM

## 2020-11-10 DIAGNOSIS — G89.29 ABDOMINAL PAIN, CHRONIC, RIGHT UPPER QUADRANT: ICD-10-CM

## 2020-11-10 DIAGNOSIS — R10.11 ABDOMINAL PAIN, CHRONIC, RIGHT UPPER QUADRANT: ICD-10-CM

## 2020-11-10 NOTE — TELEPHONE ENCOUNTER
Patient called stating that she wanted to pick meds up on Friday11/13 due to transportation. due on 11/15 PDMP monitored.last fill 10/14 but not due till 10/16

## 2020-11-11 ENCOUNTER — TELEPHONE (OUTPATIENT)
Dept: PAIN MEDICINE | Facility: HOSPITAL | Age: 50
End: 2020-11-11

## 2020-11-11 RX ORDER — TRAMADOL HYDROCHLORIDE 50 MG/1
50-100 TABLET ORAL EVERY 6 HOURS PRN
Qty: 240 TABLET | Refills: 0 | Status: SHIPPED | OUTPATIENT
Start: 2020-11-15 | End: 2021-02-23 | Stop reason: WASHOUT

## 2020-11-11 NOTE — TELEPHONE ENCOUNTER
Patient return call and states she is unable to keep appointment on Friday and request to  tramadol today because she needs to come to [place] today with her daughter.she is not due until 11/15/20 and does have  Appointments 11/17/20

## 2020-11-16 ENCOUNTER — TELEPHONE - BILLABLE (OUTPATIENT)
Dept: NEUROLOGY | Facility: CLINIC | Age: 50
End: 2020-11-16
Payer: COMMERCIAL

## 2020-11-16 DIAGNOSIS — R90.89 ABNORMAL FINDING ON MRI OF BRAIN: ICD-10-CM

## 2020-11-16 DIAGNOSIS — G43.019 INTRACTABLE MIGRAINE WITHOUT AURA AND WITHOUT STATUS MIGRAINOSUS: Primary | ICD-10-CM

## 2020-11-16 PROCEDURE — 98967 PH1 ASSMT&MGMT NQHP 11-20: CPT | Performed by: PSYCHIATRY & NEUROLOGY

## 2020-11-16 RX ORDER — RIMEGEPANT SULFATE 75 MG/75MG
75 TABLET, ORALLY DISINTEGRATING ORAL DAILY PRN
Qty: 8 TABLET | Refills: 11 | Status: ON HOLD | OUTPATIENT
Start: 2020-11-16 | End: 2024-12-16

## 2020-11-16 NOTE — PROGRESS NOTES
Subjective   Per discussion with Nicolette Avila MD, Ivonne, has verbally consented to be treated via a telephone based visit: Yes. A total of 22 minutes were required for this telephone based visit.   Patient Location: Home  Provider Location: Clinic  Technology used by Provider: Phone    Neurology History and Physical    11/16/20  10:28 AM    No chief complaint on file.  migraine, abnormal MR brain imaging    HPI  Ivonne Giron is a 49 y.o. woman who presents today for evaluation of headaches. She has been previously seen by Tianna Fowler CNP and I have reviewed those records in detail.     Patient has greater than 15 days/month with headache.  Describes as frontal, 7-10 out of 10 in severity. The pain is throbbing and stabbing. It will hurt her so much that she hears ringing in her head. She can not wear her hair in a ponytail as it hurts. The headaches do last all day long. She is positive for sensitivity to light and sound. +nausea/vomiting. Medications used/failed: Botox, propanolol, losartan, tramadol, Fioricet, gabapentin, topamax (she has had kidney stones), amitriptyline, hives, failed triptans. She has not been taking emgality as she forgets to order the medication. The last time she had the emgality was a month ago. The headaches are less severe with the emgality. She had a migraine last week and this was the first migraine she has had in a long time. She states that she had to go to clinic to have treatment. She was given diclofenac to take for the migraines. She is not taking any tylenol or ibuprofen. She inquires if there is a pill similar to emgality that she could try.     She underwent MR brain in the past which showed T2 hyperintensities in the right frontal, left frontal and right centrum semi-ovale. Dr. Grimm ordered CSF analysis. Does not appear that patient went through with this. Denies any transient neurological symptoms in the past. No hx of vision loss/optic neuritis, no hx of  symptoms consistent with MS.     Histories:    Medical:    Past Medical History:   Diagnosis Date   • Abdominal pain, chronic, generalized    • Accelerated essential hypertension    • Chronic pain disorder    • Intercostal neuropathic pain    • Migraine    • Pain     surgical sight pain since gallbladder surgery         Surgical:    Past Surgical History:   Procedure Laterality Date   • CHOLECYSTECTOMY           Family:    Family History   Problem Relation Age of Onset   • Arthritis Mother    • Hypertension Father    • Hypertension Other    • Tuberculosis Other    • Arthritis Other    • Diabetes Other    • Heart disease Other    • Migraines Other    • Migraines Mother's Sister          Social:    Social History     Socioeconomic History   • Marital status: Single     Spouse name: Not on file   • Number of children: Not on file   • Years of education: Not on file   • Highest education level: Not on file   Occupational History   • Not on file   Social Needs   • Financial resource strain: Not on file   • Food insecurity     Worry: Not on file     Inability: Not on file   • Transportation needs     Medical: Not on file     Non-medical: Not on file   Tobacco Use   • Smoking status: Never Smoker   • Smokeless tobacco: Never Used   Substance and Sexual Activity   • Alcohol use: No   • Drug use: No   • Sexual activity: Defer   Lifestyle   • Physical activity     Days per week: Not on file     Minutes per session: Not on file   • Stress: Not on file   Relationships   • Social connections     Talks on phone: Not on file     Gets together: Not on file     Attends Christian service: Not on file     Active member of club or organization: Not on file     Attends meetings of clubs or organizations: Not on file     Relationship status: Not on file   • Intimate partner violence     Fear of current or ex partner: Not on file     Emotionally abused: Not on file     Physically abused: Not on file     Forced sexual activity: Not on file    Other Topics Concern   • Not on file   Social History Narrative   • Not on file       Allergies:    Allergies   Allergen Reactions   • Indomethacin      HIVES   • Prednisone      N/V   • Sulfa (Sulfonamide Antibiotics)      HIVES       Current Medications:    Current Outpatient Medications   Medication Sig Dispense Refill   • traMADoL (ULTRAM 50) 50 mg tablet Take 1-2 tablets ( mg total) by mouth every 6 (six) hours as needed for pain scale 8-10/10 Max Daily Amount: 8 tablets 240 tablet 0   • butalbital-acetaminophen-caff (FIORICET, ESGIC) -40 mg Take 1 tablet by mouth See administration instructions     • gabapentin (NEURONTIN) 300 mg capsule TAKE 1 CAPSULE BY MOUTH THREE TIMES DAILY 90 capsule 3   • PARoxetine (PAXIL) 40 mg tablet Take 40 mg by mouth every morning     • alendronate (FOSAMAX) 70 mg tablet Take 70 mg by mouth once a week     • potassium chloride (KLOR-CON M20) 20 mEq CR tablet Take 20 mEq by mouth daily  1   • M-[place] Plus 27 mg iron- 1 mg tablet Take 1 tablet by mouth daily     • omeprazole (PriLOSEC) 20 mg capsule Take 20 mg by mouth daily     • cetirizine (ZyrTEC) 10 mg tablet Take 10 mg by mouth as needed     • atorvastatin (LIPITOR) 40 mg tablet Take 40 mg by mouth nightly     • ibuprofen (MOTRIN IB) 200 mg tablet Take 200 mg by mouth as needed.       • prenatal 25-iron fum-folic-dha (PRENATAL-1) -200 mg-mcg-mg capsule Take 1 capsule by mouth daily.     • calcium carbonate-vitamin D3 (CALCIUM 500 WITH D) 500 mg(1,250mg) -400 unit tablet Take 1 tablet by mouth daily.       • losartan (COZAAR) 100 mg tablet Take by mouth daily.       • rimegepant (Nurtec ODT) 75 mg tablet,disintegrating Take 75 mg by mouth daily as needed (migraine) Max 1 tab per 24 hours 8 tablet 11   • galcanezumab-gnlm (Emgality Pen) 120 mg/mL pen Inject 120 mg under the skin every 28 (twenty-eight) days (Patient not taking: Reported on 2020 ) 1 pen 11     No current facility-administered medications  for this visit.        Review of Systems:  See HPI. All other systems were reviewed and were negative.     OBJECTIVE           Physical Exam:    Telephone visit    Labs:      CBC with Platelet:    Lab Results   Component Value Date    WBC 12.2 (H) 05/26/2018    HGB 13.7 05/26/2018    HCT 39.0 05/26/2018     05/26/2018    RBC 4.67 05/26/2018    MCV 83.5 05/26/2018    MCH 29.2 05/26/2018    MCHC 35.0 05/26/2018    RDW 13.6 05/26/2018    MPV 6.6 (L) 05/26/2018     Automated Differential:   Lab Results   Component Value Date    NEUTROABS 9.50 05/26/2018    LYMPHOPCT 13 05/26/2018    MONOPCT 8 05/26/2018    MONOSABS 1.00 05/26/2018    EOSPCT 0 05/26/2018    EOSABS 0.00 05/26/2018    BASOSABS 0.10 05/26/2018    BASOPCT 1 05/26/2018     Absolute Count:    Lab Results   Component Value Date    NEUTROABS 9.50 05/26/2018    LYMPHSABS 1.60 05/26/2018    EOSABS 0.00 05/26/2018     Comp:   Lab Results   Component Value Date     05/26/2018    K 2.9 (LL) 05/26/2018     05/26/2018    CO2 21 05/26/2018    BUN 9 05/26/2018    CREATININE 0.7 05/26/2018    GLUCOSE 108 (H) 05/26/2018    CALCIUM 8.4 (L) 05/26/2018    PROT 7.6 05/26/2018    ALBUMIN 3.6 05/26/2018    AST 65 (H) 05/26/2018    ALT 82 (H) 05/26/2018    ALKPHOS 189 (H) 05/26/2018    BILITOT 0.66 05/26/2018     Lipid: No results found for: CHOL, TRIG, HDLC, LDLC, HDL, LDL  TSH: No results found for: TSH    Imaging studies:    EXAM:   MRI of the brain without contrast from 03/23/2020     CLINICAL HISTORY:   Intractable migraine without aura and without status migrainosus; Worsing headaches.     COMPARISON(s):   2/13/2008     TECHNIQUE:   Sagittal and axial T1, and axial T2, FLAIR, and diffusion-weighted sequences were obtained through the brain.     FINDINGS:   Focal FLAIR hyperintense left extra-axial structure (series 4, image 17), incompletely evaluated without IV contrast but probable small meningioma.  Multifocal white matter hyperintensities involving  the periventricular, deep and subcortical white matter, nonspecific but likely secondary to chronic ischemic white matter disease. The diffusion weighted images demonstrate no evidence of acute infarct. There is no hydrocephalus, acute hemorrhage, mass effect, midline shift, or extra axial fluid collection.Midline structures are within normal limits. The paranasal sinuses are clear..         IMPRESSION:  1. No acute infarct.  2. Multifocal white matter hyperintensities involving the periventricular, deep and subcortical white matter, nonspecific but likely secondary to chronic ischemic white matter disease. This is worsened since 2008.    Assessment:    Problem List Items Addressed This Visit        Nervous    Intractable migraine without aura and without status migrainosus - Primary    Relevant Medications    rimegepant (Nurtec ODT) 75 mg tablet,disintegrating       Other    Abnormal finding on MRI of brain            Plan:   No orders of the defined types were placed in this encounter.      Assessment/Plan   1. Migraine headaches - emgality works well, but she sometimes forgets to order this. She was encouraged to try to stay on this. She would also like to try nurtec ODT for abortive therapy. Will see if insurance will cover both. Alternatively, she can try stopping the emgality and just using nurtec ODT prn, but I suspect that her headache frequency will increase without the emgality. Will see what we hear from the insurance company.   2. Abnormal MR brain - she did not proceed with CSF analysis. Will plan to repeat MR brain imaging in March 2021 and assess to see if changes are stable. Will consider LP pending these results. She does not have any hx of symptoms of demyelination. Suspect that these changes are secondary to chronic ischemic white matter disease rather than demyelination. Images personally reviewed.     A voice recognition program was used to aid in documentation of the record. Sometimes words are  not printed exactly as they were spoken. While efforts were made to carefully edit and correct any inaccuracies, some may be present; please take these into context. Please contact the provider if areas are identified.     The diagnostic assessment has been reviewed. Patient and/or patient's surrogate has expressed a good understanding of the assessment and recommendations from today's visit. There are no apparent barriers to understanding this information. Patient's questions were addressed. Patient has been advised to contact this office or the answering service with questions or concerns that may arise. Patient has been advised to follow up with Primary Care Provider for general medical needs.     Nicolette Avila MD

## 2020-11-17 ENCOUNTER — OFFICE VISIT (OUTPATIENT)
Dept: PAIN MEDICINE | Facility: HOSPITAL | Age: 50
End: 2020-11-17
Payer: COMMERCIAL

## 2020-11-17 DIAGNOSIS — R10.11 ABDOMINAL PAIN, CHRONIC, RIGHT UPPER QUADRANT: ICD-10-CM

## 2020-11-17 DIAGNOSIS — G89.29 ABDOMINAL PAIN, CHRONIC, RIGHT UPPER QUADRANT: ICD-10-CM

## 2020-11-17 DIAGNOSIS — Z79.891 ENCOUNTER FOR LONG-TERM OPIATE ANALGESIC USE: ICD-10-CM

## 2020-11-17 DIAGNOSIS — G89.4 CHRONIC PAIN SYNDROME: Primary | ICD-10-CM

## 2020-11-17 PROCEDURE — 99001 SPECIMEN HANDLING PT-LAB: CPT

## 2020-11-17 PROCEDURE — G0463 HOSPITAL OUTPT CLINIC VISIT: HCPCS

## 2020-11-17 RX ORDER — TRAMADOL HYDROCHLORIDE 50 MG/1
50-100 TABLET ORAL EVERY 4 HOURS PRN
Qty: 240 TABLET | Refills: 0 | Status: SHIPPED | OUTPATIENT
Start: 2021-02-14 | End: 2021-02-23 | Stop reason: WASHOUT

## 2020-11-17 RX ORDER — PAROXETINE 10 MG/1
10 TABLET, FILM COATED ORAL EVERY MORNING
COMMUNITY
Start: 2020-10-15 | End: 2021-08-23 | Stop reason: ALTCHOICE

## 2020-11-17 RX ORDER — TRAMADOL HYDROCHLORIDE 50 MG/1
50-100 TABLET ORAL EVERY 4 HOURS PRN
Qty: 240 TABLET | Refills: 0 | Status: SHIPPED | OUTPATIENT
Start: 2020-12-16 | End: 2021-03-17

## 2020-11-17 RX ORDER — TRAMADOL HYDROCHLORIDE 50 MG/1
50-100 TABLET ORAL EVERY 4 HOURS PRN
Qty: 240 TABLET | Refills: 0 | Status: SHIPPED | OUTPATIENT
Start: 2021-01-15 | End: 2021-02-15 | Stop reason: SDUPTHER

## 2020-11-17 ASSESSMENT — PAIN - FUNCTIONAL ASSESSMENT: PAIN_FUNCTIONAL_ASSESSMENT: 0-10

## 2020-11-17 ASSESSMENT — ENCOUNTER SYMPTOMS
DIARRHEA: 0
NAUSEA: 0
ABDOMINAL PAIN: 1
CONSTIPATION: 0
COUGH: 1

## 2020-11-17 NOTE — PROGRESS NOTES
Patient ID: Ivonne Giron is a 49 y.o. female.    Pt presents today to evaluate medication review. She is on max tramadol 50 mg and gabapentin 300 mg tid.    Patient reports pain is abd pain.     PAST PAIN HISTORY  Medications previously tried -bentyl makes her sleepy,.  Other modalities completed -neurology for headaches  Past Injection List   TPI  ICNB    Pain Type: Chronic pain  Pain Location: Abdomen  Pain Radiating Towards: around to left side  Pain Descriptors: Sharp, Stabbing, Radiating, Dull, Aching  Pain Frequency: Constant/continuous  Pain Onset: Awakened from sleep  Clinical Progression: Not changed  There were no vitals taken for this visit.      Current Outpatient Medications:   •  PARoxetine (PAXIL) 10 mg tablet, Take 10 mg by mouth every morning, Disp: , Rfl:   •  rimegepant (Nurtec ODT) 75 mg tablet,disintegrating, Take 75 mg by mouth daily as needed (migraine) Max 1 tab per 24 hours, Disp: 8 tablet, Rfl: 11  •  traMADoL (ULTRAM 50) 50 mg tablet, Take 1-2 tablets ( mg total) by mouth every 6 (six) hours as needed for pain scale 8-10/10 Max Daily Amount: 8 tablets, Disp: 240 tablet, Rfl: 0  •  butalbital-acetaminophen-caff (FIORICET, ESGIC) -40 mg, Take 1 tablet by mouth See administration instructions, Disp: , Rfl:   •  gabapentin (NEURONTIN) 300 mg capsule, TAKE 1 CAPSULE BY MOUTH THREE TIMES DAILY, Disp: 90 capsule, Rfl: 3  •  galcanezumab-gnlm (Emgality Pen) 120 mg/mL pen, Inject 120 mg under the skin every 28 (twenty-eight) days, Disp: 1 pen, Rfl: 11  •  PARoxetine (PAXIL) 40 mg tablet, Take 40 mg by mouth every morning, Disp: , Rfl:   •  alendronate (FOSAMAX) 70 mg tablet, Take 70 mg by mouth once a week, Disp: , Rfl:   •  potassium chloride (KLOR-CON M20) 20 mEq CR tablet, Take 20 mEq by mouth daily, Disp: , Rfl: 1  •  M- Plus 27 mg iron- 1 mg tablet, Take 1 tablet by mouth daily, Disp: , Rfl:   •  omeprazole (PriLOSEC) 20 mg capsule, Take 20 mg by mouth daily, Disp: ,  Rfl:   •  cetirizine (ZyrTEC) 10 mg tablet, Take 10 mg by mouth as needed, Disp: , Rfl:   •  atorvastatin (LIPITOR) 40 mg tablet, Take 40 mg by mouth nightly, Disp: , Rfl:   •  ibuprofen (MOTRIN IB) 200 mg tablet, Take 200 mg by mouth as needed.  , Disp: , Rfl:   •  prenatal 25-iron fum-folic-dha (PRENATAL-1) 30975-200 mg-mcg-mg capsule, Take 1 capsule by mouth daily., Disp: , Rfl:   •  calcium carbonate-vitamin D3 (CALCIUM 500 WITH D) 500 mg(1,250mg) -400 unit tablet, Take 1 tablet by mouth daily.  , Disp: , Rfl:   •  losartan (COZAAR) 100 mg tablet, Take by mouth daily.  , Disp: , Rfl:   •  [START ON 12/16/2020] traMADoL (ULTRAM 50) 50 mg tablet, Take 1-2 tablets ( mg total) by mouth every 4 (four) hours as needed for pain scale 8-10/10 Max Daily Amount: 600 mg, Disp: 240 tablet, Rfl: 0  •  [START ON 1/15/2021] traMADoL (ULTRAM 50) 50 mg tablet, Take 1-2 tablets ( mg total) by mouth every 4 (four) hours as needed for pain scale 8-10/10 Max Daily Amount: 600 mg, Disp: 240 tablet, Rfl: 0  •  [START ON 2/14/2021] traMADoL (ULTRAM 50) 50 mg tablet, Take 1-2 tablets ( mg total) by mouth every 4 (four) hours as needed for pain scale 8-10/10 Max Daily Amount: 600 mg, Disp: 240 tablet, Rfl: 0  Review of Systems   Respiratory: Positive for cough.    Gastrointestinal: Positive for abdominal pain. Negative for constipation, diarrhea and nausea.         Past Medical History:   Diagnosis Date   • Abdominal pain, chronic, generalized    • Accelerated essential hypertension    • Chronic pain disorder    • Intercostal neuropathic pain    • Migraine    • Pain     surgical sight pain since gallbladder surgery        Past Surgical History:   Procedure Laterality Date   • CHOLECYSTECTOMY         Imaging:  No results found.    Physical Exam  Vitals signs and nursing note reviewed.   Constitutional:       General: She is not in acute distress.     Appearance: She is well-developed.   HENT:      Head: Normocephalic.    Eyes:      Conjunctiva/sclera: Conjunctivae normal.   Neck:      Musculoskeletal: Normal range of motion and neck supple.   Cardiovascular:      Rate and Rhythm: Normal rate.   Pulmonary:      Effort: Pulmonary effort is normal. No respiratory distress.   Abdominal:      General: There is no distension.      Tenderness: There is abdominal tenderness. There is no guarding.   Musculoskeletal: Normal range of motion.         General: No tenderness or deformity.      Comments:      Skin:     General: Skin is warm and dry.      Findings: No rash.   Neurological:      Mental Status: She is alert and oriented to person, place, and time.   Psychiatric:         Behavior: Behavior normal.         Thought Content: Thought content normal.         Judgment: Judgment normal.         ASSESSMENT    1. Chronic pain syndrome    2. Encounter for long-term opiate analgesic use    3. Abdominal pain, chronic, right upper quadrant        PLAN    Medication Therapy: Continue tramadol 50 mg 1-2 every 4 with a max of 8 and low-dose gabapentin.  Injections: None  Referrals placed: None  Consider for future treatments: [facility]    PDMP  reviewed.  MME =40.  Narcannot done. Aberrant behavior = no. Affect/Mood = about the same. ADL = independent. Analgesia = adequate. Adverse Side Effects = no. Goals have been addressed and discussed with this patient.  The patient was advised that if at any time they feel oversedated to stop the medication and seek emergency care. UDS and contract done today    She has lost a lot of family lately due to covid[place]in [place] and [place].     DISCUSSION  Today's plan was a combined effort between the patient and myself. The patient understood the plan, verbally consented, and agreed to participate. An After Visit Summary with special instructions/information regarding today's office visit was given to the patient (see patient instructions).     The diagnostic assessment has been reviewed. Patient and/or  patient's surrogate has expressed a good understanding of the assessment and recommendations from today's visit. There are no apparent barriers to understanding this information. Patient’s questions were addressed.  I have reviewed the patients current medications using all immediate resources available.     Patient has been advised to contact this office or the answering service with questions or concerns that may arise. Patient has been advised to follow up with Primary Care Provider for general medical needs.     A total of 15 minutes was required for this visit. Greater than 50% of this time was spent in direct face to face communication with the patient and/or surrogate in order to provide counseling regarding her meds and abd pain.    Dragon voice recognition program was used to aid in this documentation which might generate inaccuracies despite efforts made to avoid them.  Please take it into context and contact the provider with any questions.    Roberto Elliott, CNP

## 2020-11-23 LAB — DRUGS UR: NORMAL

## 2020-12-14 DIAGNOSIS — R10.10 PAIN OF UPPER ABDOMEN: ICD-10-CM

## 2020-12-14 RX ORDER — GABAPENTIN 300 MG/1
CAPSULE ORAL
Qty: 90 CAPSULE | Refills: 3 | Status: SHIPPED | OUTPATIENT
Start: 2020-12-14 | End: 2021-04-15

## 2021-02-15 DIAGNOSIS — G89.4 CHRONIC PAIN SYNDROME: ICD-10-CM

## 2021-02-15 DIAGNOSIS — G89.29 ABDOMINAL PAIN, CHRONIC, RIGHT UPPER QUADRANT: ICD-10-CM

## 2021-02-15 DIAGNOSIS — R10.11 ABDOMINAL PAIN, CHRONIC, RIGHT UPPER QUADRANT: ICD-10-CM

## 2021-02-15 DIAGNOSIS — Z79.891 ENCOUNTER FOR LONG-TERM OPIATE ANALGESIC USE: ICD-10-CM

## 2021-02-15 RX ORDER — TRAMADOL HYDROCHLORIDE 50 MG/1
50-100 TABLET ORAL EVERY 4 HOURS PRN
Qty: 210 TABLET | Refills: 0 | Status: SHIPPED | OUTPATIENT
Start: 2021-02-15 | End: 2021-02-23 | Stop reason: WASHOUT

## 2021-02-15 NOTE — TELEPHONE ENCOUNTER
Per rosario instructions may fill for #210. Previous script cancelled and reorder for #210. Next appointment is March 3rd.

## 2021-02-15 NOTE — TELEPHONE ENCOUNTER
Received call from pharmacy stating that patient received #30 tramadol 5o from another provider 2/10/21. He is wanting to know if she can   her script for 240 yesterday. Pharmacist questioning if  Script should be filled.see PDMP.

## 2021-02-18 ENCOUNTER — TELEPHONE (OUTPATIENT)
Dept: PAIN MEDICINE | Facility: HOSPITAL | Age: 51
End: 2021-02-18

## 2021-02-18 ENCOUNTER — ANCILLARY PROCEDURE (OUTPATIENT)
Dept: RADIOLOGY | Facility: HOSPITAL | Age: 51
End: 2021-02-18
Payer: COMMERCIAL

## 2021-02-18 DIAGNOSIS — R52 PAIN: ICD-10-CM

## 2021-03-02 ENCOUNTER — TELEPHONE (OUTPATIENT)
Dept: PAIN MEDICINE | Facility: HOSPITAL | Age: 51
End: 2021-03-02

## 2021-03-02 NOTE — TELEPHONE ENCOUNTER
Patient called to state that she had surgery on her ankle with plate and screws. She states that the surgeon wrote meds but they will not let her pick it up unless you call pharmacy and ok it.

## 2021-03-03 ENCOUNTER — TELEPHONE (OUTPATIENT)
Dept: PAIN MEDICINE | Facility: HOSPITAL | Age: 51
End: 2021-03-03

## 2021-03-03 NOTE — TELEPHONE ENCOUNTER
Called pharmacy and [facility] and informed them it was ok to order post op pain meds

## 2021-03-16 DIAGNOSIS — G89.29 ABDOMINAL PAIN, CHRONIC, RIGHT UPPER QUADRANT: ICD-10-CM

## 2021-03-16 DIAGNOSIS — G89.4 CHRONIC PAIN SYNDROME: ICD-10-CM

## 2021-03-16 DIAGNOSIS — R10.11 ABDOMINAL PAIN, CHRONIC, RIGHT UPPER QUADRANT: ICD-10-CM

## 2021-03-16 DIAGNOSIS — Z79.891 ENCOUNTER FOR LONG-TERM OPIATE ANALGESIC USE: ICD-10-CM

## 2021-03-17 RX ORDER — TRAMADOL HYDROCHLORIDE 50 MG/1
TABLET ORAL
Qty: 240 TABLET | Refills: 0 | Status: SHIPPED | OUTPATIENT
Start: 2021-03-17 | End: 2022-03-02 | Stop reason: WASHOUT

## 2021-03-17 NOTE — TELEPHONE ENCOUNTER
Refill request received by pharmacy. Last refill 2/15/21. PDMP monitored. She was a no show for 3/2/21 appointment.no future appointments made. There are no available appointments unless we use the 1130 appointment or a pump appointment

## 2021-03-17 NOTE — TELEPHONE ENCOUNTER
Yes she had a partial refill to give her a full months worth for a fracture  Ankle now she is due for her next month and she is in town for another appointment

## 2021-03-17 NOTE — TELEPHONE ENCOUNTER
Thanks but I thought you sent meds that day, didn't she have a fracture or something as a reason to miss the appt?

## 2021-03-24 ENCOUNTER — TELEPHONE (OUTPATIENT)
Dept: PAIN MEDICINE | Facility: HOSPITAL | Age: 51
End: 2021-03-24

## 2021-04-01 ENCOUNTER — TELEPHONE (OUTPATIENT)
Dept: PAIN MEDICINE | Facility: HOSPITAL | Age: 51
End: 2021-04-01

## 2021-04-01 NOTE — TELEPHONE ENCOUNTER
Tramadol 50 mg tablet   Approved through 2/15/2022  Doses/quantities above plan limits may be subject to further review.   Approval received 2/15/21  Pharmacy was notified.

## 2021-04-02 ENCOUNTER — OFFICE VISIT (OUTPATIENT)
Dept: PAIN MEDICINE | Facility: HOSPITAL | Age: 51
End: 2021-04-02
Payer: COMMERCIAL

## 2021-04-02 DIAGNOSIS — S82.891D CLOSED FRACTURE OF RIGHT ANKLE WITH ROUTINE HEALING: ICD-10-CM

## 2021-04-02 DIAGNOSIS — Z79.891 LONG-TERM CURRENT USE OF OPIATE ANALGESIC: Primary | ICD-10-CM

## 2021-04-02 DIAGNOSIS — R10.11 ABDOMINAL PAIN, CHRONIC, RIGHT UPPER QUADRANT: ICD-10-CM

## 2021-04-02 DIAGNOSIS — G89.29 ABDOMINAL PAIN, CHRONIC, RIGHT UPPER QUADRANT: ICD-10-CM

## 2021-04-02 PROCEDURE — G0463 HOSPITAL OUTPT CLINIC VISIT: HCPCS

## 2021-04-02 RX ORDER — TRAMADOL HYDROCHLORIDE 50 MG/1
50 TABLET ORAL EVERY 4 HOURS PRN
Qty: 240 TABLET | Refills: 0 | Status: SHIPPED | OUTPATIENT
Start: 2021-06-16 | End: 2021-07-15 | Stop reason: SDUPTHER

## 2021-04-02 RX ORDER — TRAMADOL HYDROCHLORIDE 50 MG/1
50 TABLET ORAL EVERY 4 HOURS PRN
Qty: 240 TABLET | Refills: 0 | Status: SHIPPED | OUTPATIENT
Start: 2021-05-17 | End: 2021-08-23 | Stop reason: WASHOUT

## 2021-04-02 RX ORDER — TRAMADOL HYDROCHLORIDE 50 MG/1
50 TABLET ORAL EVERY 4 HOURS PRN
Qty: 240 TABLET | Refills: 0 | Status: SHIPPED | OUTPATIENT
Start: 2021-04-17 | End: 2021-08-23 | Stop reason: WASHOUT

## 2021-04-02 ASSESSMENT — PAIN DESCRIPTION - DESCRIPTORS
DESCRIPTORS: THROBBING
DESCRIPTORS: ACHING;DULL;SHARP;STABBING

## 2021-04-02 ASSESSMENT — ENCOUNTER SYMPTOMS
ABDOMINAL PAIN: 1
ARTHRALGIAS: 1
MYALGIAS: 1

## 2021-04-02 ASSESSMENT — PAIN - FUNCTIONAL ASSESSMENT
PAIN_FUNCTIONAL_ASSESSMENT: 0-10
PAIN_FUNCTIONAL_ASSESSMENT: 0-10

## 2021-04-15 ENCOUNTER — TELEPHONE (OUTPATIENT)
Dept: PAIN MEDICINE | Facility: HOSPITAL | Age: 51
End: 2021-04-15

## 2021-04-15 DIAGNOSIS — R10.10 PAIN OF UPPER ABDOMEN: ICD-10-CM

## 2021-04-15 RX ORDER — GABAPENTIN 300 MG/1
CAPSULE ORAL
Qty: 90 CAPSULE | Refills: 2 | Status: SHIPPED | OUTPATIENT
Start: 2021-04-15 | End: 2021-07-15

## 2021-04-15 NOTE — TELEPHONE ENCOUNTER
Patient left voice message requesting to  script for Tramadol tomorrow, 4/16, instead of 4/17 when she is due to fill. Patient state's she is riding up with her cousin tomorrow and doesn't want to come up again on Saturday.

## 2021-04-16 ENCOUNTER — TELEPHONE (OUTPATIENT)
Dept: PAIN MEDICINE | Facility: HOSPITAL | Age: 51
End: 2021-04-16

## 2021-04-16 NOTE — TELEPHONE ENCOUNTER
Called pharmacy to inform that they can fill script today. Called patient to inform that she can  script today when she is here in [place].

## 2021-04-27 ENCOUNTER — TELEPHONE (OUTPATIENT)
Dept: PAIN MEDICINE | Facility: HOSPITAL | Age: 51
End: 2021-04-27

## 2021-04-27 NOTE — TELEPHONE ENCOUNTER
Patient called to inform Roberto that she was seen in [place] for bruised and swollen ankles/legs. They prescribed Tramadol #14 for patient to take regarding the pain associated with this. Patient wants to make sure that it is okay to fill this prescription with Roberto because she has had issues in the past getting her prescriptions filled that Roberto writes then?

## 2021-04-27 NOTE — TELEPHONE ENCOUNTER
Not so sure about this as she is at max for gabapentin currently and cant take more than 8 per day so these wont be able to be taken anyways.  Does she want a few hydrocodone (#6?) to help?

## 2021-04-28 NOTE — TELEPHONE ENCOUNTER
Patient informed that she is already at the max Tramadol amount she can get. The pharmacy is not going to allow her to fill an additional amount of it. She state's they were going to prescribe her Hydrocodone, but she told them it makes her sick. Informed that there are other medications they could prescribe besides Hydrocodone and Tramadol. She state's she will take the Tramadol script back and see if they can order something different for her. She reports she has to go back and see them any ways to get a boot for her leg.

## 2021-05-17 ENCOUNTER — TELEPHONE (OUTPATIENT)
Dept: NEUROLOGY | Facility: CLINIC | Age: 51
End: 2021-05-17

## 2021-05-17 ENCOUNTER — TELEPHONE (OUTPATIENT)
Dept: PAIN MEDICINE | Facility: HOSPITAL | Age: 51
End: 2021-05-17

## 2021-05-17 NOTE — TELEPHONE ENCOUNTER
NO SHOW - The appt for 5/18/21 that I rescheduled was actually a NO SHOW appt, that I forgot to label when I rescheduled this appt.

## 2021-07-15 DIAGNOSIS — G89.29 ABDOMINAL PAIN, CHRONIC, RIGHT UPPER QUADRANT: ICD-10-CM

## 2021-07-15 DIAGNOSIS — S82.891D CLOSED FRACTURE OF RIGHT ANKLE WITH ROUTINE HEALING: ICD-10-CM

## 2021-07-15 DIAGNOSIS — R10.11 ABDOMINAL PAIN, CHRONIC, RIGHT UPPER QUADRANT: ICD-10-CM

## 2021-07-15 DIAGNOSIS — Z79.891 LONG-TERM CURRENT USE OF OPIATE ANALGESIC: ICD-10-CM

## 2021-07-15 DIAGNOSIS — R10.10 PAIN OF UPPER ABDOMEN: ICD-10-CM

## 2021-07-15 RX ORDER — TRAMADOL HYDROCHLORIDE 50 MG/1
50 TABLET ORAL EVERY 4 HOURS PRN
Qty: 240 TABLET | Refills: 0 | Status: SHIPPED | OUTPATIENT
Start: 2021-07-16 | End: 2021-08-16

## 2021-07-15 RX ORDER — GABAPENTIN 300 MG/1
CAPSULE ORAL
Qty: 90 CAPSULE | Refills: 3 | Status: SHIPPED | OUTPATIENT
Start: 2021-07-15 | End: 2021-08-23 | Stop reason: SDUPTHER

## 2021-08-13 DIAGNOSIS — Z79.891 LONG-TERM CURRENT USE OF OPIATE ANALGESIC: ICD-10-CM

## 2021-08-13 DIAGNOSIS — R10.11 ABDOMINAL PAIN, CHRONIC, RIGHT UPPER QUADRANT: ICD-10-CM

## 2021-08-13 DIAGNOSIS — S82.891D CLOSED FRACTURE OF RIGHT ANKLE WITH ROUTINE HEALING: ICD-10-CM

## 2021-08-13 DIAGNOSIS — G89.29 ABDOMINAL PAIN, CHRONIC, RIGHT UPPER QUADRANT: ICD-10-CM

## 2021-08-16 ENCOUNTER — TELEPHONE (OUTPATIENT)
Dept: PAIN MEDICINE | Facility: HOSPITAL | Age: 51
End: 2021-08-16

## 2021-08-16 RX ORDER — TRAMADOL HYDROCHLORIDE 50 MG/1
TABLET ORAL
Qty: 240 TABLET | Refills: 0 | Status: SHIPPED | OUTPATIENT
Start: 2021-08-16 | End: 2021-08-23 | Stop reason: SDUPTHER

## 2021-08-16 NOTE — TELEPHONE ENCOUNTER
Patient's last appt 04/02. Patients last visit with you canceled due to time off. No appt schedule. Will reach out to patient to schedule. Last refill 07/16

## 2021-08-23 ENCOUNTER — OFFICE VISIT (OUTPATIENT)
Dept: PAIN MEDICINE | Facility: HOSPITAL | Age: 51
End: 2021-08-23
Payer: COMMERCIAL

## 2021-08-23 VITALS
OXYGEN SATURATION: 93 % | HEART RATE: 69 BPM | RESPIRATION RATE: 16 BRPM | SYSTOLIC BLOOD PRESSURE: 136 MMHG | DIASTOLIC BLOOD PRESSURE: 90 MMHG

## 2021-08-23 DIAGNOSIS — S82.891D CLOSED FRACTURE OF RIGHT ANKLE WITH ROUTINE HEALING: ICD-10-CM

## 2021-08-23 DIAGNOSIS — G89.29 ABDOMINAL PAIN, CHRONIC, RIGHT UPPER QUADRANT: Primary | ICD-10-CM

## 2021-08-23 DIAGNOSIS — R10.10 PAIN OF UPPER ABDOMEN: ICD-10-CM

## 2021-08-23 DIAGNOSIS — R10.11 ABDOMINAL PAIN, CHRONIC, RIGHT UPPER QUADRANT: Primary | ICD-10-CM

## 2021-08-23 DIAGNOSIS — Z79.891 LONG-TERM CURRENT USE OF OPIATE ANALGESIC: ICD-10-CM

## 2021-08-23 PROCEDURE — G0463 HOSPITAL OUTPT CLINIC VISIT: HCPCS

## 2021-08-23 RX ORDER — HYDROXYZINE HYDROCHLORIDE 25 MG/1
12.5 TABLET, FILM COATED ORAL 2 TIMES DAILY
Status: ON HOLD | COMMUNITY
Start: 2021-08-11 | End: 2024-12-16

## 2021-08-23 RX ORDER — TRAMADOL HYDROCHLORIDE 50 MG/1
50-100 TABLET ORAL EVERY 6 HOURS PRN
Qty: 240 TABLET | Refills: 0 | Status: SHIPPED | OUTPATIENT
Start: 2021-11-14 | End: 2022-03-02 | Stop reason: WASHOUT

## 2021-08-23 RX ORDER — GABAPENTIN 300 MG/1
300 CAPSULE ORAL 3 TIMES DAILY
Qty: 90 CAPSULE | Refills: 2 | Status: SHIPPED | OUTPATIENT
Start: 2021-08-23 | End: 2022-01-11

## 2021-08-23 RX ORDER — TRAMADOL HYDROCHLORIDE 50 MG/1
50-100 TABLET ORAL EVERY 6 HOURS PRN
Qty: 240 TABLET | Refills: 0 | Status: SHIPPED | OUTPATIENT
Start: 2021-09-15 | End: 2022-03-02 | Stop reason: WASHOUT

## 2021-08-23 RX ORDER — TRAMADOL HYDROCHLORIDE 50 MG/1
50-100 TABLET ORAL EVERY 6 HOURS PRN
Qty: 240 TABLET | Refills: 0 | Status: SHIPPED | OUTPATIENT
Start: 2021-10-15 | End: 2022-03-02 | Stop reason: WASHOUT

## 2021-08-23 ASSESSMENT — ENCOUNTER SYMPTOMS
ARTHRALGIAS: 1
ABDOMINAL PAIN: 1
MYALGIAS: 1

## 2021-08-23 ASSESSMENT — PAIN DESCRIPTION - DESCRIPTORS: DESCRIPTORS: SHARP

## 2021-08-23 ASSESSMENT — PAIN - FUNCTIONAL ASSESSMENT: PAIN_FUNCTIONAL_ASSESSMENT: 0-10

## 2021-08-23 NOTE — PROGRESS NOTES
"Patient ID: Ivonne Giron is a 50 y.o. female.    Pt presents today to evaluate medication review.    CHIEF COMPLAINT:  Patient reports pain is abdomen and right ankle    HPI:  She takes tramadol 50 mg up to 8 per day and this helps her pain and keeps her abd pain down somewhat but it comes on suddenly and this helps.    She had an ankle fracture on the right and it is still swollen and she is having imaging tomorrow and trying to walk on it more but she thinks the \"screw is sticking out\".     PAST PAIN HISTORY  Medications previously tried -bentyl makes her sleepy, butrans patch,hydrocodone post op   Other modalities completed -neurology for headaches  Past Injection List   TPI  ICNB    Pain Assessment: 0-10  Pain Score: 5 - Moderate pain (best 3, worst 10)  Pain Type: Chronic pain  Pain Location: Abdomen  Pain Radiating Towards: center and radiates out to the sides  Pain Descriptors: Sharp  Pain Frequency: Constant/continuous  Interference on Function: too much movement  Pain Onset: Awakened from sleep  Clinical Progression: Not changed  Aggravating Factors: ROM, Walking, Standing  Pain Interventions: Medication (See MAR), Home medication, Rest, Other (Comment) (limit activity)   /90 (BP Location: Right arm, Patient Position: Sitting)   Pulse 69   Resp 16   SpO2 93%       Current Outpatient Medications:   •  hydrOXYzine (ATARAX) 25 mg tablet, Take 12.5 mg by mouth 2 (two) times a day, Disp: , Rfl:   •  gabapentin (NEURONTIN) 300 mg capsule, Take 1 capsule (300 mg total) by mouth 3 (three) times a day, Disp: 90 capsule, Rfl: 2  •  traMADoL (ULTRAM 50) 50 mg tablet, TAKE 1 TO 2 TABLETS BY MOUTH EVERY 4 HOURS AS NEEDED . DO NOT EXCEED 8 PER 24 HOURS FOR  PAIN  SCALE  8-10/10, Disp: 240 tablet, Rfl: 0  •  rimegepant (Nurtec ODT) 75 mg tablet,disintegrating, Take 75 mg by mouth daily as needed (migraine) Max 1 tab per 24 hours, Disp: 8 tablet, Rfl: 11  •  butalbital-acetaminophen-caff (FIORICET, ESGIC) " -40 mg, Take 1 tablet by mouth See administration instructions, Disp: , Rfl:   •  PARoxetine (PAXIL) 40 mg tablet, Take 40 mg by mouth every morning, Disp: , Rfl:   •  alendronate (FOSAMAX) 70 mg tablet, Take 70 mg by mouth once a week, Disp: , Rfl:   •  potassium chloride (KLOR-CON M20) 20 mEq CR tablet, Take 20 mEq by mouth daily, Disp: , Rfl: 1  •  M- Plus 27 mg iron- 1 mg tablet, Take 1 tablet by mouth daily, Disp: , Rfl:   •  cetirizine (ZyrTEC) 10 mg tablet, Take 10 mg by mouth as needed, Disp: , Rfl:   •  atorvastatin (LIPITOR) 40 mg tablet, Take 40 mg by mouth nightly, Disp: , Rfl:   •  ibuprofen (MOTRIN IB) 200 mg tablet, Take 200 mg by mouth as needed.  , Disp: , Rfl:   •  prenatal 25-iron fum-folic-dha (PRENATAL-1) 30975-200 mg-mcg-mg capsule, Take 1 capsule by mouth daily., Disp: , Rfl:   •  calcium carbonate-vitamin D3 (CALCIUM 500 WITH D) 500 mg(1,250mg) -400 unit tablet, Take 1 tablet by mouth daily.  , Disp: , Rfl:   •  losartan (COZAAR) 100 mg tablet, Take by mouth daily.  , Disp: , Rfl:   •  [START ON 2021] traMADoL (ULTRAM 50) 50 mg tablet, Take 1-2 tablets ( mg total) by mouth every 6 (six) hours as needed for pain scale 8-10/10 Max Daily Amount: 400 mg, Disp: 240 tablet, Rfl: 0  •  [START ON 10/15/2021] traMADoL (ULTRAM 50) 50 mg tablet, Take 1-2 tablets ( mg total) by mouth every 6 (six) hours as needed for pain scale 8-10/10 Max Daily Amount: 400 mg, Disp: 240 tablet, Rfl: 0  •  [START ON 9/15/2021] traMADoL (ULTRAM 50) 50 mg tablet, Take 1-2 tablets ( mg total) by mouth every 6 (six) hours as needed for pain scale 8-10/10 Max Daily Amount: 400 mg, Disp: 240 tablet, Rfl: 0  Review of Systems   Gastrointestinal: Positive for abdominal pain.   Musculoskeletal: Positive for arthralgias and myalgias.         Past Medical History:   Diagnosis Date   • Abdominal pain, chronic, generalized    • Accelerated essential hypertension    • Chronic pain disorder    •  Intercostal neuropathic pain    • Migraine    • Pain     surgical sight pain since gallbladder surgery        Past Surgical History:   Procedure Laterality Date   • ANKLE SURGERY Right 02/2020   • CHOLECYSTECTOMY         Imaging:  No results found.    Physical Exam  Vitals and nursing note reviewed.   Constitutional:       General: She is not in acute distress.     Appearance: She is well-developed.   HENT:      Head: Normocephalic.   Eyes:      Conjunctiva/sclera: Conjunctivae normal.   Cardiovascular:      Rate and Rhythm: Normal rate.   Pulmonary:      Effort: Pulmonary effort is normal. No respiratory distress.   Abdominal:      General: There is no distension.          Comments: No abd distension or pain today at visit   Musculoskeletal:         General: No tenderness or deformity.      Cervical back: Normal range of motion and neck supple.      Right ankle: Swelling present. No deformity. Decreased range of motion.        Legs:       Comments: Doesn't move right ankle well, slight swelling and wearing flip flop shoes and guards right foot slightly, no discoloration or changes to foot      Skin:     General: Skin is warm and dry.      Findings: No rash.   Neurological:      Mental Status: She is alert and oriented to person, place, and time.   Psychiatric:         Behavior: Behavior normal.         Thought Content: Thought content normal.         Judgment: Judgment normal.         ASSESSMENT    1. Abdominal pain, chronic, right upper quadrant    2. Long-term current use of opiate analgesic    3. Closed fracture of right ankle with routine healing    4. Pain of upper abdomen        PLAN    Medication Therapy: tramadol 50 mg #240, due dates 9/15/2021, 10/15/2021 11/14/2021.  Injections: none  Referrals placed: none  Consider for future treatments: unsure at some point would like to send her to [facility]  reviewed.  MME =40.  Narcannot done. Aberrant behavior = no. Affect/Mood = about the same. ADL =  independent. Analgesia = adequate. Adverse Side Effects = no. Goals have been addressed and discussed with this patient.  The patient was advised that if at any time they feel oversedated to stop the medication and seek emergency care. UDS and contract 11/20    DISCUSSION  Today's plan was a combined effort between the patient and myself. The patient understood the plan, verbally consented, and agreed to participate. An After Visit Summary with special instructions/information regarding today's office visit was given to the patient (see patient instructions).     The diagnostic assessment has been reviewed. Patient and/or patient's surrogate has expressed a good understanding of the assessment and recommendations from today's visit. There are no apparent barriers to understanding this information. Patient’s questions were addressed.  I have reviewed the patients current medications using all immediate resources available.     Patient has been advised to contact this office or the answering service with questions or concerns that may arise. Patient has been advised to follow up with Primary Care Provider for general medical needs.     A total of 15 minutes was required for this visit. Greater than 50% of this time was spent in direct face to face communication with the patient and/or surrogate in order to provide counseling regarding meds and her ankle issues.    Dragon voice recognition program was used to aid in this documentation which might generate inaccuracies despite efforts made to avoid them.  Please take it into context and contact the provider with any questions.    Roberto Elliott, CNP

## 2021-12-03 ENCOUNTER — OFFICE VISIT (OUTPATIENT)
Dept: PAIN MEDICINE | Facility: HOSPITAL | Age: 51
End: 2021-12-03
Payer: COMMERCIAL

## 2021-12-03 VITALS
RESPIRATION RATE: 16 BRPM | DIASTOLIC BLOOD PRESSURE: 90 MMHG | OXYGEN SATURATION: 93 % | HEART RATE: 86 BPM | SYSTOLIC BLOOD PRESSURE: 144 MMHG

## 2021-12-03 DIAGNOSIS — R10.11 ABDOMINAL PAIN, CHRONIC, RIGHT UPPER QUADRANT: ICD-10-CM

## 2021-12-03 DIAGNOSIS — Z79.891 LONG-TERM CURRENT USE OF OPIATE ANALGESIC: Primary | ICD-10-CM

## 2021-12-03 DIAGNOSIS — G89.29 ABDOMINAL PAIN, CHRONIC, RIGHT UPPER QUADRANT: ICD-10-CM

## 2021-12-03 DIAGNOSIS — S82.891D CLOSED FRACTURE OF RIGHT ANKLE WITH ROUTINE HEALING: ICD-10-CM

## 2021-12-03 LAB
CREAT UR-MCNC: 158.6 MG/DL
ETHANOL SERPL-MCNC: <10 MG/DL
PH UR STRIP.AUTO: 6 PH
SP GR UR STRIP.AUTO: 1.02 (ref 1–1.03)

## 2021-12-03 PROCEDURE — 82570 ASSAY OF URINE CREATININE: CPT | Performed by: NURSE PRACTITIONER

## 2021-12-03 PROCEDURE — 80326 AMPHETAMINES 5 OR MORE: CPT | Performed by: NURSE PRACTITIONER

## 2021-12-03 PROCEDURE — 80349 CANNABINOIDS NATURAL: CPT | Performed by: NURSE PRACTITIONER

## 2021-12-03 PROCEDURE — 80345 DRUG SCREENING BARBITURATES: CPT | Performed by: NURSE PRACTITIONER

## 2021-12-03 PROCEDURE — 99001 SPECIMEN HANDLING PT-LAB: CPT

## 2021-12-03 PROCEDURE — G0480 DRUG TEST DEF 1-7 CLASSES: HCPCS | Performed by: NURSE PRACTITIONER

## 2021-12-03 PROCEDURE — 80353 DRUG SCREENING COCAINE: CPT | Performed by: NURSE PRACTITIONER

## 2021-12-03 PROCEDURE — G0463 HOSPITAL OUTPT CLINIC VISIT: HCPCS

## 2021-12-03 PROCEDURE — 80364 OPIOID &OPIATE ANALOG 5/MORE: CPT | Performed by: NURSE PRACTITIONER

## 2021-12-03 PROCEDURE — 80347 BENZODIAZEPINES 13 OR MORE: CPT | Performed by: NURSE PRACTITIONER

## 2021-12-03 RX ORDER — TRAMADOL HYDROCHLORIDE 50 MG/1
50-100 TABLET ORAL EVERY 6 HOURS PRN
Qty: 240 TABLET | Refills: 0 | Status: SHIPPED | OUTPATIENT
Start: 2021-12-14 | End: 2022-03-02 | Stop reason: WASHOUT

## 2021-12-03 RX ORDER — TRAMADOL HYDROCHLORIDE 50 MG/1
50-100 TABLET ORAL EVERY 6 HOURS PRN
Qty: 240 TABLET | Refills: 0 | Status: SHIPPED | OUTPATIENT
Start: 2022-01-13 | End: 2022-03-02 | Stop reason: WASHOUT

## 2021-12-03 RX ORDER — TRAMADOL HYDROCHLORIDE 50 MG/1
50-100 TABLET ORAL EVERY 6 HOURS PRN
Qty: 240 TABLET | Refills: 0 | Status: SHIPPED | OUTPATIENT
Start: 2022-02-12 | End: 2022-06-02 | Stop reason: WASHOUT

## 2021-12-03 ASSESSMENT — ENCOUNTER SYMPTOMS
ABDOMINAL PAIN: 1
HEADACHES: 1

## 2021-12-03 ASSESSMENT — PAIN DESCRIPTION - DESCRIPTORS: DESCRIPTORS: SHARP;SPASM

## 2021-12-03 ASSESSMENT — PAIN - FUNCTIONAL ASSESSMENT: PAIN_FUNCTIONAL_ASSESSMENT: 0-10

## 2021-12-03 NOTE — PROGRESS NOTES
Patient ID: Ivonne Giron is a 51 y.o. female.    Pt presents today to evaluate medication review.    CHIEF COMPLAINT:  Patient reports pain is abd pain, headaches and minimal right ankle pain    HPI:  Her chronic opioids consist of tramadol 50 mg up to 8/day and usually her abdominal pain will come on suddenly and then she vomits and sometimes she gets dizzy.  She has been dealing with right ankle fracture recovery and today wearing boots and still has some pain and walks slow and it still bruises at times.      PAST PAIN HISTORY  Medications previously tried -bentyl makes her sleepy, butrans patch,hydrocodone post op   Other modalities completed -neurology for headaches  Past Injection List   TPI  ICNB      Pain Assessment: 0-10  Pain Score: 5 - Moderate pain (worse10, best5)  Pain Type: Chronic pain  Pain Location: Abdomen  Pain Radiating Towards: center, out to the sides  Pain Descriptors: Sharp,Spasm  Pain Frequency: Constant/continuous  Pain Onset: Awakened from sleep  Clinical Progression: Not changed  Aggravating Factors: Walking   /90 (BP Location: Right arm, Patient Position: Sitting)   Pulse 86   Resp 16   SpO2 93%       Current Outpatient Medications:   •  hydrOXYzine (ATARAX) 25 mg tablet, Take 12.5 mg by mouth 2 (two) times a day, Disp: , Rfl:   •  traMADoL (ULTRAM 50) 50 mg tablet, Take 1-2 tablets ( mg total) by mouth every 6 (six) hours as needed for pain scale 8-10/10 Max Daily Amount: 400 mg, Disp: 240 tablet, Rfl: 0  •  gabapentin (NEURONTIN) 300 mg capsule, Take 1 capsule (300 mg total) by mouth 3 (three) times a day, Disp: 90 capsule, Rfl: 2  •  traMADoL (ULTRAM 50) 50 mg tablet, TAKE 1 TO 2 TABLETS BY MOUTH EVERY 4 HOURS AS NEEDED . DO NOT EXCEED 8 PER 24 HOURS FOR  PAIN  SCALE  8-10/10, Disp: 240 tablet, Rfl: 0  •  rimegepant (Nurtec ODT) 75 mg tablet,disintegrating, Take 75 mg by mouth daily as needed (migraine) Max 1 tab per 24 hours, Disp: 8 tablet, Rfl: 11  •   butalbital-acetaminophen-caff (FIORICET, ESGIC) -40 mg, Take 1 tablet by mouth See administration instructions, Disp: , Rfl:   •  PARoxetine (PAXIL) 40 mg tablet, Take 40 mg by mouth every morning, Disp: , Rfl:   •  alendronate (FOSAMAX) 70 mg tablet, Take 70 mg by mouth once a week, Disp: , Rfl:   •  potassium chloride (KLOR-CON M20) 20 mEq CR tablet, Take 20 mEq by mouth daily, Disp: , Rfl: 1  •  M-Libia Plus 27 mg iron- 1 mg tablet, Take 1 tablet by mouth daily, Disp: , Rfl:   •  cetirizine (ZyrTEC) 10 mg tablet, Take 10 mg by mouth as needed, Disp: , Rfl:   •  atorvastatin (LIPITOR) 40 mg tablet, Take 40 mg by mouth nightly, Disp: , Rfl:   •  ibuprofen (MOTRIN IB) 200 mg tablet, Take 200 mg by mouth as needed.  , Disp: , Rfl:   •  prenatal 25-iron fum-folic-dha (PRENATAL-1) 30975-200 mg-mcg-mg capsule, Take 1 capsule by mouth daily., Disp: , Rfl:   •  calcium carbonate-vitamin D3 (CALCIUM 500 WITH D) 500 mg(1,250mg) -400 unit tablet, Take 1 tablet by mouth daily.  , Disp: , Rfl:   •  losartan (COZAAR) 100 mg tablet, Take by mouth daily.  , Disp: , Rfl:   •  traMADoL (ULTRAM 50) 50 mg tablet, Take 1-2 tablets ( mg total) by mouth every 6 (six) hours as needed for pain scale 8-10/10 Max Daily Amount: 400 mg, Disp: 240 tablet, Rfl: 0  •  traMADoL (ULTRAM 50) 50 mg tablet, Take 1-2 tablets ( mg total) by mouth every 6 (six) hours as needed for pain scale 8-10/10 Max Daily Amount: 400 mg, Disp: 240 tablet, Rfl: 0  Review of Systems   Gastrointestinal: Positive for abdominal pain.   Neurological: Positive for headaches.         Past Medical History:   Diagnosis Date   • Abdominal pain, chronic, generalized    • Accelerated essential hypertension    • Chronic pain disorder    • Intercostal neuropathic pain    • Migraine    • Pain     surgical sight pain since gallbladder surgery        Past Surgical History:   Procedure Laterality Date   • ANKLE SURGERY Right 2020   • CHOLECYSTECTOMY          Imaging:  No results found.    Physical Exam  Vitals and nursing note reviewed.   Constitutional:       General: She is not in acute distress.     Appearance: She is well-developed.   HENT:      Head: Normocephalic.   Eyes:      Conjunctiva/sclera: Conjunctivae normal.   Cardiovascular:      Rate and Rhythm: Normal rate.   Pulmonary:      Effort: Pulmonary effort is normal. No respiratory distress.   Abdominal:      General: There is no distension.      Tenderness: There is no abdominal tenderness. There is no guarding or rebound.   Musculoskeletal:         General: No tenderness or deformity. Normal range of motion.      Cervical back: Normal range of motion and neck supple.      Comments: Moves well in the room but limps on her right foot somewhat    No issues with her abdominal pain during exam today     Skin:     General: Skin is warm and dry.      Findings: No rash.   Neurological:      Mental Status: She is alert and oriented to person, place, and time.   Psychiatric:         Behavior: Behavior normal.         Thought Content: Thought content normal.         Judgment: Judgment normal.         ASSESSMENT    1. Long-term current use of opiate analgesic    2. Closed fracture of right ankle with routine healing    3. Abdominal pain, chronic, right upper quadrant        PLAN    Medication Therapy: Stay on the tramadol up to 8/day and due dates discussed.  Injections: None  Referrals placed: I told her I still wonder if we should send an E consult to [facility]  Consider for future treatments: Unsure as I am not sure what truly is going on with her abdomen but she seems to be stable on her medications    PDMP reviewed.  MME = 40.  Narcan not done. Aberrant behavior = no. Affect/Mood = about the same. ADL = independent. Analgesia = adequate. Adverse Side Effects = no. Goals have been addressed and discussed with this patient.  The patient was advised that if at any time they feel oversedated to stop the  medication and seek emergency care. UDS and contract done today    DISCUSSION  Today's plan was a combined effort between the patient and myself. The patient understood the plan, verbally consented, and agreed to participate. An After Visit Summary with special instructions/information regarding today's office visit was given to the patient (see patient instructions).     The diagnostic assessment has been reviewed. Patient and/or patient's surrogate has expressed a good understanding of the assessment and recommendations from today's visit. There are no apparent barriers to understanding this information. Patient’s questions were addressed.  I have reviewed the patients current medications using all immediate resources available.     Patient has been advised to contact this office or the answering service with questions or concerns that may arise. Patient has been advised to follow up with Primary Care Provider for general medical needs.     A total of 15 minutes was required for this visit. Greater than 50% of this time was spent in direct face to face communication with the patient and/or surrogate in order to provide counseling regarding her med use and her episodes of abdominal pain in her right ankle.    Dragon voice recognition program was used to aid in this documentation which might generate inaccuracies despite efforts made to avoid them.  Please take it into context and contact the provider with any questions.    Roberto Elliott, CNP

## 2021-12-08 LAB
2-HYDROXYETHYLFLURAZEPAM DETECTION BY LCMS: NOT DETECTED
6-MAM DETECTION BY LCMS: NOT DETECTED
6A-NALOXOL DETECTION BY LCMS: NOT DETECTED
7-AMINOCLONAZEPAM DETECTION BY LCMS: NOT DETECTED
ALPHA-HYDROXYALPRAZOLAM DETECTION BY LCMS: NOT DETECTED
ALPHA-HYDROXYTRIAZOLAM DETECTION BY LCMS: NOT DETECTED
ALPRAZOLAM DETECTION BY LCMS: NOT DETECTED
AMOBARBITAL/PENTOBARBITAL DETECTION BY LCMS: NOT DETECTED
AMPHETAMINE DETECTION BY LCMS: NOT DETECTED
BENZOYLECGONINE DETECTION BY LCMS: NOT DETECTED
BUPRENORPHINE DETECTION BY LCMS: NOT DETECTED
BUTALBITAL DETECTION BY LCMS: DETECTED
CLONAZEPAM DETECTION BY LCMS: NOT DETECTED
CODEINE DETECTION BY LCMS: NOT DETECTED
DESALKYLFLURAZEPAM DETECTION BY LCMS: NOT DETECTED
DESMETHYLTAPENTADOL DETECTION BY LCMS: NOT DETECTED
DIAZEPAM DETECTION BY LCMS: NOT DETECTED
DIHYDROCODEINE DETECTION BY LCMS: NOT DETECTED
EDDP DETECTION BY LCMS: NOT DETECTED
FENTANYL DETECTION BY LCMS: NOT DETECTED
HYDROCODONE DETECTION BY LCMS: NOT DETECTED
HYDROMORPHONE DETECTION BY LCMS: NOT DETECTED
HYDROXYMIDAZOLAM DETECTION BY LCMS: NOT DETECTED
LORAZEPAM DETECTION BY LCMS: NOT DETECTED
MDA (ECSTASY METABOLITE) DETECTION BY LCMS: NOT DETECTED
MDEA DETECTION BY LCMS: NOT DETECTED
MDMA (ECSTASY) DETECTION BY LCMS: NOT DETECTED
MEPERIDINE DETECTION BY LCMS: NOT DETECTED
METHADONE DETECTION BY LCMS: NOT DETECTED
METHAMPHET BLD-MCNC: NOT DETECTED UG/ML
MIDAZOLAM DETECTION BY LCMS: NOT DETECTED
MORPHINE DETECTION BY LCMS: NOT DETECTED
NALOXONE DETECTION BY LCMS: NOT DETECTED
NORBUPRENORPHINE DETECTION BY LCMS: NOT DETECTED
NORDIAZEPAM DETECTION BY LCMS: NOT DETECTED
NORFENTANYL DETECTION BY LCMS: NOT DETECTED
NORHYDROCODONE DETECTION BY LCMS: NOT DETECTED
NORHYDROMORPHONE DETECTION BY LCMS: NOT DETECTED
NORMEPERIDINE DETECTION BY LCMS: NOT DETECTED
NOROXYCODONE DETECTION BY LCMS: NOT DETECTED
NORPROPOXYPHENE DETECTION BY LCMS: NOT DETECTED
O-DESMETHYLTRAMADOL DETECTION BY LCMS: DETECTED
OXAZEPAM DETECTION BY LCMS: NOT DETECTED
OXYCODONE DETECTION BY LCMS: NOT DETECTED
OXYMORPHONE DETECTION BY LCMS: NOT DETECTED
PCP DETECTION BY LCMS: NOT DETECTED
PHENOBARBITAL DETECTION BY LCMS: NOT DETECTED
PHENTERMINE DETECTION BY LCMS: NOT DETECTED
PROPOXYPHENE DETECTION BY LCMS: NOT DETECTED
RITALINIC ACID DETECTION BY LCMS: NOT DETECTED
SECOBARBITAL DETECTION BY LCMS: NOT DETECTED
TAPENTADOL DETECTION BY LCMS: NOT DETECTED
TEMAZEPAM DETECTION BY LCMS: NOT DETECTED
THC-COOH DETECTION BY LCMS: NOT DETECTED
TRAMADOL DETECTION BY LCMS: DETECTED
TRIAZOLAM DETECTION BY LCMS: NOT DETECTED
ZOLPIDEM DETECTION BY LCMS: NOT DETECTED
ZOLPIDEM P4CA DETECTION BY LCMS: NOT DETECTED

## 2022-01-09 ENCOUNTER — ANCILLARY PROCEDURE (OUTPATIENT)
Dept: RADIOLOGY | Facility: HOSPITAL | Age: 52
End: 2022-01-09
Payer: COMMERCIAL

## 2022-01-09 DIAGNOSIS — R52 PAIN: ICD-10-CM

## 2022-01-11 DIAGNOSIS — R10.10 PAIN OF UPPER ABDOMEN: ICD-10-CM

## 2022-01-11 RX ORDER — GABAPENTIN 300 MG/1
CAPSULE ORAL
Qty: 90 CAPSULE | Refills: 0 | Status: SHIPPED | OUTPATIENT
Start: 2022-01-11 | End: 2022-03-02 | Stop reason: SDUPTHER

## 2022-03-04 ENCOUNTER — OFFICE VISIT (OUTPATIENT)
Dept: PAIN MEDICINE | Facility: HOSPITAL | Age: 52
End: 2022-03-04
Payer: COMMERCIAL

## 2022-03-04 VITALS
RESPIRATION RATE: 16 BRPM | HEART RATE: 80 BPM | DIASTOLIC BLOOD PRESSURE: 92 MMHG | OXYGEN SATURATION: 92 % | SYSTOLIC BLOOD PRESSURE: 137 MMHG

## 2022-03-04 DIAGNOSIS — R10.10 PAIN OF UPPER ABDOMEN: ICD-10-CM

## 2022-03-04 DIAGNOSIS — Z79.891 LONG-TERM CURRENT USE OF OPIATE ANALGESIC: Primary | ICD-10-CM

## 2022-03-04 DIAGNOSIS — S82.891D CLOSED FRACTURE OF RIGHT ANKLE WITH ROUTINE HEALING: ICD-10-CM

## 2022-03-04 DIAGNOSIS — R10.11 ABDOMINAL PAIN, CHRONIC, RIGHT UPPER QUADRANT: ICD-10-CM

## 2022-03-04 DIAGNOSIS — G89.29 ABDOMINAL PAIN, CHRONIC, RIGHT UPPER QUADRANT: ICD-10-CM

## 2022-03-04 PROCEDURE — G0463 HOSPITAL OUTPT CLINIC VISIT: HCPCS

## 2022-03-04 RX ORDER — TRAMADOL HYDROCHLORIDE 50 MG/1
50-100 TABLET ORAL EVERY 6 HOURS PRN
Qty: 240 TABLET | Refills: 0 | Status: SHIPPED | OUTPATIENT
Start: 2022-03-14 | End: 2022-06-02 | Stop reason: WASHOUT

## 2022-03-04 RX ORDER — TRAMADOL HYDROCHLORIDE 50 MG/1
50-100 TABLET ORAL EVERY 6 HOURS PRN
Qty: 240 TABLET | Refills: 0 | Status: SHIPPED | OUTPATIENT
Start: 2022-04-13 | End: 2022-06-02 | Stop reason: WASHOUT

## 2022-03-04 RX ORDER — TRAMADOL HYDROCHLORIDE 50 MG/1
50-100 TABLET ORAL EVERY 6 HOURS PRN
Qty: 240 TABLET | Refills: 0 | Status: SHIPPED | OUTPATIENT
Start: 2022-05-13 | End: 2023-05-24 | Stop reason: ALTCHOICE

## 2022-03-04 RX ORDER — GABAPENTIN 300 MG/1
600 CAPSULE ORAL 3 TIMES DAILY
Qty: 180 CAPSULE | Refills: 2 | Status: SHIPPED | OUTPATIENT
Start: 2022-03-04 | End: 2022-06-02 | Stop reason: SDUPTHER

## 2022-03-04 ASSESSMENT — PAIN DESCRIPTION - DESCRIPTORS: DESCRIPTORS: SHARP

## 2022-03-04 ASSESSMENT — ENCOUNTER SYMPTOMS: ABDOMINAL PAIN: 1

## 2022-03-04 ASSESSMENT — PAIN - FUNCTIONAL ASSESSMENT: PAIN_FUNCTIONAL_ASSESSMENT: 0-10

## 2022-03-04 NOTE — PROGRESS NOTES
"Patient ID: Ivonne Giron is a 51 y.o. female.    Pt presents today to evaluate medication review.    CHIEF COMPLAINT:  Patient reports pain is abdominal and right ankle  .     HPI:  Her chronic opioid management is tramadol 50 mg up to 8 per day, this is for her abdominal pain and sometimes it will come on suddenly and give her a lot of GI distress.    She is still having right ankle pain and swelling and admits its sensitive to touch and shoes and admits to temp changes.  She said \"I have to live with it\", she broke it last year and remained sensitive and now causing a lot of pain.     PAST PAIN HISTORY  Medications previously tried -bentyl makes her sleepy, butrans patch,hydrocodone post op   Other modalities completed -neurology for headaches  Past Injection List   TPI  ICNB    Pain Assessment: 0-10  Pain Score: 5 - Moderate pain (best 5, worst 8)  Pain Type: Chronic pain  Pain Location: Abdomen  Pain Radiating Towards: just across the stomach  Pain Descriptors: Sharp  Pain Frequency: Constant/continuous  Pain Onset: Awakened from sleep (every night)  Clinical Progression: Not changed  Aggravating Factors: Walking (move around too much)  Pain Interventions: Medication (See MAR), Home medication, Repositioned, Rest (limit activity)   /92 (BP Location: Right arm, Patient Position: Sitting)   Pulse 80   Resp 16   SpO2 92%       Current Outpatient Medications:   •  gabapentin (NEURONTIN) 300 mg capsule, TAKE 1 CAPSULE BY MOUTH THREE TIMES DAILY, Disp: 90 capsule, Rfl: 0  •  traMADoL (ULTRAM 50) 50 mg tablet, Take 1-2 tablets ( mg total) by mouth every 6 (six) hours as needed for pain scale 8-10/10 Max Daily Amount: 400 mg, Disp: 240 tablet, Rfl: 0  •  hydrOXYzine (ATARAX) 25 mg tablet, Take 12.5 mg by mouth 2 (two) times a day, Disp: , Rfl:   •  rimegepant (Nurtec ODT) 75 mg tablet,disintegrating, Take 75 mg by mouth daily as needed (migraine) Max 1 tab per 24 hours, Disp: 8 tablet, Rfl: 11  •  " butalbital-acetaminophen-caff (FIORICET, ESGIC) -40 mg, Take 1 tablet by mouth See administration instructions, Disp: , Rfl:   •  PARoxetine (PAXIL) 40 mg tablet, Take 40 mg by mouth every morning, Disp: , Rfl:   •  alendronate (FOSAMAX) 70 mg tablet, Take 70 mg by mouth once a week, Disp: , Rfl:   •  potassium chloride (KLOR-CON M20) 20 mEq CR tablet, Take 20 mEq by mouth daily, Disp: , Rfl: 1  •  M-Libia Plus 27 mg iron- 1 mg tablet, Take 1 tablet by mouth daily, Disp: , Rfl:   •  cetirizine (ZyrTEC) 10 mg tablet, Take 10 mg by mouth as needed, Disp: , Rfl:   •  atorvastatin (LIPITOR) 40 mg tablet, Take 40 mg by mouth nightly, Disp: , Rfl:   •  ibuprofen (ADVIL,MOTRIN) 200 mg tablet, Take 200 mg by mouth as needed.  , Disp: , Rfl:   •  prenatal 25-iron fum-folic-dha 30975-200 mg-mcg-mg capsule, Take 1 capsule by mouth daily., Disp: , Rfl:   •  calcium carbonate-vitamin D3 500 mg-10 mcg (400 unit) tablet, Take 1 tablet by mouth daily.  , Disp: , Rfl:   •  losartan (COZAAR) 100 mg tablet, Take by mouth daily.  , Disp: , Rfl:   Review of Systems   Gastrointestinal: Positive for abdominal pain.         Past Medical History:   Diagnosis Date   • Abdominal pain, chronic, generalized    • Accelerated essential hypertension    • Chronic pain disorder    • Intercostal neuropathic pain    • Migraine    • Pain     surgical sight pain since gallbladder surgery        Past Surgical History:   Procedure Laterality Date   • ANKLE SURGERY Right 2020   • CHOLECYSTECTOMY         Imaging:  No results found.    Physical Exam  Vitals and nursing note reviewed.   Constitutional:       General: She is not in acute distress.     Appearance: She is well-developed.   HENT:      Head: Normocephalic.   Eyes:      Conjunctiva/sclera: Conjunctivae normal.   Cardiovascular:      Rate and Rhythm: Normal rate.   Pulmonary:      Effort: Pulmonary effort is normal. No respiratory distress.   Abdominal:      General: There is no  distension.      Tenderness: There is no abdominal tenderness.   Musculoskeletal:         General: Tenderness present. No deformity.      Cervical back: Normal range of motion and neck supple.      Right lower leg: Swelling and tenderness present.        Legs:       Comments: No temp change to right ankle and limited ROM and swelling and mild color change     Gait fairly steady , limps on right foot slightly    No abd pain at rest today    Skin:     General: Skin is warm and dry.      Findings: No rash.   Neurological:      Mental Status: She is alert and oriented to person, place, and time.   Psychiatric:         Behavior: Behavior normal.         Thought Content: Thought content normal.         Judgment: Judgment normal.         ASSESSMENT    1. Long-term current use of opiate analgesic    2. Pain of upper abdomen    3. Closed fracture of right ankle with routine healing    4. Abdominal pain, chronic, right upper quadrant        PLAN    Medication Therapy: stay on tramadol 50 mg up to 8 per day and stay on gabapentin and increase it to 600 mg tid with taper  .  Injections: discussed right symph block I think she might have CRPS to her right ankle, she says its like pins and needles and very sensitive but its been over a year since the fracture  Referrals placed: none   Consider for future treatments: unsure we talked about her right ankle and education given on CRPS and symph block     PDMP  reviewed.  MME =40.  Narcannot done. Aberrant behavior = no. Affect/Mood = about the same. ADL = independent. Analgesia = adequate. Adverse Side Effects = no. Goals have been addressed and discussed with this patient.  The patient was advised that if at any time they feel oversedated to stop the medication and seek emergency care. UDS and contract 12/21      DISCUSSION  Today's plan was a combined effort between the patient and myself. The patient understood the plan, verbally consented, and agreed to participate. An After  Visit Summary with special instructions/information regarding today's office visit was given to the patient (see patient instructions).     The diagnostic assessment has been reviewed. Patient and/or patient's surrogate has expressed a good understanding of the assessment and recommendations from today's visit. There are no apparent barriers to understanding this information. Patient’s questions were addressed.  I have reviewed the patients current medications using all immediate resources available.     Patient has been advised to contact this office or the answering service with questions or concerns that may arise. Patient has been advised to follow up with Primary Care Provider for general medical needs.     A total of 20 minutes was required for this visit. Greater than 50% of this time was spent in direct face to face communication with the patient and/or surrogate in order to provide counseling regarding meds and her right ankle issues and options.    Dragon voice recognition program was used to aid in this documentation which might generate inaccuracies despite efforts made to avoid them.  Please take it into context and contact the provider with any questions.    Roberto Elliott, CNP

## 2022-03-04 NOTE — PATIENT INSTRUCTIONS
meds due 3/14, 4/13, 5/13    We will try an injection to help your ankle and continue with voltaren gel     Add 300 mg to one dose for 3 days and if tolerated add another 300 mg to another dose for 3 days and then go to 600 mg 3x/day    Patient Education     Complex Regional Pain Syndrome  Complex regional pain syndrome (CRPS) is a nerve disorder that causes long-term (chronic) pain. This is usually in a hand, arm, foot, or leg. CRPS usually occurs after an injury or trauma, such as a fracture or sprain.  There are two types of CRPS:  · Type 1. This type occurs after an injury with no known damage to a nerve.  · Type 2. This type occurs after an injury that damages a nerve.  There are three stages of the condition:  · Stage 1. This stage, called the acute stage, may last for up to 3 months.  · Stage 2. This stage, called the dystrophic stage, may last for 3-12 months.  · Stage 3. This stage, called the atrophic stage, may start after one year.  CRPS ranges from mild to severe. For most people, CRPS is mild and recovery happens over time. For others, CRPS lasts for a very long time and makes everyday tasks hard to do.  What are the causes?  The exact cause of this condition is not known. It is usually triggered by an injury.  What increases the risk?  You are more likely to develop this condition if:  · You are female.  · You have any of the following injuries:  ? A wrist fracture that involves a lower arm bone (distal radius fracture).  ? Ankle dislocation or fracture.  ? A long surgery time.  ? Possible nerve injury during surgery.  What are the signs or symptoms?  Signs and symptoms in the affected hand, arm, foot, or leg are different for each stage.  Signs and symptoms of stage 1 include:  · Burning pain.  · A prickling, tingling feeling (pins and needles sensation).  · Extremely sensitive skin.  · Swelling.  · Joint stiffness.  · Warmth and redness.  · Excessive sweating.  · Hair and nail growth that is faster  than normal.  Signs and symptoms of stage 2 include:  · Spreading of pain to the whole arm or leg.  · Increased skin sensitivity.  · Increased swelling and stiffness.  · Coolness of the skin.  · Blue discoloration of skin.  · Loss of skin wrinkles.  · Brittle fingernails.  Signs and symptoms of stage 3 include:  · Pain that spreads to other areas of the body but becomes less severe.  · More stiffness, leading to loss of motion.  · Skin that is pale, dry, shiny, or tightly stretched.  How is this diagnosed?  This condition may be diagnosed based on:  · Your signs and symptoms.   · A physical exam.  There is no test to diagnose CRPS, but you may have tests:  · To check for bone changes that might indicate CRPS. These tests may include an MRI or bone scan.  · To rule out other possible causes of your symptoms.  How is this treated?  Early treatment may prevent CRPS from advancing past stage 1. There is not one treatment that works for everyone. Treatment options may include:  · Medicines, which may include:  ? NSAIDs, such as ibuprofen.  ? Steroids.  ? Blood pressure drugs.  ? Antidepressants.  ? Anti-seizure drugs.  ? Pain relievers.  · Exercise.  · Occupational therapy (OT) and physical therapy (PT).  · Biofeedback.  · Mental health counseling.  · Numbing injections.  · Spinal surgery to implant a spinal cord stimulator or a pain pump.    Follow these instructions at home:  Medicines  · Take over-the-counter and prescription medicines only as told by your health care provider.  · Do not drive or use heavy machinery while taking prescription pain medicine.  · If you are taking prescription pain medicine, take actions to prevent or treat constipation. Your health care provider may recommend that you:  ? Drink enough fluid to keep your urine pale yellow.  ? Eat foods that are high in fiber, such as fresh fruits and vegetables, whole grains, and beans.  ? Limit foods that are high in fat and processed sugars, such as  fried or sweet foods.  ? Take an over-the-counter or prescription medicine for constipation.  General instructions  · Do not use any products that contain nicotine or tobacco, such as cigarettes and e-cigarettes. If you need help quitting, ask your health care provider.  · Maintain a healthy weight.  · Return to your normal activities as told by your health care provider. Ask your health care provider what activities are safe for you.  · Follow an exercise program as directed by your health care provider.  · Keep all follow-up visits as told by your health care provider. This is important.  Contact a health care provider if:  · Your symptoms change.  · Your symptoms get worse.  · You develop anxiety or depression.  Summary  · Complex regional pain syndrome (CRPS) is a nerve disorder that causes long-term (chronic) pain, usually in a hand, arm, leg, or foot.  · CRPS usually occurs after an injury or trauma, such as a fracture or sprain.  · CRPS ranges from mild to severe. Early treatment may prevent CRPS from advancing to more severe stages.  This information is not intended to replace advice given to you by your health care provider. Make sure you discuss any questions you have with your health care provider.  Document Revised: 09/30/2021 Document Reviewed: 09/30/2021  [employer] Patient Education © 2021 [employer]

## 2022-04-04 ENCOUNTER — HOSPITAL ENCOUNTER (OUTPATIENT)
Dept: FLUOROSCOPY | Facility: HOSPITAL | Age: 52
End: 2022-04-04
Payer: COMMERCIAL

## 2022-06-02 ENCOUNTER — OFFICE VISIT (OUTPATIENT)
Dept: PAIN MEDICINE | Facility: HOSPITAL | Age: 52
End: 2022-06-02
Payer: COMMERCIAL

## 2022-06-02 VITALS — OXYGEN SATURATION: 96 % | HEART RATE: 90 BPM | SYSTOLIC BLOOD PRESSURE: 136 MMHG | DIASTOLIC BLOOD PRESSURE: 93 MMHG

## 2022-06-02 DIAGNOSIS — Z79.891 LONG-TERM CURRENT USE OF OPIATE ANALGESIC: Primary | ICD-10-CM

## 2022-06-02 DIAGNOSIS — G89.29 ABDOMINAL PAIN, CHRONIC, RIGHT UPPER QUADRANT: ICD-10-CM

## 2022-06-02 DIAGNOSIS — R10.11 ABDOMINAL PAIN, CHRONIC, RIGHT UPPER QUADRANT: ICD-10-CM

## 2022-06-02 DIAGNOSIS — S82.891D CLOSED FRACTURE OF RIGHT ANKLE WITH ROUTINE HEALING: ICD-10-CM

## 2022-06-02 DIAGNOSIS — R10.10 PAIN OF UPPER ABDOMEN: ICD-10-CM

## 2022-06-02 PROCEDURE — G0463 HOSPITAL OUTPT CLINIC VISIT: HCPCS

## 2022-06-02 RX ORDER — TRAMADOL HYDROCHLORIDE 50 MG/1
50-100 TABLET ORAL EVERY 6 HOURS PRN
Qty: 240 TABLET | Refills: 0 | Status: SHIPPED | OUTPATIENT
Start: 2022-06-12 | End: 2023-05-24 | Stop reason: ALTCHOICE

## 2022-06-02 RX ORDER — GABAPENTIN 300 MG/1
300 CAPSULE ORAL 3 TIMES DAILY
Qty: 90 CAPSULE | Refills: 2 | Status: SHIPPED | OUTPATIENT
Start: 2022-06-02 | End: 2022-09-06

## 2022-06-02 RX ORDER — TRAMADOL HYDROCHLORIDE 50 MG/1
50-100 TABLET ORAL EVERY 6 HOURS PRN
Qty: 240 TABLET | Refills: 0 | Status: SHIPPED | OUTPATIENT
Start: 2022-08-11 | End: 2022-09-06

## 2022-06-02 RX ORDER — TRAMADOL HYDROCHLORIDE 50 MG/1
50-100 TABLET ORAL EVERY 6 HOURS PRN
Qty: 240 TABLET | Refills: 0 | Status: SHIPPED | OUTPATIENT
Start: 2022-07-12 | End: 2023-05-24 | Stop reason: ALTCHOICE

## 2022-06-02 ASSESSMENT — ENCOUNTER SYMPTOMS
JOINT SWELLING: 1
ARTHRALGIAS: 1
MYALGIAS: 1
ABDOMINAL PAIN: 1

## 2022-06-02 ASSESSMENT — PAIN - FUNCTIONAL ASSESSMENT: PAIN_FUNCTIONAL_ASSESSMENT: 0-10

## 2022-06-02 ASSESSMENT — PAIN DESCRIPTION - DESCRIPTORS: DESCRIPTORS: SHARP;TIGHTNESS

## 2022-06-02 NOTE — PROGRESS NOTES
"Patient ID: Ivonne Giron is a 51 y.o. female.    Pt presents today to evaluate medication review.    CHIEF COMPLAINT:  Patient reports pain is abdomen and right ankle    HPI:  Her chronic opioid management is tramadol 50 mg up to 8 a day and this helps her abdominal pain and she has various episodes of \"painful attacks\" on the abdomen.  We had her increase the gabapentin to 600 mg 3 times daily due to her issues with her right ankle sensitivity, this made her sleepy.  I wondered if she had CRPS to her right ankle and now she is seeing a specialist 6/7/22 as a \"screw is coming loose\" to the ankle. She has swelling and wears something tight on her foot to help with the pain.     She requests no changes to her chronic opioids and will stay on the lower dose of gabapentin and wait and see with the orthopedic specialist tells her about her right foot and ankle.      PAST PAIN HISTORY  Medications previously tried -bentyl makes her sleepy, butrans patch,hydrocodone post op   Other modalities completed -neurology for headaches  Past Injection List   TPI  ICNB  Pain Assessment: 0-10  Pain Score:  (Best 5; worst 10.)  Pain Type: Chronic pain  Pain Location: Abdomen  Pain Radiating Towards: throughout abdomen.  Pain Descriptors: Sharp, Tightness  Pain Frequency: Constant/continuous  Interference on Function: ADL's  Pain Onset: Awakened from sleep  Clinical Progression: Not changed  Aggravating Factors: Other (Comment), Bending (Movement)  Pain Interventions: Medication (See MAR), Home medication, Repositioned, Rest, Distraction, Cold applied   /93 (BP Location: Right arm, Patient Position: Sitting)   Pulse 90   SpO2 96%       Current Outpatient Medications:   •  gabapentin (NEURONTIN) 300 mg capsule, Take 2 capsules (600 mg total) by mouth 3 (three) times a day Add 300 mg to one dose for 3 days and if tolerated add another 300 mg for 3 days then do 600 mg 3x/day, Disp: 180 capsule, Rfl: 2  •  traMADoL (ULTRAM " 50) 50 mg tablet, Take 1-2 tablets ( mg total) by mouth every 6 (six) hours as needed for pain scale 8-10/10 Max Daily Amount: 400 mg, Disp: 240 tablet, Rfl: 0  •  hydrOXYzine (ATARAX) 25 mg tablet, Take 12.5 mg by mouth 2 (two) times a day, Disp: , Rfl:   •  rimegepant (Nurtec ODT) 75 mg tablet,disintegrating, Take 75 mg by mouth daily as needed (migraine) Max 1 tab per 24 hours, Disp: 8 tablet, Rfl: 11  •  butalbital-acetaminophen-caff (FIORICET, ESGIC) -40 mg, Take 1 tablet by mouth See administration instructions, Disp: , Rfl:   •  PARoxetine (PAXIL) 40 mg tablet, Take 40 mg by mouth every morning, Disp: , Rfl:   •  alendronate (FOSAMAX) 70 mg tablet, Take 70 mg by mouth once a week, Disp: , Rfl:   •  potassium chloride (KLOR-CON M20) 20 mEq CR tablet, Take 20 mEq by mouth daily, Disp: , Rfl: 1  •  M-Libia Plus 27 mg iron- 1 mg tablet, Take 1 tablet by mouth daily, Disp: , Rfl:   •  cetirizine (ZyrTEC) 10 mg tablet, Take 10 mg by mouth as needed, Disp: , Rfl:   •  atorvastatin (LIPITOR) 40 mg tablet, Take 40 mg by mouth nightly, Disp: , Rfl:   •  ibuprofen (ADVIL,MOTRIN) 200 mg tablet, Take 200 mg by mouth as needed.  , Disp: , Rfl:   •  prenatal 25-iron fum-folic-dha -200 mg-mcg-mg capsule, Take 1 capsule by mouth daily., Disp: , Rfl:   •  calcium carbonate-vitamin D3 500 mg-10 mcg (400 unit) tablet, Take 1 tablet by mouth daily.  , Disp: , Rfl:   •  losartan (COZAAR) 100 mg tablet, Take by mouth daily.  , Disp: , Rfl:   Review of Systems   Gastrointestinal: Positive for abdominal pain.   Musculoskeletal: Positive for arthralgias, joint swelling and myalgias.         Past Medical History:   Diagnosis Date   • Abdominal pain, chronic, generalized    • Accelerated essential hypertension    • Chronic pain disorder    • Intercostal neuropathic pain    • Migraine    • Pain     surgical sight pain since gallbladder surgery        Past Surgical History:   Procedure Laterality Date   • ANKLE SURGERY  Right 02/2020   • CHOLECYSTECTOMY         Imaging:  No results found.    Physical Exam  Vitals and nursing note reviewed.   Constitutional:       General: She is not in acute distress.     Appearance: She is well-developed.   HENT:      Head: Normocephalic.   Eyes:      Conjunctiva/sclera: Conjunctivae normal.   Cardiovascular:      Rate and Rhythm: Normal rate.   Pulmonary:      Effort: Pulmonary effort is normal. No respiratory distress.      Breath sounds: Normal breath sounds.   Abdominal:      General: There is no distension.      Tenderness: There is no abdominal tenderness.      Comments: Moves well in the room and no obvious abdominal pain during today's assessment.   Musculoskeletal:         General: Tenderness present. No deformity.      Cervical back: Normal range of motion and neck supple.      Right ankle: Swelling present. Tenderness present. Decreased range of motion.        Legs:       Comments: Mild swelling to the right ankle and foot and some purple discoloration, limps on her right foot slightly     Skin:     General: Skin is warm and dry.      Findings: No rash.   Neurological:      Mental Status: She is alert and oriented to person, place, and time.   Psychiatric:         Behavior: Behavior normal.         Thought Content: Thought content normal.         Judgment: Judgment normal.         ASSESSMENT    1. Long-term current use of opiate analgesic    2. Closed fracture of right ankle with routine healing    3. Abdominal pain, chronic, right upper quadrant        PLAN    Medication Therapy: tramadol #240 monthly and due dates discussed and keep gabapentin on old dosing, 300 mg 3 times daily.  Injections: none  Referrals placed: wait for ortho eval on right ankle and maybe do symph injection prior I will give her education on complex regional pain syndrome  Consider for future treatments: Unsure as she has been stable with her abdominal issues staying on this dose of tramadol and will call if she  needs any help with her ankle    PDMP reviewed.  MME = 40.  Narcan not done. Aberrant behavior = no. Affect/Mood = about the same. ADL = independent. Analgesia = adequate. Adverse Side Effects = no. Goals have been addressed and discussed with this patient.  The patient was advised that if at any time they feel oversedated to stop the medication and seek emergency care. UDS and contract December 2021      DISCUSSION  Today's plan was a combined effort between the patient and myself. The patient understood the plan, verbally consented, and agreed to participate. An After Visit Summary with special instructions/information regarding today's office visit was given to the patient (see patient instructions).     The diagnostic assessment has been reviewed. Patient and/or patient's surrogate has expressed a good understanding of the assessment and recommendations from today's visit. There are no apparent barriers to understanding this information. Patient’s questions were addressed.  I have reviewed the patients current medications using all immediate resources available.     Patient has been advised to contact this office or the answering service with questions or concerns that may arise. Patient has been advised to follow up with Primary Care Provider for general medical needs.     A total of 15 minutes was required for this visit. Greater than 50% of this time was spent in direct face to face communication with the patient and/or surrogate in order to provide counseling regarding her medications and ongoing issues with her ankle.    Dragon voice recognition program was used to aid in this documentation which might generate inaccuracies despite efforts made to avoid them.  Please take it into context and contact the provider with any questions.    Roberto Elliott, CNP

## 2022-06-20 ENCOUNTER — TELEPHONE (OUTPATIENT)
Dept: PAIN MEDICINE | Facility: HOSPITAL | Age: 52
End: 2022-06-20
Payer: COMMERCIAL

## 2022-06-20 ENCOUNTER — TELEPHONE (OUTPATIENT)
Dept: NEUROLOGY | Facility: CLINIC | Age: 52
End: 2022-06-20
Payer: COMMERCIAL

## 2022-06-20 NOTE — TELEPHONE ENCOUNTER
Ivonne called wanting you to know she had surgery on her ankle? And her surgeon is giving her some norco for post op pain.

## 2022-06-20 NOTE — TELEPHONE ENCOUNTER
Returning patients call set up f/u with Dr Avila called cell no answer no vm, called home a madi answered the phone stated patient went to [facility].

## 2022-07-12 ENCOUNTER — TELEPHONE (OUTPATIENT)
Dept: PAIN MEDICINE | Facility: HOSPITAL | Age: 52
End: 2022-07-12
Payer: COMMERCIAL

## 2022-07-12 NOTE — TELEPHONE ENCOUNTER
Prior Auth Approval for tramadol for dates unsure due to smudge in fax. [facility] notified of approval.

## 2022-07-15 ENCOUNTER — TELEPHONE (OUTPATIENT)
Dept: PAIN MEDICINE | Facility: HOSPITAL | Age: 52
End: 2022-07-15
Payer: COMMERCIAL

## 2022-07-15 RX ORDER — HYDROCODONE BITARTRATE AND ACETAMINOPHEN 5; 325 MG/1; MG/1
1 TABLET ORAL EVERY 8 HOURS PRN
COMMUNITY
Start: 2022-07-01 | End: 2023-05-24 | Stop reason: SDUPTHER

## 2022-07-15 NOTE — PROGRESS NOTES
The pharmacist from [facility] is concerned about patient needing tramadol on her due date due to filling norco post op with last pills 7/1 of hydrocodone and now realizing she took both meds (tramadol and norco) post op.     He been aware earlier by our staff that it was okay for the patient to fill but then I called him personally to tell him she still needs these medications filled on her due date due to using these for abdominal pain and I also think she is out of her hydrocodone now and needing opioid management to continue.  They wanted her to fill her meds on 7/24/2022 instead.    He is going to allow the refill today and wanted me to be aware of all the red flags of a different pharmacy and different medications but I am very well aware that she was having ankle issues and further surgery and she had called our office.

## 2022-07-15 NOTE — TELEPHONE ENCOUNTER
Ivonne called stating got Norco from surgeon and now having trouble getting Tramadol picked up.  Prior auth approved.  Attempt to call patient but VM not set up and did not receive answer to call.  Ivonne called back and stated there is problem with getting Tramadol.  Can you see if it is the daily amount with the Norco added.  I'm unsure what problem is but pharmacy told her she couldn't get it.

## 2022-07-15 NOTE — TELEPHONE ENCOUNTER
Called [facility] on [address].  They stated she got Hydrocodone x 2.  Checked PDMP.  Per the pharmacist, earliest she can get Tramadol is 7/24.

## 2022-08-13 DIAGNOSIS — Z79.891 LONG-TERM CURRENT USE OF OPIATE ANALGESIC: ICD-10-CM

## 2022-08-13 DIAGNOSIS — R10.11 ABDOMINAL PAIN, CHRONIC, RIGHT UPPER QUADRANT: ICD-10-CM

## 2022-08-13 DIAGNOSIS — G89.29 ABDOMINAL PAIN, CHRONIC, RIGHT UPPER QUADRANT: ICD-10-CM

## 2022-08-13 DIAGNOSIS — S82.891D CLOSED FRACTURE OF RIGHT ANKLE WITH ROUTINE HEALING: ICD-10-CM

## 2022-08-15 RX ORDER — TRAMADOL HYDROCHLORIDE 50 MG/1
TABLET ORAL
Qty: 240 TABLET | Refills: 0 | OUTPATIENT
Start: 2022-08-15

## 2022-09-05 DIAGNOSIS — R10.10 PAIN OF UPPER ABDOMEN: ICD-10-CM

## 2022-09-05 DIAGNOSIS — G89.29 ABDOMINAL PAIN, CHRONIC, RIGHT UPPER QUADRANT: ICD-10-CM

## 2022-09-05 DIAGNOSIS — Z79.891 LONG-TERM CURRENT USE OF OPIATE ANALGESIC: ICD-10-CM

## 2022-09-05 DIAGNOSIS — R10.11 ABDOMINAL PAIN, CHRONIC, RIGHT UPPER QUADRANT: ICD-10-CM

## 2022-09-05 DIAGNOSIS — S82.891D CLOSED FRACTURE OF RIGHT ANKLE WITH ROUTINE HEALING: ICD-10-CM

## 2022-09-06 RX ORDER — TRAMADOL HYDROCHLORIDE 50 MG/1
TABLET ORAL
Qty: 240 TABLET | Refills: 0 | Status: SHIPPED | OUTPATIENT
Start: 2022-09-12 | End: 2023-06-14 | Stop reason: SDUPTHER

## 2022-09-06 RX ORDER — GABAPENTIN 300 MG/1
CAPSULE ORAL
Qty: 90 CAPSULE | Refills: 0 | Status: SHIPPED | OUTPATIENT
Start: 2022-09-06 | End: 2022-10-04 | Stop reason: SDUPTHER

## 2022-09-06 NOTE — TELEPHONE ENCOUNTER
Pt/Pharmacy Refill request for gabapentin and Tramadol. Per PDMP last fill 8/13/22. Last appt 9/1/22 a late cancel. Next appt 10/6/22. Last written: 8/13/22 with no refills.

## 2022-10-06 ENCOUNTER — OFFICE VISIT (OUTPATIENT)
Dept: PAIN MEDICINE | Facility: HOSPITAL | Age: 52
End: 2022-10-06
Payer: COMMERCIAL

## 2022-10-06 VITALS — SYSTOLIC BLOOD PRESSURE: 117 MMHG | HEART RATE: 89 BPM | DIASTOLIC BLOOD PRESSURE: 73 MMHG | OXYGEN SATURATION: 92 %

## 2022-10-06 DIAGNOSIS — S82.891D CLOSED FRACTURE OF RIGHT ANKLE WITH ROUTINE HEALING: ICD-10-CM

## 2022-10-06 DIAGNOSIS — Z79.891 LONG-TERM CURRENT USE OF OPIATE ANALGESIC: Primary | ICD-10-CM

## 2022-10-06 DIAGNOSIS — R10.10 PAIN OF UPPER ABDOMEN: ICD-10-CM

## 2022-10-06 PROCEDURE — G0463 HOSPITAL OUTPT CLINIC VISIT: HCPCS

## 2022-10-06 RX ORDER — TRAMADOL HYDROCHLORIDE 50 MG/1
50-100 TABLET ORAL EVERY 6 HOURS PRN
Qty: 240 TABLET | Refills: 0 | Status: SHIPPED | OUTPATIENT
Start: 2022-12-11 | End: 2023-05-24 | Stop reason: ALTCHOICE

## 2022-10-06 RX ORDER — GABAPENTIN 300 MG/1
300 CAPSULE ORAL 3 TIMES DAILY
Qty: 90 CAPSULE | Refills: 2 | Status: SHIPPED | OUTPATIENT
Start: 2022-10-06 | End: 2022-12-29 | Stop reason: SDUPTHER

## 2022-10-06 RX ORDER — TRAMADOL HYDROCHLORIDE 50 MG/1
50-100 TABLET ORAL EVERY 6 HOURS PRN
Qty: 240 TABLET | Refills: 0 | Status: SHIPPED | OUTPATIENT
Start: 2022-11-11 | End: 2022-11-10 | Stop reason: SDUPTHER

## 2022-10-06 RX ORDER — TRAMADOL HYDROCHLORIDE 50 MG/1
50-100 TABLET ORAL EVERY 6 HOURS PRN
Qty: 240 TABLET | Refills: 0 | Status: SHIPPED | OUTPATIENT
Start: 2022-10-12 | End: 2023-05-24 | Stop reason: ALTCHOICE

## 2022-10-06 ASSESSMENT — ENCOUNTER SYMPTOMS
ABDOMINAL PAIN: 1
JOINT SWELLING: 1
ARTHRALGIAS: 1

## 2022-10-06 ASSESSMENT — PAIN DESCRIPTION - DESCRIPTORS: DESCRIPTORS: SHARP;STABBING;DISCOMFORT

## 2022-10-06 ASSESSMENT — PAIN - FUNCTIONAL ASSESSMENT: PAIN_FUNCTIONAL_ASSESSMENT: 0-10

## 2022-10-06 NOTE — PROGRESS NOTES
"Patient ID: Ivonne Giron is a 51 y.o. female.    Pt presents today to evaluate medication review.    CHIEF COMPLAINT:  Patient reports pain is abdomen.     HPI:  Her chronic opioid management is tramadol 50 mg up to 8 a day and this helps her abdominal pain and she has various episodes of \"painful attacks\" on the abdomen.  We had her increase the gabapentin to 600 mg 3 times daily due to her issues with her right ankle sensitivity, this made her sleepy.      She had the hardware out on her right ankle this summer and she still has some swelling and still wearing boots that cover up the ankle.  She still limps on it somewhat and she said it swells if not under any compression.        She requests no changes to her chronic opioid, they continue to help her and keep her active.          PAST PAIN HISTORY  Medications previously tried -bentyl makes her sleepy, butrans patch,hydrocodone post op   Other modalities completed -neurology for headaches  Past Injection List   TPI  ICNB      Pain Assessment: 0-10  Pain Score: 6 (best 5 worst 10)  Pain Type: Chronic pain  Pain Location: Abdomen  Pain Radiating Towards: radiates to right and left of abdomen  Pain Descriptors: Sharp, Stabbing, Discomfort  Pain Frequency: Constant/continuous  Interference on Function: ADL'S  Pain Onset: Ongoing  Clinical Progression: Not changed  Aggravating Factors: Standing, Other (Comment) (patient states moving to much it hurts)  Pain Interventions: Medication (See MAR), Rest, Repositioned  Response to Interventions: meds and don't move at all     /73 (BP Location: Right arm, Patient Position: Sitting)   Pulse 89   SpO2 92%       Current Outpatient Medications:     traMADoL (ULTRAM 50) 50 mg tablet, TAKE 1 TO 2 TABLETS BY MOUTH EVERY 6 HOURS AS NEEDED FOR PAIN . DO NOT EXCEED 8 PER 24 HOURS (SCALE  8-10/10), Disp: 240 tablet, Rfl: 0    HYDROcodone-acetaminophen (NORCO) 5-325 mg per tablet, Take 1 tablet by mouth every 8 (eight) " hours as needed, Disp: , Rfl:     hydrOXYzine (ATARAX) 25 mg tablet, Take 12.5 mg by mouth 2 (two) times a day, Disp: , Rfl:     rimegepant (Nurtec ODT) 75 mg tablet,disintegrating, Take 75 mg by mouth daily as needed (migraine) Max 1 tab per 24 hours, Disp: 8 tablet, Rfl: 11    butalbital-acetaminophen-caff (FIORICET, ESGIC) -40 mg, Take 1 tablet by mouth See administration instructions, Disp: , Rfl:     PARoxetine (PAXIL) 40 mg tablet, Take 40 mg by mouth every morning, Disp: , Rfl:     alendronate (FOSAMAX) 70 mg tablet, Take 70 mg by mouth once a week, Disp: , Rfl:     potassium chloride (KLOR-CON M20) 20 mEq CR tablet, Take 20 mEq by mouth daily, Disp: , Rfl: 1    M-Libia Plus 27 mg iron- 1 mg tablet, Take 1 tablet by mouth daily, Disp: , Rfl:     cetirizine (ZyrTEC) 10 mg tablet, Take 10 mg by mouth as needed, Disp: , Rfl:     atorvastatin (LIPITOR) 40 mg tablet, Take 40 mg by mouth nightly, Disp: , Rfl:     ibuprofen (ADVIL,MOTRIN) 200 mg tablet, Take 200 mg by mouth as needed.  , Disp: , Rfl:     prenatal 25-iron fum-folic-dha -200 mg-mcg-mg capsule, Take 1 capsule by mouth daily., Disp: , Rfl:     calcium carbonate-vitamin D3 500 mg-10 mcg (400 unit) tablet, Take 1 tablet by mouth daily.  , Disp: , Rfl:     losartan (COZAAR) 100 mg tablet, Take by mouth daily.  , Disp: , Rfl:     gabapentin (NEURONTIN) 300 mg capsule, Take 1 capsule (300 mg total) by mouth 3 (three) times a day, Disp: 90 capsule, Rfl: 2    [START ON 10/12/2022] traMADoL (ULTRAM 50) 50 mg tablet, Take 1-2 tablets ( mg total) by mouth every 6 (six) hours as needed for pain scale 8-10/10 Max Daily Amount: 400 mg, Disp: 240 tablet, Rfl: 0    [START ON 2022] traMADoL (ULTRAM 50) 50 mg tablet, Take 1-2 tablets ( mg total) by mouth every 6 (six) hours as needed for pain scale 8-10/10 Max Daily Amount: 400 mg, Disp: 240 tablet, Rfl: 0    [START ON 2022] traMADoL (ULTRAM 50) 50 mg tablet, Take 1-2 tablets (  mg total) by mouth every 6 (six) hours as needed for pain scale 8-10/10 Max Daily Amount: 400 mg, Disp: 240 tablet, Rfl: 0    traMADoL (ULTRAM 50) 50 mg tablet, Take 1-2 tablets ( mg total) by mouth every 6 (six) hours as needed for pain scale 8-10/10 Max Daily Amount: 400 mg, Disp: 240 tablet, Rfl: 0    traMADoL (ULTRAM 50) 50 mg tablet, Take 1-2 tablets ( mg total) by mouth every 6 (six) hours as needed for pain scale 8-10/10 Max Daily Amount: 400 mg, Disp: 240 tablet, Rfl: 0    traMADoL (ULTRAM 50) 50 mg tablet, Take 1-2 tablets ( mg total) by mouth every 6 (six) hours as needed for pain scale 8-10/10 Max Daily Amount: 400 mg, Disp: 240 tablet, Rfl: 0  Review of Systems   Gastrointestinal:  Positive for abdominal pain (intermittent).   Musculoskeletal:  Positive for arthralgias and joint swelling.       Past Medical History:   Diagnosis Date    Abdominal pain, chronic, generalized     Accelerated essential hypertension     Chronic pain disorder     Intercostal neuropathic pain     Migraine     Pain     surgical sight pain since gallbladder surgery        Past Surgical History:   Procedure Laterality Date    ANKLE SURGERY Right 02/2020    CHOLECYSTECTOMY         Imaging:  No results found.    Physical Exam  Vitals and nursing note reviewed.   Constitutional:       General: She is not in acute distress.     Appearance: She is well-developed.   HENT:      Head: Normocephalic.   Eyes:      Conjunctiva/sclera: Conjunctivae normal.   Cardiovascular:      Rate and Rhythm: Normal rate.   Pulmonary:      Effort: Pulmonary effort is normal. No respiratory distress.      Breath sounds: Normal breath sounds.   Abdominal:      General: There is no distension.      Tenderness: There is no abdominal tenderness.          Comments: Moves well in the room and no obvious abdominal pain during today's assessment.   Musculoskeletal:         General: Tenderness present. No deformity.      Cervical back: Normal range  of motion and neck supple.      Right ankle: No swelling (has a boot on). No tenderness. Decreased range of motion.        Legs:       Comments: Gait is fairly steady and limps somewhat on the right ankle and has a shoe on and no obvious swelling    No obvious abdominal distention or pain today     Skin:     General: Skin is warm and dry.      Findings: No rash.   Neurological:      Mental Status: She is alert and oriented to person, place, and time.   Psychiatric:         Behavior: Behavior normal.         Thought Content: Thought content normal.         Judgment: Judgment normal.       ASSESSMENT    1. Long-term current use of opiate analgesic    2. Pain of upper abdomen    3. Closed fracture of right ankle with routine healing        PLAN    Medication Therapy: Stay on the tramadol up to 8/day and prescription sent locally and discussed we can refills, stay on lower gabapentin  Injections: None  Referrals placed: None  Consider for future treatments: Unsure she has been on the same treatment for many years, hopefully her ankle will not have any other issues    PDMP reviewed.  MME = 40.  Narcan not done. Aberrant behavior = no. Affect/Mood = about the same. ADL = independent. Analgesia = adequate. Adverse Side Effects = no. Goals have been addressed and discussed with this patient.  The patient was advised that if at any time they feel oversedated to stop the medication and seek emergency care, I have counseled the patient regarding safe use of their opioids.. UDS and contract December 2021    DISCUSSION  Today's plan was a combined effort between the patient and myself. The patient understood the plan, verbally consented, and agreed to participate. An After Visit Summary with special instructions/information regarding today's office visit was given to the patient (see patient instructions).     The diagnostic assessment has been reviewed. Patient and/or patient's surrogate has expressed a good understanding of  the assessment and recommendations from today's visit. There are no apparent barriers to understanding this information. Patient’s questions were addressed.  I have reviewed the patients current medications using all immediate resources available.     Patient has been advised to contact this office or the answering service with questions or concerns that may arise. Patient has been advised to follow up with Primary Care Provider for general medical needs.     A total of 15 minutes was required for this visit. Greater than 50% of this time was spent in direct face to face communication with the patient and/or surrogate in order to provide counseling regarding med management and right ankle pain.    Dragon voice recognition program was used to aid in this documentation which might generate inaccuracies despite efforts made to avoid them.  Please take it into context and contact the provider with any questions.    Roberto Elliott, CNP

## 2022-10-12 ENCOUNTER — TELEPHONE (OUTPATIENT)
Dept: PAIN MEDICINE | Facility: HOSPITAL | Age: 52
End: 2022-10-12
Payer: COMMERCIAL

## 2022-10-12 NOTE — TELEPHONE ENCOUNTER
Grazyna from [facility] calls to inquire re an MRI and medicaid referral card. Per Earl note 10/6/22 no imaging ordered. She will follow up with patient.

## 2022-11-10 DIAGNOSIS — S82.891D CLOSED FRACTURE OF RIGHT ANKLE WITH ROUTINE HEALING: ICD-10-CM

## 2022-11-10 DIAGNOSIS — R10.10 PAIN OF UPPER ABDOMEN: ICD-10-CM

## 2022-11-10 DIAGNOSIS — Z79.891 LONG-TERM CURRENT USE OF OPIATE ANALGESIC: ICD-10-CM

## 2022-11-10 RX ORDER — TRAMADOL HYDROCHLORIDE 50 MG/1
50-100 TABLET ORAL EVERY 6 HOURS PRN
Qty: 240 TABLET | Refills: 0 | Status: SHIPPED | OUTPATIENT
Start: 2022-11-11 | End: 2022-12-29 | Stop reason: SDUPTHER

## 2022-11-10 NOTE — TELEPHONE ENCOUNTER
Ivonne called requesting to fill her tramadol today because she will not have a ride to the pharmacy tomorrow.

## 2022-12-29 DIAGNOSIS — S82.891D CLOSED FRACTURE OF RIGHT ANKLE WITH ROUTINE HEALING: ICD-10-CM

## 2022-12-29 DIAGNOSIS — Z79.891 LONG-TERM CURRENT USE OF OPIATE ANALGESIC: ICD-10-CM

## 2022-12-29 DIAGNOSIS — R10.10 PAIN OF UPPER ABDOMEN: ICD-10-CM

## 2022-12-29 RX ORDER — GABAPENTIN 300 MG/1
300 CAPSULE ORAL 3 TIMES DAILY
Qty: 90 CAPSULE | Refills: 2 | Status: SHIPPED | OUTPATIENT
Start: 2023-01-08 | End: 2023-02-10 | Stop reason: ALTCHOICE

## 2022-12-29 RX ORDER — TRAMADOL HYDROCHLORIDE 50 MG/1
50-100 TABLET ORAL EVERY 6 HOURS PRN
Qty: 240 TABLET | Refills: 0 | Status: SHIPPED | OUTPATIENT
Start: 2023-01-08 | End: 2023-01-20 | Stop reason: SDUPTHER

## 2022-12-29 NOTE — TELEPHONE ENCOUNTER
Patient notified that she was running late for 12/29/2022 appt, once arrived there was an error with the check in process downstairs. Unfortunately, unable to fit Patient into schedule.  After consulting with Roberto Elliott CNP, she suggests sending January prescriptions in and reschedule Med Review in February.    Pt/Pharmacy Refill request for Tramadol. Per PDMP last fill 12/9/2022. Last appt 10/6/2022. Next appt 2/10/2023. Last written: 10/6 with 0 refills.

## 2023-01-20 DIAGNOSIS — Z79.891 LONG-TERM CURRENT USE OF OPIATE ANALGESIC: ICD-10-CM

## 2023-01-20 DIAGNOSIS — S82.891D CLOSED FRACTURE OF RIGHT ANKLE WITH ROUTINE HEALING: ICD-10-CM

## 2023-01-20 DIAGNOSIS — R10.10 PAIN OF UPPER ABDOMEN: ICD-10-CM

## 2023-01-20 RX ORDER — TRAMADOL HYDROCHLORIDE 50 MG/1
50-100 TABLET ORAL EVERY 6 HOURS PRN
Qty: 240 TABLET | Refills: 0 | Status: SHIPPED | OUTPATIENT
Start: 2023-02-07 | End: 2023-05-24 | Stop reason: ALTCHOICE

## 2023-01-20 NOTE — TELEPHONE ENCOUNTER
Pt calls reporting she cannot  her prescriptions. Confirmed with pharmacy she can  her gabapentin tomorrow. She would like her tramadol prescription due to fill 2/7/23. Her next appt is 2/10/23. Per PDMP last fill 1/8/2023

## 2023-02-09 NOTE — PROGRESS NOTES
"Patient ID: Ivonne Giron is a 52 y.o. female.    Pt presents today to evaluate medication review.    CHIEF COMPLAINT:  Patient reports pain is abdomen.     HPI:  Her chronic opioid management is tramadol 50 mg up to 8 a day and she takes this 2 to various episodes of \"painful attacks\" on the abdomen.  She is having more episodes of abdominal pain and she thinks she is still getting the same relief but worried about having more episodes.  She is staying home more thinking that she will have an episode as she is not able to control them well if she is out in public.  She gets really shaky and sweaty.  She does not know what triggers them and it is suddenly intense and sharp to middle of abdomen and then she gets sweaty and at times nausea and vomiting.  She has not seen GI since 2017.  At that visit she had an MRCP that did not show anything.  She is no longer taking Bentyl, in the past it made her sleepy.  When we increased her gabapentin she got sleepy.    She had the hardware out on her right ankle this summer and she still has some swelling and still wearing boots that cover up the ankle.  She still limps on it somewhat and she said it swells if not under any compression.   She likes compression to her foot and it helps with the pain.     PAST PAIN HISTORY  Medications previously tried -bentyl makes her sleepy, butrans patch,hydrocodone post op   Other modalities completed -neurology for headaches  Past Injection List   TPI  ICNB      Pain Score: 5 - Moderate pain  Pain Type: Chronic pain  Pain Location: Abdomen  Pain Radiating Towards: arcoss both sides of body  Pain Descriptors: Sharp  Pain Frequency: Constant/continuous  Interference on Function: adl's  Pain Onset: Awakened from sleep  Clinical Progression: Not changed  Aggravating Factors: Walking (to much moving)  Pain Interventions: Medication (See MAR), Heat applied, Other (Comment) (tylenol and motrin)  Response to Interventions: not moving and " taking her meds       /95 (BP Location: Right arm, Patient Position: Sitting, Cuff Size: Regular Adult)   Pulse 77   SpO2 95%       Current Outpatient Medications:     traMADoL (ULTRAM 50) 50 mg tablet, Take 1-2 tablets ( mg total) by mouth every 6 (six) hours as needed for pain scale 8-10/10 Max Daily Amount: 400 mg, Disp: 240 tablet, Rfl: 0    traMADoL (ULTRAM 50) 50 mg tablet, TAKE 1 TO 2 TABLETS BY MOUTH EVERY 6 HOURS AS NEEDED FOR PAIN . DO NOT EXCEED 8 PER 24 HOURS (SCALE  8-10/10), Disp: 240 tablet, Rfl: 0    HYDROcodone-acetaminophen (NORCO) 5-325 mg per tablet, Take 1 tablet by mouth every 8 (eight) hours as needed, Disp: , Rfl:     hydrOXYzine (ATARAX) 25 mg tablet, Take 12.5 mg by mouth 2 (two) times a day, Disp: , Rfl:     rimegepant (Nurtec ODT) 75 mg tablet,disintegrating, Take 75 mg by mouth daily as needed (migraine) Max 1 tab per 24 hours, Disp: 8 tablet, Rfl: 11    butalbital-acetaminophen-caff (FIORICET, ESGIC) -40 mg, Take 1 tablet by mouth See administration instructions, Disp: , Rfl:     PARoxetine (PAXIL) 40 mg tablet, Take 40 mg by mouth every morning, Disp: , Rfl:     alendronate (FOSAMAX) 70 mg tablet, Take 70 mg by mouth once a week, Disp: , Rfl:     potassium chloride (KLOR-CON M20) 20 mEq CR tablet, Take 20 mEq by mouth daily, Disp: , Rfl: 1    M- Plus 27 mg iron- 1 mg tablet, Take 1 tablet by mouth daily, Disp: , Rfl:     cetirizine (ZyrTEC) 10 mg tablet, Take 10 mg by mouth as needed, Disp: , Rfl:     atorvastatin (LIPITOR) 40 mg tablet, Take 40 mg by mouth nightly, Disp: , Rfl:     ibuprofen (ADVIL,MOTRIN) 200 mg tablet, Take 200 mg by mouth as needed.  , Disp: , Rfl:     prenatal 25-iron fum-folic-dha -200 mg-mcg-mg capsule, Take 1 capsule by mouth daily., Disp: , Rfl:     calcium carbonate-vitamin D3 500 mg-10 mcg (400 unit) tablet, Take 1 tablet by mouth daily.  , Disp: , Rfl:     losartan (COZAAR) 100 mg tablet, Take by mouth daily.  , Disp: , Rfl:      pregabalin (Lyrica) 50 mg capsule, Take 1 capsule (50 mg total) by mouth 2 (two) times a day Max Daily Amount: 100 mg, Disp: 60 capsule, Rfl: 0    ondansetron ODT (ZOFRAN-ODT) 4 mg disintegrating tablet, Take 1 tablet (4 mg total) by mouth every 8 (eight) hours as needed for nausea or vomiting for up to 7 days, Disp: 20 tablet, Rfl: 0    dicyclomine (BENTYL) 10 mg capsule, Take 1 capsule (10 mg total) by mouth 4 (four) times a day (before meals and nightly) Take as needed with meals or in evening for abdominal pain, Disp: 120 capsule, Rfl: 2    [START ON 3/7/2023] traMADol ER (ULTRAM-ER) 200 mg 24 hr tablet, Take 1 tablet (200 mg total) by mouth daily for 28 days Max Daily Amount: 200 mg, Disp: 28 tablet, Rfl: 0    [START ON 3/7/2023] HYDROcodone-acetaminophen (NORCO) 5-325 mg per tablet, Take 1 tablet by mouth every 6 (six) hours as needed (pain) for up to 28 days Max Daily Amount: 4 tablets, Disp: 112 tablet, Rfl: 0    traMADoL (ULTRAM 50) 50 mg tablet, Take 1-2 tablets ( mg total) by mouth every 6 (six) hours as needed for pain scale 8-10/10 Max Daily Amount: 400 mg, Disp: 240 tablet, Rfl: 0    traMADoL (ULTRAM 50) 50 mg tablet, Take 1-2 tablets ( mg total) by mouth every 6 (six) hours as needed for pain scale 8-10/10 Max Daily Amount: 400 mg, Disp: 240 tablet, Rfl: 0    traMADoL (ULTRAM 50) 50 mg tablet, Take 1-2 tablets ( mg total) by mouth every 6 (six) hours as needed for pain scale 8-10/10 Max Daily Amount: 400 mg, Disp: 240 tablet, Rfl: 0    traMADoL (ULTRAM 50) 50 mg tablet, Take 1-2 tablets ( mg total) by mouth every 6 (six) hours as needed for pain scale 8-10/10 Max Daily Amount: 400 mg, Disp: 240 tablet, Rfl: 0    traMADoL (ULTRAM 50) 50 mg tablet, Take 1-2 tablets ( mg total) by mouth every 6 (six) hours as needed for pain scale 8-10/10 Max Daily Amount: 400 mg, Disp: 240 tablet, Rfl: 0  Review of Systems   Gastrointestinal:  Positive for abdominal pain (intermittent).    Musculoskeletal:  Positive for arthralgias and joint swelling.       Past Medical History:   Diagnosis Date    Abdominal pain, chronic, generalized     Accelerated essential hypertension     Chronic pain disorder     Intercostal neuropathic pain     Migraine     Pain     surgical sight pain since gallbladder surgery        Past Surgical History:   Procedure Laterality Date    ANKLE SURGERY Right 02/2020    CHOLECYSTECTOMY         Imaging:  No results found.    Physical Exam  Vitals and nursing note reviewed.   Constitutional:       General: She is not in acute distress.     Appearance: She is well-developed.   HENT:      Head: Normocephalic.   Eyes:      Conjunctiva/sclera: Conjunctivae normal.   Cardiovascular:      Rate and Rhythm: Normal rate.   Pulmonary:      Effort: Pulmonary effort is normal. No respiratory distress.      Breath sounds: Normal breath sounds.   Abdominal:      General: There is no distension.      Tenderness: There is no abdominal tenderness. There is no guarding or rebound.          Comments: Moves well in the room and no obvious abdominal pain during today's assessment.   Musculoskeletal:         General: Tenderness present. No deformity.      Cervical back: Normal range of motion and neck supple.      Right ankle: No swelling (has a boot on). No tenderness. Decreased range of motion.        Legs:       Comments: Gait is fairly steady and limps somewhat on the right ankle and has a shoe on and no obvious swelling    No obvious abdominal distention or pain today     Skin:     General: Skin is warm and dry.      Findings: No rash.   Neurological:      Mental Status: She is alert and oriented to person, place, and time.   Psychiatric:         Behavior: Behavior normal.         Thought Content: Thought content normal.         Judgment: Judgment normal.       ASSESSMENT    1. Encounter for long-term opiate analgesic use    2. Chronic pain disorder    3. Closed fracture of right ankle with  routine healing    4. Abdominal pain, chronic, right upper quadrant    5. Long-term current use of opiate analgesic        PLAN    Medication Therapy: She just picked up her tramadol 50 mg few days ago so she will continue taking that for the next month and in the meantime stop gabapentin and try Lyrica 50 mg twice daily.  She took a dose of gabapentin this morning so I will have her take Lyrica tonight.  I will have her continue to carry Bentyl and maybe take half a pill when she starts to have one of her attacks.  We will also have her try some Zofran ODT during an attack.  When she is ready for her next refill on March 7, 2023 I want her to take 200 mg ER tramadol and then try to use hydrocodone for breakthrough pain possibly during an episode if able.  I like to reduce her pill burden as she has 8 pills a day and perhaps having a long-acting will prevent as many spells or attacks  Injections: None for now as she is not acutely in pain and I am not sure how she gets an episode  Referrals placed: Have her go back to GI and see if she needs a scope or see if they can catch her with pain and determine the cause  Consider for future treatments: Unsure as she shows up to these appointments without acute pain except for one time I did see or have one of her attacks or spells during our exam in the last 5 to 10 minutes and I think she has been worked up for seizure in the past but I also want to keep her low on the tramadol in case of seizure disorder.  We will follow-up in April and see how these medication changes help her.  She still having some issues with her ankle but trying to stay active    PDMP reviewed.  MME = 40.  Narcan not done. Aberrant behavior = no. Affect/Mood = about the same. ADL = independent. Analgesia = inadequate. Adverse Side Effects = no. Goals have been addressed and discussed with this patient.  The patient was advised that if at any time they feel oversedated to stop the medication and seek  emergency care, I have counseled the patient regarding safe use of their opioids.. UDS and contract done today    DISCUSSION  Today's plan was a combined effort between the patient and myself. The patient understood the plan, verbally consented, and agreed to participate. An After Visit Summary with special instructions/information regarding today's office visit was given to the patient (see patient instructions).     The diagnostic assessment has been reviewed. Patient and/or patient's surrogate has expressed a good understanding of the assessment and recommendations from today's visit. There are no apparent barriers to understanding this information. Patient’s questions were addressed.  I have reviewed the patients current medications using all immediate resources available.     Patient has been advised to contact this office or the answering service with questions or concerns that may arise. Patient has been advised to follow up with Primary Care Provider for general medical needs.     A total of 20 minutes was required for this visit. Greater than 50% of this time was spent in direct face to face communication with the patient and/or surrogate in order to provide counseling regarding discussing new regimens and looking at her old referrals and briefly talking about her ankle pain..    Dragon voice recognition program was used to aid in this documentation which might generate inaccuracies despite efforts made to avoid them.  Please take it into context and contact the provider with any questions.    Roberto Elliott, CNP

## 2023-02-10 ENCOUNTER — TELEPHONE (OUTPATIENT)
Dept: PAIN MEDICINE | Facility: HOSPITAL | Age: 53
End: 2023-02-10
Payer: COMMERCIAL

## 2023-02-10 ENCOUNTER — OFFICE VISIT (OUTPATIENT)
Dept: PAIN MEDICINE | Facility: HOSPITAL | Age: 53
End: 2023-02-10
Payer: COMMERCIAL

## 2023-02-10 VITALS — OXYGEN SATURATION: 95 % | HEART RATE: 77 BPM | SYSTOLIC BLOOD PRESSURE: 139 MMHG | DIASTOLIC BLOOD PRESSURE: 95 MMHG

## 2023-02-10 DIAGNOSIS — Z79.891 ENCOUNTER FOR LONG-TERM OPIATE ANALGESIC USE: Primary | ICD-10-CM

## 2023-02-10 DIAGNOSIS — R10.11 ABDOMINAL PAIN, CHRONIC, RIGHT UPPER QUADRANT: ICD-10-CM

## 2023-02-10 DIAGNOSIS — G89.29 ABDOMINAL PAIN, CHRONIC, RIGHT UPPER QUADRANT: ICD-10-CM

## 2023-02-10 DIAGNOSIS — S82.891D CLOSED FRACTURE OF RIGHT ANKLE WITH ROUTINE HEALING: ICD-10-CM

## 2023-02-10 DIAGNOSIS — Z79.891 LONG-TERM CURRENT USE OF OPIATE ANALGESIC: ICD-10-CM

## 2023-02-10 DIAGNOSIS — G89.4 CHRONIC PAIN DISORDER: ICD-10-CM

## 2023-02-10 PROCEDURE — G0481 DRUG TEST DEF 8-14 CLASSES: HCPCS | Performed by: NURSE PRACTITIONER

## 2023-02-10 PROCEDURE — G0463 HOSPITAL OUTPT CLINIC VISIT: HCPCS

## 2023-02-10 PROCEDURE — 80320 DRUG SCREEN QUANTALCOHOLS: CPT | Performed by: NURSE PRACTITIONER

## 2023-02-10 PROCEDURE — 80326 AMPHETAMINES 5 OR MORE: CPT | Performed by: NURSE PRACTITIONER

## 2023-02-10 PROCEDURE — 80355 GABAPENTIN NON-BLOOD: CPT | Performed by: NURSE PRACTITIONER

## 2023-02-10 PROCEDURE — 80345 DRUG SCREENING BARBITURATES: CPT | Performed by: NURSE PRACTITIONER

## 2023-02-10 PROCEDURE — G0480 DRUG TEST DEF 1-7 CLASSES: HCPCS | Performed by: NURSE PRACTITIONER

## 2023-02-10 PROCEDURE — 80349 CANNABINOIDS NATURAL: CPT | Performed by: NURSE PRACTITIONER

## 2023-02-10 PROCEDURE — 99001 SPECIMEN HANDLING PT-LAB: CPT

## 2023-02-10 PROCEDURE — 80339 ANTIEPILEPTICS NOS 1-3: CPT | Performed by: NURSE PRACTITIONER

## 2023-02-10 PROCEDURE — 80364 OPIOID &OPIATE ANALOG 5/MORE: CPT | Performed by: NURSE PRACTITIONER

## 2023-02-10 PROCEDURE — 80353 DRUG SCREENING COCAINE: CPT | Performed by: NURSE PRACTITIONER

## 2023-02-10 PROCEDURE — 80347 BENZODIAZEPINES 13 OR MORE: CPT | Performed by: NURSE PRACTITIONER

## 2023-02-10 RX ORDER — TRAMADOL HYDROCHLORIDE 200 MG/1
200 TABLET, EXTENDED RELEASE ORAL DAILY
Qty: 28 TABLET | Refills: 0 | Status: SHIPPED | OUTPATIENT
Start: 2023-03-07 | End: 2023-03-22 | Stop reason: ALTCHOICE

## 2023-02-10 RX ORDER — PREGABALIN 50 MG/1
50 CAPSULE ORAL 2 TIMES DAILY
Qty: 60 CAPSULE | Refills: 0 | Status: SHIPPED | OUTPATIENT
Start: 2023-02-10 | End: 2023-03-17 | Stop reason: SDUPTHER

## 2023-02-10 RX ORDER — HYDROCODONE BITARTRATE AND ACETAMINOPHEN 5; 325 MG/1; MG/1
1 TABLET ORAL EVERY 6 HOURS PRN
Qty: 112 TABLET | Refills: 0 | Status: SHIPPED | OUTPATIENT
Start: 2023-03-07 | End: 2023-03-17 | Stop reason: SDUPTHER

## 2023-02-10 RX ORDER — TRAMADOL HYDROCHLORIDE 50 MG/1
50-100 TABLET ORAL EVERY 6 HOURS PRN
Qty: 224 TABLET | Refills: 0 | Status: CANCELLED | OUTPATIENT
Start: 2023-05-02 | End: 2023-05-30

## 2023-02-10 RX ORDER — ONDANSETRON 4 MG/1
4 TABLET, ORALLY DISINTEGRATING ORAL EVERY 8 HOURS PRN
Qty: 20 TABLET | Refills: 0 | Status: SHIPPED | OUTPATIENT
Start: 2023-02-10 | End: 2023-02-17

## 2023-02-10 RX ORDER — TRAMADOL HYDROCHLORIDE 50 MG/1
50-100 TABLET ORAL EVERY 6 HOURS PRN
Qty: 224 TABLET | Refills: 0 | Status: CANCELLED | OUTPATIENT
Start: 2023-04-04 | End: 2023-05-02

## 2023-02-10 RX ORDER — DICYCLOMINE HYDROCHLORIDE 10 MG/1
10 CAPSULE ORAL
Qty: 120 CAPSULE | Refills: 2 | Status: SHIPPED | OUTPATIENT
Start: 2023-02-10 | End: 2023-03-12

## 2023-02-10 RX ORDER — TRAMADOL HYDROCHLORIDE 50 MG/1
50-100 TABLET ORAL EVERY 6 HOURS PRN
Qty: 224 TABLET | Refills: 0 | Status: CANCELLED | OUTPATIENT
Start: 2023-03-07 | End: 2023-04-04

## 2023-02-10 ASSESSMENT — ENCOUNTER SYMPTOMS
ARTHRALGIAS: 1
JOINT SWELLING: 1
ABDOMINAL PAIN: 1

## 2023-02-10 ASSESSMENT — PAIN DESCRIPTION - DESCRIPTORS: DESCRIPTORS: SHARP

## 2023-02-10 NOTE — PATIENT INSTRUCTIONS
Stay on tramadol as usual and when an episode starts you can try to take a zofran that dissolves or a bentyl pill and see if that helps    Stop gabapentin and try lyrica 50 mg 2x/day, if gabapentin this am then do lyrica tonight    On 3/7/23 you will start tramadol long acting (200mg) and then with an episode or when needed take a hydrocodone up to 4x/day

## 2023-02-10 NOTE — TELEPHONE ENCOUNTER
Replace the gabapentin with lyrica (on her sheet today it was dc'd) and on 3/7 she will do the tramadol ER with hydrocodone and no longer have any short acting tramadol prn

## 2023-02-10 NOTE — TELEPHONE ENCOUNTER
Tresa at [facility] called to verify new Rx's is the Pregabalin to replace the Gabapentin or in addition to it? Also is the Tramadol ER to replace the Tramadol IR?

## 2023-02-17 LAB
2-HYDROXYETHYLFLURAZEPAM DETECTION BY LCMS: NOT DETECTED
6-MAM DETECTION BY LCMS: NOT DETECTED
6A-NALOXOL DETECTION BY LCMS: NOT DETECTED
7-AMINOCLONAZEPAM DETECTION BY LCMS: NOT DETECTED
ALPHA-HYDROXYALPRAZOLAM DETECTION BY LCMS: NOT DETECTED
ALPHA-HYDROXYTRIAZOLAM DETECTION BY LCMS: NOT DETECTED
ALPRAZOLAM DETECTION BY LCMS: NOT DETECTED
AMOBARBITAL/PENTOBARBITAL DETECTION BY LCMS: NOT DETECTED
AMPHETAMINE DETECTION BY LCMS: NOT DETECTED
BENZOYLECGONINE DETECTION BY LCMS: NOT DETECTED
BUPRENORPHINE DETECTION BY LCMS: NOT DETECTED
BUTALBITAL DETECTION BY LCMS: DETECTED
BUTALBITAL NUMERIC BY LCMS: >1000 NG/ML
CLONAZEPAM DETECTION BY LCMS: NOT DETECTED
CODEINE DETECTION BY LCMS: NOT DETECTED
CREAT UR-MCNC: 39.2 MG/DL
DESALKYLFLURAZEPAM DETECTION BY LCMS: NOT DETECTED
DESMETHYLTAPENTADOL DETECTION BY LCMS: NOT DETECTED
DIAZEPAM DETECTION BY LCMS: NOT DETECTED
DIHYDROCODEINE DETECTION BY LCMS: NOT DETECTED
EDDP DETECTION BY LCMS: NOT DETECTED
ETHANOL SERPL-MCNC: <10 MG/DL
FENTANYL DETECTION BY LCMS: NOT DETECTED
GABAPENTIN DETECTION BY LCMS: NOT DETECTED
GABAPENTIN NUMERIC BY LCMS: 402.49 NG/ML
HYDROCODONE DETECTION BY LCMS: NOT DETECTED
HYDROMORPHONE DETECTION BY LCMS: NOT DETECTED
HYDROXYMIDAZOLAM DETECTION BY LCMS: NOT DETECTED
LORAZEPAM DETECTION BY LCMS: NOT DETECTED
MDA (ECSTASY METABOLITE) DETECTION BY LCMS: NOT DETECTED
MDEA DETECTION BY LCMS: NOT DETECTED
MDMA (ECSTASY) DETECTION BY LCMS: NOT DETECTED
MEPERIDINE DETECTION BY LCMS: NOT DETECTED
METHADONE DETECTION BY LCMS: NOT DETECTED
METHAMPHET BLD-MCNC: NOT DETECTED UG/ML
MIDAZOLAM DETECTION BY LCMS: NOT DETECTED
MORPHINE DETECTION BY LCMS: NOT DETECTED
NALOXONE DETECTION BY LCMS: NOT DETECTED
NORBUPRENORPHINE DETECTION BY LCMS: NOT DETECTED
NORDIAZEPAM DETECTION BY LCMS: NOT DETECTED
NORFENTANYL DETECTION BY LCMS: NOT DETECTED
NORHYDROCODONE DETECTION BY LCMS: NOT DETECTED
NORHYDROMORPHONE DETECTION BY LCMS: NOT DETECTED
NORMEPERIDINE DETECTION BY LCMS: NOT DETECTED
NOROXYCODONE DETECTION BY LCMS: NOT DETECTED
NORPROPOXYPHENE DETECTION BY LCMS: NOT DETECTED
O-DESMETHYLTRAMADOL DETECTION BY LCMS: DETECTED
O-DESMETHYLTRAMADOL NUMERIC BY LCMS: >1000 NG/ML
OXAZEPAM DETECTION BY LCMS: NOT DETECTED
OXYCODONE DETECTION BY LCMS: NOT DETECTED
OXYMORPHONE DETECTION BY LCMS: NOT DETECTED
PCP DETECTION BY LCMS: NOT DETECTED
PH UR: 7.5 PH
PHENOBARBITAL DETECTION BY LCMS: NOT DETECTED
PHENTERMINE DETECTION BY LCMS: NOT DETECTED
PREGABALIN DETECTION BY LCMS: NOT DETECTED
PRIMIDONE DETECTION BY LCMS: NOT DETECTED
PROPOXYPHENE DETECTION BY LCMS: NOT DETECTED
RITALINIC ACID DETECTION BY LCMS: NOT DETECTED
SECOBARBITAL DETECTION BY LCMS: NOT DETECTED
SP GR UR: 1.01 (ref 1–1.03)
TAPENTADOL DETECTION BY LCMS: NOT DETECTED
TEMAZEPAM DETECTION BY LCMS: NOT DETECTED
THC-COOH DETECTION BY LCMS: NOT DETECTED
TOPIRAMATE DETECTION BY LCMS: NOT DETECTED
TRAMADOL DETECTION BY LCMS: DETECTED
TRAMADOL NUMERIC BY LCMS: >1000 NG/ML
TRIAZOLAM DETECTION BY LCMS: NOT DETECTED
ZOLPIDEM DETECTION BY LCMS: NOT DETECTED
ZOLPIDEM P4CA DETECTION BY LCMS: NOT DETECTED

## 2023-02-27 ENCOUNTER — TELEPHONE (OUTPATIENT)
Dept: PAIN MEDICINE | Facility: HOSPITAL | Age: 53
End: 2023-02-27
Payer: COMMERCIAL

## 2023-03-01 ENCOUNTER — ANCILLARY PROCEDURE (OUTPATIENT)
Dept: RADIOLOGY | Facility: HOSPITAL | Age: 53
End: 2023-03-01
Payer: COMMERCIAL

## 2023-03-01 DIAGNOSIS — T14.90XA TRAUMA: ICD-10-CM

## 2023-03-13 ENCOUNTER — TELEPHONE (OUTPATIENT)
Dept: ADMINISTRATIVE | Facility: HOSPITAL | Age: 53
End: 2023-03-13
Payer: COMMERCIAL

## 2023-03-13 ENCOUNTER — TELEPHONE (OUTPATIENT)
Dept: PAIN MEDICINE | Facility: HOSPITAL | Age: 53
End: 2023-03-13
Payer: COMMERCIAL

## 2023-03-17 RX ORDER — TRAMADOL HYDROCHLORIDE 200 MG/1
200 TABLET, EXTENDED RELEASE ORAL DAILY
Qty: 28 TABLET | Refills: 0 | Status: CANCELLED | OUTPATIENT
Start: 2023-06-09 | End: 2023-07-07

## 2023-03-17 RX ORDER — DICYCLOMINE HYDROCHLORIDE 10 MG/1
10 CAPSULE ORAL
Qty: 120 CAPSULE | Refills: 2 | Status: CANCELLED | OUTPATIENT
Start: 2023-03-17 | End: 2023-04-16

## 2023-03-17 RX ORDER — TRAMADOL HYDROCHLORIDE 200 MG/1
200 TABLET, EXTENDED RELEASE ORAL DAILY
Qty: 28 TABLET | Refills: 0 | Status: CANCELLED | OUTPATIENT
Start: 2023-04-14 | End: 2023-05-12

## 2023-03-17 RX ORDER — TRAMADOL HYDROCHLORIDE 200 MG/1
200 TABLET, EXTENDED RELEASE ORAL DAILY
Qty: 28 TABLET | Refills: 0 | Status: CANCELLED | OUTPATIENT
Start: 2023-05-12 | End: 2023-06-09

## 2023-03-21 NOTE — PROGRESS NOTES
Patient ID: Ivonne Giron is a 52 y.o. female.    Pt presents today to evaluate medication review.    CHIEF COMPLAINT:  Patient reports pain is abdomen off and on, right ankle.     HPI:  She is here for med follow-up after being on chronic tramadol 50 mg up to 8/day and she felt she was having more painful attacks and not sure that was helping as much anymore.  She was staying home more due to these episodes.      We decided to have her try long-acting tramadol 200 mg and up to 4/day of the hydrocodone 5/325 to see if we can get better relief.  She had emesis with this new regimen and figured out its the long-acting tramadol and that the hydrocodone.  She went to see her provider and stopped the long-acting tramadol and it resolved her GI issues.  She is hoping she can go back on the 50 g tramadol and alternate it with the hydrocodone.  She also stopped gabapentin and started low-dose Lyrica and is tolerating that.  She thinks she is doing more at home now.  She has not heard from GI yet and was told by her provider that they need notes to approve the referral.  Her last visit with GI was in 2017 and they did an MRCP.      In the past we have tried Bentyl which made her sleepy.      She also has some problems with her right ankle after a traumatic fracture and repair and she rolled it recently and now is trying to wear braces.      PAST PAIN HISTORY  Medications previously tried -bentyl makes her sleepy, butrans patch,hydrocodone post op ,gabapentin sleepy on high doses, tramadol ER vomiting  Other modalities completed -neurology for headaches  Past Injection List   TPI  ICNB      Pain Assessment: 0-10  Pain Score: 5 - Moderate pain  Pain Type: Chronic pain  Pain Location: Other (Comment) (in between ribs and stomach)  Pain Radiating Towards: Between ribs and goes down and around stomach towards back  Pain Descriptors: Sharp  Pain Frequency: Constant/continuous  Interference on Function: ADLS  Pain Onset:  Awakened from sleep  Clinical Progression: Not changed  Aggravating Factors: Other (Comment) (comes all the sudden)  Pain Interventions: Cold applied, Cold pack  Response to Interventions: combinations     /99 (BP Location: Right arm, Patient Position: Sitting, Cuff Size: Long Adult)   Pulse 78   Resp 16   SpO2 95%       Current Outpatient Medications:     traMADoL (ULTRAM 50) 50 mg tablet, TAKE 1 TO 2 TABLETS BY MOUTH EVERY 6 HOURS AS NEEDED FOR PAIN . DO NOT EXCEED 8 PER 24 HOURS (SCALE  8-10/10), Disp: 240 tablet, Rfl: 0    HYDROcodone-acetaminophen (NORCO) 5-325 mg per tablet, Take 1 tablet by mouth every 8 (eight) hours as needed, Disp: , Rfl:     hydrOXYzine (ATARAX) 25 mg tablet, Take 12.5 mg by mouth 2 (two) times a day, Disp: , Rfl:     rimegepant (Nurtec ODT) 75 mg tablet,disintegrating, Take 75 mg by mouth daily as needed (migraine) Max 1 tab per 24 hours, Disp: 8 tablet, Rfl: 11    butalbital-acetaminophen-caff (FIORICET, ESGIC) -40 mg, Take 1 tablet by mouth See administration instructions, Disp: , Rfl:     PARoxetine (PAXIL) 40 mg tablet, Take 40 mg by mouth every morning, Disp: , Rfl:     alendronate (FOSAMAX) 70 mg tablet, Take 70 mg by mouth once a week, Disp: , Rfl:     potassium chloride (KLOR-CON M20) 20 mEq CR tablet, Take 20 mEq by mouth daily, Disp: , Rfl: 1    M- Plus 27 mg iron- 1 mg tablet, Take 1 tablet by mouth daily, Disp: , Rfl:     cetirizine (ZyrTEC) 10 mg tablet, Take 10 mg by mouth as needed, Disp: , Rfl:     atorvastatin (LIPITOR) 40 mg tablet, Take 40 mg by mouth nightly, Disp: , Rfl:     ibuprofen (ADVIL,MOTRIN) 200 mg tablet, Take 200 mg by mouth as needed.  , Disp: , Rfl:     prenatal 25-iron fum-folic-dha 30975-200 mg-mcg-mg capsule, Take 1 capsule by mouth daily., Disp: , Rfl:     calcium carbonate-vitamin D3 500 mg-10 mcg (400 unit) tablet, Take 1 tablet by mouth daily.  , Disp: , Rfl:     losartan (COZAAR) 100 mg tablet, Take by mouth daily.  , Disp: ,  Rfl:     pregabalin (Lyrica) 50 mg capsule, Take 1 capsule (50 mg total) by mouth 2 (two) times a day Max Daily Amount: 100 mg, Disp: 60 capsule, Rfl: 2    ondansetron ODT (ZOFRAN-ODT) 4 mg disintegrating tablet, Take 1 tablet (4 mg total) by mouth every 8 (eight) hours as needed for nausea or vomiting, Disp: 20 tablet, Rfl: 2    [START ON 4/5/2023] HYDROcodone-acetaminophen (NORCO) 5-325 mg per tablet, Take 1 tablet by mouth every 6 (six) hours as needed (pain) for up to 28 days Max Daily Amount: 4 tablets, Disp: 112 tablet, Rfl: 0    [START ON 5/3/2023] HYDROcodone-acetaminophen (NORCO) 5-325 mg per tablet, Take 1 tablet by mouth every 6 (six) hours as needed (pain) for up to 28 days Max Daily Amount: 4 tablets, Disp: 112 tablet, Rfl: 0    [START ON 5/31/2023] HYDROcodone-acetaminophen (NORCO) 5-325 mg per tablet, Take 1 tablet by mouth every 6 (six) hours as needed (pain) for up to 28 days Max Daily Amount: 4 tablets, Disp: 112 tablet, Rfl: 0    traMADol (ULTRAM 50) 50 mg tablet, Take 1 tablet (50 mg total) by mouth every 6 (six) hours as needed for pain scale 8-10/10 for up to 14 days Max Daily Amount: 200 mg, Disp: 56 tablet, Rfl: 0    [START ON 4/5/2023] traMADol (ULTRAM 50) 50 mg tablet, Take 1 tablet (50 mg total) by mouth every 6 (six) hours as needed for pain scale 8-10/10 Max Daily Amount: 200 mg, Disp: 112 tablet, Rfl: 0    [START ON 5/3/2023] traMADol (ULTRAM 50) 50 mg tablet, Take 1 tablet (50 mg total) by mouth every 6 (six) hours as needed (pain) Max Daily Amount: 200 mg, Disp: 112 tablet, Rfl: 0    [START ON 5/31/2023] traMADol (ULTRAM 50) 50 mg tablet, Take 1 tablet (50 mg total) by mouth every 6 (six) hours as needed for pain scale 8-10/10 Max Daily Amount: 200 mg, Disp: 112 tablet, Rfl: 0    traMADoL (ULTRAM 50) 50 mg tablet, Take 1-2 tablets ( mg total) by mouth every 6 (six) hours as needed for pain scale 8-10/10 Max Daily Amount: 400 mg, Disp: 240 tablet, Rfl: 0    traMADoL (ULTRAM 50)  50 mg tablet, Take 1-2 tablets ( mg total) by mouth every 6 (six) hours as needed for pain scale 8-10/10 Max Daily Amount: 400 mg, Disp: 240 tablet, Rfl: 0    traMADoL (ULTRAM 50) 50 mg tablet, Take 1-2 tablets ( mg total) by mouth every 6 (six) hours as needed for pain scale 8-10/10 Max Daily Amount: 400 mg, Disp: 240 tablet, Rfl: 0    traMADoL (ULTRAM 50) 50 mg tablet, Take 1-2 tablets ( mg total) by mouth every 6 (six) hours as needed for pain scale 8-10/10 Max Daily Amount: 400 mg, Disp: 240 tablet, Rfl: 0    traMADoL (ULTRAM 50) 50 mg tablet, Take 1-2 tablets ( mg total) by mouth every 6 (six) hours as needed for pain scale 8-10/10 Max Daily Amount: 400 mg, Disp: 240 tablet, Rfl: 0    traMADoL (ULTRAM 50) 50 mg tablet, Take 1-2 tablets ( mg total) by mouth every 6 (six) hours as needed for pain scale 8-10/10 Max Daily Amount: 400 mg, Disp: 240 tablet, Rfl: 0  Past Medical History:   Diagnosis Date    Abdominal pain, chronic, generalized     Accelerated essential hypertension     Chronic pain disorder     Intercostal neuropathic pain     Migraine     Pain     surgical sight pain since gallbladder surgery      Past Surgical History:   Procedure Laterality Date    ANKLE SURGERY Right 02/2020    CHOLECYSTECTOMY        Review of Systems   Gastrointestinal:  Positive for abdominal pain.   Musculoskeletal:  Positive for gait problem and myalgias.     Imaging:  X-ray ankle 3 or more views right    Result Date: 3/1/2023  Narrative: EXAM: DX ANKLE 3 OR MORE VW RIGHT DATE: 3/1/2023 5:25 PM INDICATION: Trauma; COMPARISON: 2/18/2021 TECHNIQUE: 3 views. FINDINGS: No acute fracture or dislocation. Intact mortise. Degenerative changes of the tibiotalar joint. Heel spur.     Impression: IMPRESSION: No acute fracture or dislocation.      Physical Exam  Vitals and nursing note reviewed.   Constitutional:       General: She is not in acute distress.     Appearance: She is well-developed.   HENT:       Head: Normocephalic.   Eyes:      Conjunctiva/sclera: Conjunctivae normal.   Cardiovascular:      Rate and Rhythm: Normal rate.   Pulmonary:      Effort: Pulmonary effort is normal. No respiratory distress.      Breath sounds: Normal breath sounds.   Abdominal:      General: There is no distension.      Tenderness: There is no abdominal tenderness. There is no guarding or rebound.          Comments: Moves well in the room and no obvious abdominal pain during today's assessment.   Musculoskeletal:         General: Tenderness present. No deformity.      Cervical back: Normal range of motion and neck supple.      Right ankle: No swelling (has a boot on). No tenderness. Decreased range of motion.        Legs:       Comments: Gait is fairly steady and limps somewhat on the right ankle and has a shoe on and no obvious swelling    No obvious abdominal distention or pain today     Skin:     General: Skin is warm and dry.      Findings: No rash.   Neurological:      Mental Status: She is alert and oriented to person, place, and time.   Psychiatric:         Behavior: Behavior normal.         Thought Content: Thought content normal.         Judgment: Judgment normal.       ASSESSMENT  1. Abdominal pain, chronic, right upper quadrant  pregabalin (Lyrica) 50 mg capsule, ondansetron ODT (ZOFRAN-ODT) 4 mg disintegrating tablet, HYDROcodone-acetaminophen (NORCO) 5-325 mg per tablet, HYDROcodone-acetaminophen (NORCO) 5-325 mg per tablet, HYDROcodone-acetaminophen (NORCO) 5-325 mg per tablet, traMADol (ULTRAM 50) 50 mg tablet, traMADol (ULTRAM 50) 50 mg tablet, traMADol (ULTRAM 50) 50 mg tablet, traMADol (ULTRAM 50) 50 mg tablet      2. Encounter for long-term opiate analgesic use  HYDROcodone-acetaminophen (NORCO) 5-325 mg per tablet, HYDROcodone-acetaminophen (NORCO) 5-325 mg per tablet, HYDROcodone-acetaminophen (NORCO) 5-325 mg per tablet, traMADol (ULTRAM 50) 50 mg tablet, traMADol (ULTRAM 50) 50 mg tablet, traMADol (ULTRAM 50)  50 mg tablet      3. Chronic pain disorder  HYDROcodone-acetaminophen (NORCO) 5-325 mg per tablet, HYDROcodone-acetaminophen (NORCO) 5-325 mg per tablet, HYDROcodone-acetaminophen (NORCO) 5-325 mg per tablet, traMADol (ULTRAM 50) 50 mg tablet, traMADol (ULTRAM 50) 50 mg tablet, traMADol (ULTRAM 50) 50 mg tablet, traMADol (ULTRAM 50) 50 mg tablet      4. Closed fracture of right ankle with routine healing  HYDROcodone-acetaminophen (NORCO) 5-325 mg per tablet, HYDROcodone-acetaminophen (NORCO) 5-325 mg per tablet, HYDROcodone-acetaminophen (NORCO) 5-325 mg per tablet, traMADol (ULTRAM 50) 50 mg tablet, traMADol (ULTRAM 50) 50 mg tablet, traMADol (ULTRAM 50) 50 mg tablet, traMADol (ULTRAM 50) 50 mg tablet      5. Long-term current use of opiate analgesic  HYDROcodone-acetaminophen (NORCO) 5-325 mg per tablet, HYDROcodone-acetaminophen (NORCO) 5-325 mg per tablet, HYDROcodone-acetaminophen (NORCO) 5-325 mg per tablet, traMADol (ULTRAM 50) 50 mg tablet, traMADol (ULTRAM 50) 50 mg tablet, traMADol (ULTRAM 50) 50 mg tablet, traMADol (ULTRAM 50) 50 mg tablet             PLAN    Medication Therapy: She will stop the long-acting tramadol  mg and return to 50 mg 4 times daily as needed along with hydrocodone 5/325 4 times daily as needed and Lyrica 50 mg twice daily.  She will dispose of the tramadol long-acting either at her local pharmacy or bring to her next appointment  Injections: None  Referrals placed: We will send her notes to her please go see GI for these episodes  Consider for future treatments: Unsure, this is the first time we have made a medication change for a long time and she says she is doing a little better but I still wonder why she keeps having these episodes    PDMP reviewed.  MME = unreliable.  Narcan not done. Aberrant behavior = no. Affect/Mood = about the same. ADL = independent. Analgesia = adequate. Adverse Side Effects = no. Goals have been addressed and discussed with this patient.  The  patient was advised that if at any time they feel oversedated to stop the medication and seek emergency care, I have counseled the patient regarding safe use of their opioids.. UDS and contract February 2023    DISCUSSION  Today's plan was a combined effort between the patient and myself. The patient understood the plan, verbally consented, and agreed to participate. An After Visit Summary with special instructions/information regarding today's office visit was given to the patient (see patient instructions).     The diagnostic assessment has been reviewed. Patient and/or patient's surrogate has expressed a good understanding of the assessment and recommendations from today's visit. There are no apparent barriers to understanding this information. Patient’s questions were addressed.  I have reviewed the patients current medications using all immediate resources available.     Patient has been advised to contact this office or the answering service with questions or concerns that may arise. Patient has been advised to follow up with Primary Care Provider for general medical needs.     A total of 20 minutes was required for this visit. Greater than 50% of this time was spent in direct face to face communication with the patient and/or surrogate in order to provide counseling regardingnew medications and options and referrals. .    Dragon voice recognition program was used to aid in this documentation which might generate inaccuracies despite efforts made to avoid them.  Please take it into context and contact the provider with any questions.    Roberto Elliott, CNP

## 2023-03-22 ENCOUNTER — TELEPHONE (OUTPATIENT)
Dept: PAIN MEDICINE | Facility: HOSPITAL | Age: 53
End: 2023-03-22

## 2023-03-22 ENCOUNTER — OFFICE VISIT (OUTPATIENT)
Dept: PAIN MEDICINE | Facility: HOSPITAL | Age: 53
End: 2023-03-22
Payer: COMMERCIAL

## 2023-03-22 VITALS
DIASTOLIC BLOOD PRESSURE: 99 MMHG | SYSTOLIC BLOOD PRESSURE: 152 MMHG | OXYGEN SATURATION: 95 % | HEART RATE: 78 BPM | RESPIRATION RATE: 16 BRPM

## 2023-03-22 DIAGNOSIS — Z79.891 LONG-TERM CURRENT USE OF OPIATE ANALGESIC: ICD-10-CM

## 2023-03-22 DIAGNOSIS — S82.891D CLOSED FRACTURE OF RIGHT ANKLE WITH ROUTINE HEALING: ICD-10-CM

## 2023-03-22 DIAGNOSIS — G89.4 CHRONIC PAIN DISORDER: ICD-10-CM

## 2023-03-22 DIAGNOSIS — Z79.891 ENCOUNTER FOR LONG-TERM OPIATE ANALGESIC USE: ICD-10-CM

## 2023-03-22 DIAGNOSIS — G89.29 ABDOMINAL PAIN, CHRONIC, RIGHT UPPER QUADRANT: ICD-10-CM

## 2023-03-22 DIAGNOSIS — R10.11 ABDOMINAL PAIN, CHRONIC, RIGHT UPPER QUADRANT: ICD-10-CM

## 2023-03-22 PROCEDURE — G0463 HOSPITAL OUTPT CLINIC VISIT: HCPCS

## 2023-03-22 RX ORDER — TRAMADOL HYDROCHLORIDE 50 MG/1
50 TABLET ORAL EVERY 6 HOURS PRN
Qty: 56 TABLET | Refills: 0 | Status: SHIPPED | OUTPATIENT
Start: 2023-03-22 | End: 2023-05-24 | Stop reason: ALTCHOICE

## 2023-03-22 RX ORDER — TRAMADOL HYDROCHLORIDE 50 MG/1
50 TABLET ORAL EVERY 6 HOURS PRN
Qty: 112 TABLET | Refills: 0 | Status: SHIPPED | OUTPATIENT
Start: 2023-04-05 | End: 2024-02-09 | Stop reason: WASHOUT

## 2023-03-22 RX ORDER — HYDROCODONE BITARTRATE AND ACETAMINOPHEN 5; 325 MG/1; MG/1
1 TABLET ORAL EVERY 6 HOURS PRN
Qty: 112 TABLET | Refills: 0 | Status: SHIPPED | OUTPATIENT
Start: 2023-04-05 | End: 2023-05-03

## 2023-03-22 RX ORDER — TRAMADOL HYDROCHLORIDE 50 MG/1
50 TABLET ORAL EVERY 6 HOURS PRN
Qty: 112 TABLET | Refills: 0 | Status: SHIPPED | OUTPATIENT
Start: 2023-05-31 | End: 2024-02-09 | Stop reason: WASHOUT

## 2023-03-22 RX ORDER — HYDROCODONE BITARTRATE AND ACETAMINOPHEN 5; 325 MG/1; MG/1
1 TABLET ORAL EVERY 6 HOURS PRN
Qty: 112 TABLET | Refills: 0 | Status: SHIPPED | OUTPATIENT
Start: 2023-05-31 | End: 2023-06-14 | Stop reason: SDUPTHER

## 2023-03-22 RX ORDER — ONDANSETRON 4 MG/1
4 TABLET, ORALLY DISINTEGRATING ORAL EVERY 8 HOURS PRN
Qty: 20 TABLET | Refills: 2 | Status: SHIPPED | OUTPATIENT
Start: 2023-03-22

## 2023-03-22 RX ORDER — PREGABALIN 50 MG/1
50 CAPSULE ORAL 2 TIMES DAILY
Qty: 60 CAPSULE | Refills: 2 | Status: SHIPPED | OUTPATIENT
Start: 2023-03-22 | End: 2023-05-24

## 2023-03-22 RX ORDER — HYDROCODONE BITARTRATE AND ACETAMINOPHEN 5; 325 MG/1; MG/1
1 TABLET ORAL EVERY 6 HOURS PRN
Qty: 112 TABLET | Refills: 0 | Status: SHIPPED | OUTPATIENT
Start: 2023-05-03 | End: 2023-05-31

## 2023-03-22 RX ORDER — TRAMADOL HYDROCHLORIDE 50 MG/1
50 TABLET ORAL EVERY 6 HOURS PRN
Qty: 112 TABLET | Refills: 0 | Status: SHIPPED | OUTPATIENT
Start: 2023-05-03 | End: 2024-02-09 | Stop reason: WASHOUT

## 2023-03-22 ASSESSMENT — PAIN - FUNCTIONAL ASSESSMENT: PAIN_FUNCTIONAL_ASSESSMENT: 0-10

## 2023-03-22 ASSESSMENT — PAIN DESCRIPTION - DESCRIPTORS: DESCRIPTORS: SHARP

## 2023-03-22 ASSESSMENT — ENCOUNTER SYMPTOMS
ABDOMINAL PAIN: 1
MYALGIAS: 1

## 2023-03-22 NOTE — LETTER
[facility]  [address] [place] [id number] [phone]  Dept: [phone]  March 22, 2023     [facility]  [address] [facility] [place] [address]    Patient: Ivonne Giron   YOB: 1970   Date of Visit: 3/22/2023       To:  [facility]    Our mutual patient, Ivonne Giron, was seen in my office on 3/22/2023. Below are my notes.     Roberto Elliott, CNP  3/22/2023  1:00 PM  Signed  Patient ID: Ivonne Giron is a 52 y.o. female.    Pt presents today to evaluate medication review.    CHIEF COMPLAINT:  Patient reports pain is abdomen off and on, right ankle.     HPI:  She is here for med follow-up after being on chronic tramadol 50 mg up to 8/day and she felt she was having more painful attacks and not sure that was helping as much anymore.  She was staying home more due to these episodes.      We decided to have her try long-acting tramadol 200 mg and up to 4/day of the hydrocodone 5/325 to see if we can get better relief.  She had emesis with this new regimen and figured out its the long-acting tramadol and that the hydrocodone.  She went to see her provider and stopped the long-acting tramadol and it resolved her GI issues.  She is hoping she can go back on the 50 g tramadol and alternate it with the hydrocodone.  She also stopped gabapentin and started low-dose Lyrica and is tolerating that.  She thinks she is doing more at home now.  She has not heard from GI yet and was told by her provider that they need notes to approve the referral.  Her last visit with GI was in 2017 and they did an MRCP.      In the past we have tried Bentyl which made her sleepy.      She also has some problems with her right ankle after a traumatic fracture and repair and she rolled it recently and now is trying to wear braces.      PAST PAIN HISTORY  Medications previously tried -bentyl makes her sleepy, butrans  patch,hydrocodone post op ,gabapentin sleepy on high doses, tramadol ER vomiting  Other modalities completed -neurology for headaches  Past Injection List   TPI  ICNB      Pain Assessment: 0-10  Pain Score: 5 - Moderate pain  Pain Type: Chronic pain  Pain Location: Other (Comment) (in between ribs and stomach)  Pain Radiating Towards: Between ribs and goes down and around stomach towards back  Pain Descriptors: Sharp  Pain Frequency: Constant/continuous  Interference on Function: ADLS  Pain Onset: Awakened from sleep  Clinical Progression: Not changed  Aggravating Factors: Other (Comment) (comes all the sudden)  Pain Interventions: Cold applied, Cold pack  Response to Interventions: combinations     /99 (BP Location: Right arm, Patient Position: Sitting, Cuff Size: Long Adult)   Pulse 78   Resp 16   SpO2 95%       Current Outpatient Medications:   •  traMADoL (ULTRAM 50) 50 mg tablet, TAKE 1 TO 2 TABLETS BY MOUTH EVERY 6 HOURS AS NEEDED FOR PAIN . DO NOT EXCEED 8 PER 24 HOURS (SCALE  8-10/10), Disp: 240 tablet, Rfl: 0  •  HYDROcodone-acetaminophen (NORCO) 5-325 mg per tablet, Take 1 tablet by mouth every 8 (eight) hours as needed, Disp: , Rfl:   •  hydrOXYzine (ATARAX) 25 mg tablet, Take 12.5 mg by mouth 2 (two) times a day, Disp: , Rfl:   •  rimegepant (Nurtec ODT) 75 mg tablet,disintegrating, Take 75 mg by mouth daily as needed (migraine) Max 1 tab per 24 hours, Disp: 8 tablet, Rfl: 11  •  butalbital-acetaminophen-caff (FIORICET, ESGIC) -40 mg, Take 1 tablet by mouth See administration instructions, Disp: , Rfl:   •  PARoxetine (PAXIL) 40 mg tablet, Take 40 mg by mouth every morning, Disp: , Rfl:   •  alendronate (FOSAMAX) 70 mg tablet, Take 70 mg by mouth once a week, Disp: , Rfl:   •  potassium chloride (KLOR-CON M20) 20 mEq CR tablet, Take 20 mEq by mouth daily, Disp: , Rfl: 1  •  M-Libia Plus 27 mg iron- 1 mg tablet, Take 1 tablet by mouth daily, Disp: , Rfl:   •  cetirizine (ZyrTEC) 10 mg  tablet, Take 10 mg by mouth as needed, Disp: , Rfl:   •  atorvastatin (LIPITOR) 40 mg tablet, Take 40 mg by mouth nightly, Disp: , Rfl:   •  ibuprofen (ADVIL,MOTRIN) 200 mg tablet, Take 200 mg by mouth as needed.  , Disp: , Rfl:   •  prenatal 25-iron fum-folic-dha -200 mg-mcg-mg capsule, Take 1 capsule by mouth daily., Disp: , Rfl:   •  calcium carbonate-vitamin D3 500 mg-10 mcg (400 unit) tablet, Take 1 tablet by mouth daily.  , Disp: , Rfl:   •  losartan (COZAAR) 100 mg tablet, Take by mouth daily.  , Disp: , Rfl:   •  pregabalin (Lyrica) 50 mg capsule, Take 1 capsule (50 mg total) by mouth 2 (two) times a day Max Daily Amount: 100 mg, Disp: 60 capsule, Rfl: 2  •  ondansetron ODT (ZOFRAN-ODT) 4 mg disintegrating tablet, Take 1 tablet (4 mg total) by mouth every 8 (eight) hours as needed for nausea or vomiting, Disp: 20 tablet, Rfl: 2  •  [START ON 4/5/2023] HYDROcodone-acetaminophen (NORCO) 5-325 mg per tablet, Take 1 tablet by mouth every 6 (six) hours as needed (pain) for up to 28 days Max Daily Amount: 4 tablets, Disp: 112 tablet, Rfl: 0  •  [START ON 5/3/2023] HYDROcodone-acetaminophen (NORCO) 5-325 mg per tablet, Take 1 tablet by mouth every 6 (six) hours as needed (pain) for up to 28 days Max Daily Amount: 4 tablets, Disp: 112 tablet, Rfl: 0  •  [START ON 5/31/2023] HYDROcodone-acetaminophen (NORCO) 5-325 mg per tablet, Take 1 tablet by mouth every 6 (six) hours as needed (pain) for up to 28 days Max Daily Amount: 4 tablets, Disp: 112 tablet, Rfl: 0  •  traMADol (ULTRAM 50) 50 mg tablet, Take 1 tablet (50 mg total) by mouth every 6 (six) hours as needed for pain scale 8-10/10 for up to 14 days Max Daily Amount: 200 mg, Disp: 56 tablet, Rfl: 0  •  [START ON 4/5/2023] traMADol (ULTRAM 50) 50 mg tablet, Take 1 tablet (50 mg total) by mouth every 6 (six) hours as needed for pain scale 8-10/10 Max Daily Amount: 200 mg, Disp: 112 tablet, Rfl: 0  •  [START ON 5/3/2023] traMADol (ULTRAM 50) 50 mg tablet, Take  1 tablet (50 mg total) by mouth every 6 (six) hours as needed (pain) Max Daily Amount: 200 mg, Disp: 112 tablet, Rfl: 0  •  [START ON 5/31/2023] traMADol (ULTRAM 50) 50 mg tablet, Take 1 tablet (50 mg total) by mouth every 6 (six) hours as needed for pain scale 8-10/10 Max Daily Amount: 200 mg, Disp: 112 tablet, Rfl: 0  •  traMADoL (ULTRAM 50) 50 mg tablet, Take 1-2 tablets ( mg total) by mouth every 6 (six) hours as needed for pain scale 8-10/10 Max Daily Amount: 400 mg, Disp: 240 tablet, Rfl: 0  •  traMADoL (ULTRAM 50) 50 mg tablet, Take 1-2 tablets ( mg total) by mouth every 6 (six) hours as needed for pain scale 8-10/10 Max Daily Amount: 400 mg, Disp: 240 tablet, Rfl: 0  •  traMADoL (ULTRAM 50) 50 mg tablet, Take 1-2 tablets ( mg total) by mouth every 6 (six) hours as needed for pain scale 8-10/10 Max Daily Amount: 400 mg, Disp: 240 tablet, Rfl: 0  •  traMADoL (ULTRAM 50) 50 mg tablet, Take 1-2 tablets ( mg total) by mouth every 6 (six) hours as needed for pain scale 8-10/10 Max Daily Amount: 400 mg, Disp: 240 tablet, Rfl: 0  •  traMADoL (ULTRAM 50) 50 mg tablet, Take 1-2 tablets ( mg total) by mouth every 6 (six) hours as needed for pain scale 8-10/10 Max Daily Amount: 400 mg, Disp: 240 tablet, Rfl: 0  •  traMADoL (ULTRAM 50) 50 mg tablet, Take 1-2 tablets ( mg total) by mouth every 6 (six) hours as needed for pain scale 8-10/10 Max Daily Amount: 400 mg, Disp: 240 tablet, Rfl: 0  Past Medical History:   Diagnosis Date   • Abdominal pain, chronic, generalized    • Accelerated essential hypertension    • Chronic pain disorder    • Intercostal neuropathic pain    • Migraine    • Pain     surgical sight pain since gallbladder surgery      Past Surgical History:   Procedure Laterality Date   • ANKLE SURGERY Right 02/2020   • CHOLECYSTECTOMY        Review of Systems   Gastrointestinal:  Positive for abdominal pain.   Musculoskeletal:  Positive for gait problem and myalgias.      Imaging:  X-ray ankle 3 or more views right    Result Date: 3/1/2023  Narrative: EXAM: DX ANKLE 3 OR MORE VW RIGHT DATE: 3/1/2023 5:25 PM INDICATION: Trauma; COMPARISON: 2/18/2021 TECHNIQUE: 3 views. FINDINGS: No acute fracture or dislocation. Intact mortise. Degenerative changes of the tibiotalar joint. Heel spur.     Impression: IMPRESSION: No acute fracture or dislocation.      Physical Exam  Vitals and nursing note reviewed.   Constitutional:       General: She is not in acute distress.     Appearance: She is well-developed.   HENT:      Head: Normocephalic.   Eyes:      Conjunctiva/sclera: Conjunctivae normal.   Cardiovascular:      Rate and Rhythm: Normal rate.   Pulmonary:      Effort: Pulmonary effort is normal. No respiratory distress.      Breath sounds: Normal breath sounds.   Abdominal:      General: There is no distension.      Tenderness: There is no abdominal tenderness. There is no guarding or rebound.          Comments: Moves well in the room and no obvious abdominal pain during today's assessment.   Musculoskeletal:         General: Tenderness present. No deformity.      Cervical back: Normal range of motion and neck supple.      Right ankle: No swelling (has a boot on). No tenderness. Decreased range of motion.        Legs:       Comments: Gait is fairly steady and limps somewhat on the right ankle and has a shoe on and no obvious swelling    No obvious abdominal distention or pain today     Skin:     General: Skin is warm and dry.      Findings: No rash.   Neurological:      Mental Status: She is alert and oriented to person, place, and time.   Psychiatric:         Behavior: Behavior normal.         Thought Content: Thought content normal.         Judgment: Judgment normal.       ASSESSMENT  1. Abdominal pain, chronic, right upper quadrant  pregabalin (Lyrica) 50 mg capsule, ondansetron ODT (ZOFRAN-ODT) 4 mg disintegrating tablet, HYDROcodone-acetaminophen (NORCO) 5-325 mg per tablet,  HYDROcodone-acetaminophen (NORCO) 5-325 mg per tablet, HYDROcodone-acetaminophen (NORCO) 5-325 mg per tablet, traMADol (ULTRAM 50) 50 mg tablet, traMADol (ULTRAM 50) 50 mg tablet, traMADol (ULTRAM 50) 50 mg tablet, traMADol (ULTRAM 50) 50 mg tablet      2. Encounter for long-term opiate analgesic use  HYDROcodone-acetaminophen (NORCO) 5-325 mg per tablet, HYDROcodone-acetaminophen (NORCO) 5-325 mg per tablet, HYDROcodone-acetaminophen (NORCO) 5-325 mg per tablet, traMADol (ULTRAM 50) 50 mg tablet, traMADol (ULTRAM 50) 50 mg tablet, traMADol (ULTRAM 50) 50 mg tablet      3. Chronic pain disorder  HYDROcodone-acetaminophen (NORCO) 5-325 mg per tablet, HYDROcodone-acetaminophen (NORCO) 5-325 mg per tablet, HYDROcodone-acetaminophen (NORCO) 5-325 mg per tablet, traMADol (ULTRAM 50) 50 mg tablet, traMADol (ULTRAM 50) 50 mg tablet, traMADol (ULTRAM 50) 50 mg tablet, traMADol (ULTRAM 50) 50 mg tablet      4. Closed fracture of right ankle with routine healing  HYDROcodone-acetaminophen (NORCO) 5-325 mg per tablet, HYDROcodone-acetaminophen (NORCO) 5-325 mg per tablet, HYDROcodone-acetaminophen (NORCO) 5-325 mg per tablet, traMADol (ULTRAM 50) 50 mg tablet, traMADol (ULTRAM 50) 50 mg tablet, traMADol (ULTRAM 50) 50 mg tablet, traMADol (ULTRAM 50) 50 mg tablet      5. Long-term current use of opiate analgesic  HYDROcodone-acetaminophen (NORCO) 5-325 mg per tablet, HYDROcodone-acetaminophen (NORCO) 5-325 mg per tablet, HYDROcodone-acetaminophen (NORCO) 5-325 mg per tablet, traMADol (ULTRAM 50) 50 mg tablet, traMADol (ULTRAM 50) 50 mg tablet, traMADol (ULTRAM 50) 50 mg tablet, traMADol (ULTRAM 50) 50 mg tablet             PLAN    Medication Therapy: She will stop the long-acting tramadol  mg and return to 50 mg 4 times daily as needed along with hydrocodone 5/325 4 times daily as needed and Lyrica 50 mg twice daily.  She will dispose of the tramadol long-acting either at her local pharmacy or bring to her next  appointment  Injections: None  Referrals placed: We will send her notes to her please go see GI for these episodes  Consider for future treatments: Unsure, this is the first time we have made a medication change for a long time and she says she is doing a little better but I still wonder why she keeps having these episodes    PDMP reviewed.  MME = unreliable.  Narcan not done. Aberrant behavior = no. Affect/Mood = about the same. ADL = independent. Analgesia = adequate. Adverse Side Effects = no. Goals have been addressed and discussed with this patient.  The patient was advised that if at any time they feel oversedated to stop the medication and seek emergency care, I have counseled the patient regarding safe use of their opioids.. UDS and contract February 2023    DISCUSSION  Today's plan was a combined effort between the patient and myself. The patient understood the plan, verbally consented, and agreed to participate. An After Visit Summary with special instructions/information regarding today's office visit was given to the patient (see patient instructions).     The diagnostic assessment has been reviewed. Patient and/or patient's surrogate has expressed a good understanding of the assessment and recommendations from today's visit. There are no apparent barriers to understanding this information. Patient’s questions were addressed.  I have reviewed the patients current medications using all immediate resources available.     Patient has been advised to contact this office or the answering service with questions or concerns that may arise. Patient has been advised to follow up with Primary Care Provider for general medical needs.     A total of 20 minutes was required for this visit. Greater than 50% of this time was spent in direct face to face communication with the patient and/or surrogate in order to provide counseling regardingnew medications and options and referrals. .    Dragon voice recognition program  was used to aid in this documentation which might generate inaccuracies despite efforts made to avoid them.  Please take it into context and contact the provider with any questions.    Roberto Elliott CNP      If you have questions, please do not hesitate to call me. I look forward to following your patient along with you.         Sincerely,        Roberto Elliott CNP        CC: No Recipients

## 2023-03-22 NOTE — PATIENT INSTRUCTIONS
Stop tramadol  mg and bring next appt and we will dispose it and return to tramadol 50 mg up to 4 per day and alternate with hydrocodone up to 4 per day   Tramadol 50 mg and lyrica  today and zofran  Next due dates for all meds will be 4/5, 5/3, 5/31        You have been prescribed controlled narcotics to help with your pain with the hopes that they improve your quality of life. These medications need to be taken as prescribed, if not taken as presribed may interfere with your daily life & routine. Some medications are written to be taken as needed, please use these medications at the miniumum amount needed that day.  There will be a frequency of when to take (i.e. every 4,6,8 hours) this is listed for your safety & the medication should not be taken prior to the time listed.  This frequency however does not mean you HAVE to take the medication at that time, only that you can IF you are having severe pain. The frequency of how often a medication can be taken safely and the number of pills provided to you may differ. The number of pills given are for a minimum of 28 day supply and will not be filled earlier than 28 days from previous . If your medication is a scheduled medication and does not say AS NEEDED please take medication as prescribed. If the scheduled medications are not taken regularly your health maybe compromised. If you feel your pain is uncontrolled by the medication regime please contact our office before taking more medication than prescribed.    As with most medications, abstaining from alcohol is extremely important, as medications and alcohol can have serious, detrimental results that could result in significant morbidity or mortality. Please do not drive or operate heavy machinery while under the influence of such medications. In the same way you can be reprimanded for drinking under the influence of alcohol, you can also have the same result of taking medications and doing the  same.     I would like to have you back in the clinic on a regular basis to assess you pain management regimen. These appointment may range from 1 month follow up to every 3 months. You must be seen at least every 3 months in order to continue to receive controlled narcotic prescriptions. I may consider in the future weaning down on your medication, or even getting to the point that we could completely discontinue pain medications all together due to the addictive nature of these medications.     Our system, Epic is integrated with the PDMP (Prescription Drug Monitoring Program) and this is checked at every single visit. Thus your PDMP has been checked for this visit as well.     Below we have listed new medications (if any), medications changes (if any), and discontinued medications (if any). If any of these medications do not appear to be correct, or you are no longer taking them, or would like to stop taking them we need to know about it. Please notify our office by contacting [phone].     Please do not alter medications without letting the pain staff know, as some medications need to be discontinued slowly. Thank you for your understanding!    Managing Pain Without Prescribed Pain Medication or Opioids    Opioids are strong medicines used to treat moderate to severe pain. For some people, especially those who have long-term (chronic) pain, opioids may not be the best choice for pain management due to:  Side effects like nausea, constipation, and sleepiness.  The risk of addiction (opioid use disorder). The longer you take opioids, the greater your risk of addiction.  Pain that lasts for more than 3 months is called chronic pain. Managing chronic pain usually requires more than one approach and is often provided by a team of health care providers working together (multidisciplinary approach). Pain management may be done at a pain management center or pain clinic.    Types of pain management without  prescribed pain medication    Managing pain without opioids can involve:  Non-opioid medicines.  Exercises to help relieve pain and improve strength and range of motion (physical therapy).  Therapy to help with everyday tasks and activities (occupational therapy).  Therapy to help you find ways to relieve pain by doing things you enjoy (recreational therapy).  Talk therapy (psychotherapy) and other mental health therapies.  Medical treatments such as injections or devices.  Making lifestyle changes.    Pain management options    Non-opioid medicines  Non-opioid medicines for pain may include medicines taken by mouth (oral medicines), such as:  Over-the-counter or prescription NSAIDs. These may be the first medicines used for pain. They work well for muscle and bone pain, and they reduce swelling.  Acetaminophen. This over-the-counter medicine may work well for milder pain but not swelling.  Antidepressants. These may be used to treat chronic pain. A certain type of antidepressant (tricyclics) is often used. These medicines are given in lower doses for pain than when used for depression.  Anticonvulsants. These are usually used to treat seizures but may also reduce nerve (neuropathic) pain.  Muscle relaxants. These relieve pain caused by sudden muscle tightening (spasms).  You may also use a type of pain medicine that is applied to the skin as a patch, cream, or gel (topical analgesic), such as a numbing medicine. These may cause fewer side effects than oral medicines.    Therapy    Physical therapy involves doing exercises to gain strength and flexibility. A physical therapist may teach you exercises to move and stretch parts of your body that are weak, stiff, or painful. You can learn these exercises at physical therapy visits and practice them at home. Physical therapy may also involve:  Massage.  Heat wraps or applying heat or cold to affected areas.  Sending electrical signals through the skin to interrupt pain  signals (transcutaneous electrical nerve stimulation, TENS).  Sending weak lasers through the skin to reduce pain and swelling (low-level laser therapy).  Using signals from your body to help you learn to regulate pain (biofeedback).  Occupational therapy helps you learn ways to function at home and work with less pain.  Recreational therapy may involve trying new activities or hobbies, such as drawing or a physical activity.  Types of mental health therapy for pain include:  Cognitive behavioral therapy (CBT) to help you learn coping skills for dealing with pain.  Acceptance and commitment therapy (ACT) to change the way you think and react to pain.  Relaxation therapies, including muscle relaxation exercises and focusing your mind on the present moment to lower stress (mindfulness-based stress reduction).  Pain management counseling. This may be individual, family, or group counseling.     Medical treatments  Medical treatments for pain management include:  Nerve block injections. These may include a pain blocker and anti-inflammatory medicines. You may have injections:  Near the spine to relieve chronic back or neck pain.  Into joints to relieve back or joint pain.  Into nerve areas that supply a painful area to relieve body pain.  Into muscles (trigger point injections) to relieve some painful muscle conditions.  A medical device placed near your spine to help block pain signals and relieve nerve pain or chronic back pain (spinal cord stimulation device).  Acupuncture.  Follow these instructions at home    Medicines  Take over-the-counter and prescription medicines only as told by your health care provider.  If you are taking pain medicine, ask your health care providers about possible side effects to watch out for.  Do not drive or use heavy machinery while taking prescription pain medicine.    Lifestyle       Do not use drugs or alcohol to reduce pain.  Do not use any products that contain nicotine or tobacco,  such as cigarettes and e-cigarettes. These can delay healing. If you need help quitting, ask your health care provider.  Eat a healthy diet and maintain a healthy weight. Poor diet and excess weight may make pain worse.  Eat foods that are high in fiber. These include fresh fruits and vegetables, whole grains, and beans.  Limit foods that are high in fat and processed sugars, such as fried and sweet foods.  Exercise regularly. Exercise lowers stress and may help relieve pain.  Ask your health care provider what activities and exercises are safe for you.  If your health care provider approves, join an exercise class that combines movement and stress reduction. Examples include yoga and azeb chi.  Get enough sleep. Lack of sleep may make pain worse.  Lower stress as much as possible. Practice stress reduction techniques as told by your therapist.    General instructions  Work with all your pain management providers to find the treatments that work best for you. You are an important member of your pain management team. There are many things you can do to reduce pain on your own.  Consider joining an online or in-person support group for people who have chronic pain.  Keep all follow-up visits as told by your health care providers. This is important.    Where to find more information  You can find more information about managing pain without opioids from:  American Academy of Pain Medicine: painmed.org  [place] [facility]: instituteforchronicpain.org  American Chronic Pain Association: theacpa.org    Contact a health care provider if:  You have side effects from pain medicine.  Your pain gets worse or does not get better with treatments or home care.  You are struggling with anxiety or depression.    Summary  Many types of pain can be managed without opioids. Chronic pain may respond better to pain management without opioids.  Pain is best managed with a team of providers working together.  Pain management  without opioids may include non-opioid medicines, medical treatments, physical therapy, mental health therapy, and lifestyle changes.  Tell your health care providers if your pain gets worse or is not being managed well enough.  This information is not intended to replace advice given to you by your health care provider. Make sure you discuss any questions you have with your health care provider.  Document Released: 10/09/2018 Document Revised: 10/09/2018 Document Reviewed: 10/09/2018  [employer] Patient Education © 2020 [employer]      Pain Medicine Information  Opioid pain medicines are strong medicines that are used to treat serious pain. Only take these medicines while you are working with a doctor. You should only take them for short amounts of time, if your doctor says that you can. When you take these medicines for short amounts of time, they can help you:  Do better in physical therapy.  Feel better during the first few days after an injury.  Work with your doctor to make a plan for treating your pain. Talk about:  How much pain you can expect to have.  How long you are likely to have pain.    What are the risks?  Opioid pain medicines can cause problems (side effects). Taking them for more than 3 days raises your chance of problems, such as:  Trouble pooping (constipation).  Feeling sick to your stomach (nausea).  Throwing up (vomiting).  Sleepiness.  Confusion.  Breathing problems.  Not being able to stop taking the medicine (addiction).  Taking opioid pain medicine for a long time can put you at risk for:  Car accidents.  Depression.  Heart attack.  Taking too much of the medicine (overdose).  Painful symptoms after you stop the medicine (withdrawal).  Suicide and death.    Follow these instructions at home:  Safety and storage    While you are taking opioid pain medicine:  Do not drive.  Do not use machines or power tools.  Do not sign important papers (legal documents).  Do not drink  alcohol.  Do not take sleeping pills.  Do not take care of children by yourself.  Do not do activities with climbing or being in high places, like working on a ladder.  Do not go into any water, such a lake, river, ocean, pool, or hot tub.  Keep your pain medicine locked up, or in a place where children cannot reach it.  Do not share your pain medicine with anyone.    Getting rid of leftover pills  Do not save any leftover pills. Get rid of leftover pills safely by:  Taking them to a take-back program in your area.  Bringing them to a pharmacy that has a container for throwing away pills (pill disposal).  Safely throwing them in the trash. To do this:  Mix the pills with pet poop or food scraps.  Put the mixture in a closed container or bag.  Throw the container or bag in the trash.    General instructions  Work with your doctor to find other ways to manage your pain, such as:  Physical therapy.  Massage.  Counseling.  Diet and exercise.  Meditation.  Other pain medicines.  Get your pain medicine prescription from only one doctor.  If you have been taking opioid pain medicines for more than a few weeks, do not try to stop taking them by yourself. Work with your doctor to stop.  Your doctor will help you take less and less (taper) until you are not taking the medicine at all. This can lower your chance of having painful symptoms after you stop taking the medicine.  Keep all follow-up visits as told by your doctor. This is important.    Contact a doctor if:  You have problems because of your medicines.  You need help to stop taking your medicines.  You have questions about how to use your medicines safely.    Get help right away if:  You have trouble breathing.  You have a very slow heartbeat.  You feel confused.  You are very sleepy.  You pass out (faint).  You feel sick to your stomach.  You throw up.  You have cold skin.  You have blue lips or fingers.  Your muscles are weak (limp) and your body seems floppy.  The  black centers of your eyes (pupils) are smaller than normal.  You feel like you may hurt yourself or others.    If you think that you (or somebody else) may have taken too much of an opioid pain medicine, go to your nearest emergency department or call:  Your local emergency services (911 in the [place]  The hotline of the National Poison Control Center (1-802.665.7839 in the [place]  A suicide crisis helpline, such as the National Suicide Prevention Lifeline at 1-635.116.1528. This is open 24 hours a day.    Summary  Opioid pain medicines are strong medicines that can have serious side effects if you take them for too long.  If you have pain, work with your doctor to make a plan to treat it. If you can, find other options that help you manage pain.  Get help right away if you think that you (or somebody else) may have taken too much of an opioid pain medicine.  This information is not intended to replace advice given to you by your health care provider. Make sure you discuss any questions you have with your health care provider.

## 2023-03-23 ENCOUNTER — TELEPHONE (OUTPATIENT)
Dept: PAIN MEDICINE | Facility: HOSPITAL | Age: 53
End: 2023-03-23
Payer: COMMERCIAL

## 2023-03-23 NOTE — TELEPHONE ENCOUNTER
Patient called to let Roberto Elliott CNP know that she is disposing of her long acting tramadol that she is no longer using.  Patient was given a bag at the pharmacy to use for this.

## 2023-04-03 ENCOUNTER — ANCILLARY PROCEDURE (OUTPATIENT)
Dept: RADIOLOGY | Facility: HOSPITAL | Age: 53
End: 2023-04-03
Payer: COMMERCIAL

## 2023-04-03 DIAGNOSIS — R10.9 RT FLANK PAIN: ICD-10-CM

## 2023-05-24 ENCOUNTER — OFFICE VISIT (OUTPATIENT)
Dept: NEUROLOGY | Facility: CLINIC | Age: 53
End: 2023-05-24
Payer: COMMERCIAL

## 2023-05-24 VITALS
RESPIRATION RATE: 16 BRPM | BODY MASS INDEX: 35.35 KG/M2 | DIASTOLIC BLOOD PRESSURE: 90 MMHG | HEART RATE: 76 BPM | WEIGHT: 219 LBS | SYSTOLIC BLOOD PRESSURE: 138 MMHG

## 2023-05-24 DIAGNOSIS — G43.019 INTRACTABLE MIGRAINE WITHOUT AURA AND WITHOUT STATUS MIGRAINOSUS: Primary | ICD-10-CM

## 2023-05-24 DIAGNOSIS — R90.89 ABNORMAL FINDING ON MRI OF BRAIN: ICD-10-CM

## 2023-05-24 PROCEDURE — 99214 OFFICE O/P EST MOD 30 MIN: CPT | Performed by: PSYCHIATRY & NEUROLOGY

## 2023-05-24 RX ORDER — ATOGEPANT 60 MG/1
60 TABLET ORAL DAILY
Qty: 30 TABLET | Refills: 11 | Status: SHIPPED | OUTPATIENT
Start: 2023-05-24 | End: 2024-06-25

## 2023-05-24 NOTE — PROGRESS NOTES
"Neurology History and Physical    05/24/23  8:45 AM    Chief Complaint   Patient presents with    Migraine   migraine, abnormal MR brain imaging    HPI  Ivonne Giron is a 52 y.o. woman who presents today for evaluation of headaches. She has been previously seen by Tianna Fowler CNP and I have reviewed those records in detail. She was last seen by myself via telehealth in 2020. I have never seen her in person. Per prior records:\"Patient has greater than 15 days/month with headache.  Describes as frontal, 7-10 out of 10 in severity. The pain is throbbing and stabbing. It will hurt her so much that she hears ringing in her head. She can not wear her hair in a ponytail as it hurts. The headaches do last all day long. She is positive for sensitivity to light and sound. +nausea/vomiting. Medications used/failed: Botox, propanolol, losartan, tramadol, Fioricet, gabapentin, topamax (she has had kidney stones), amitriptyline, hives, failed triptans. She has not been taking emgality as she forgets to order the medication. The last time she had the emgality was a month ago. The headaches are less severe with the emgality. She had a migraine last week and this was the first migraine she has had in a long time. She states that she had to go to clinic to have treatment. She was given diclofenac to take for the migraines. She is not taking any tylenol or ibuprofen. She inquires if there is a pill similar to emgality that she could try. She underwent MR brain in the past which showed T2 hyperintensities in the right frontal, left frontal and right centrum semi-ovale. Dr. Grimm ordered CSF analysis. Does not appear that patient went through with this. Denies any transient neurological symptoms in the past. No hx of vision loss/optic neuritis, no hx of symptoms consistent with MS.\"    Returns today for follow-up.  Last MR brain was in March 2020. This showed nonspecific white matter changes that had worsened since 2008. She " did not have repeat imaging after last visit and did not complete CSF studies that Dr. Grimm had ordered in the past.     She states that gets headache every other day. They last 30 minutes or up to all day. They are sharp pain, +photophobia/phono, + nausea/vomiting, sometimes dizziness. No tearing, nasal congestion, rhinorrhea, etc. She is not taking anything for the pain. She sometimes feels dizzy with the headaches. She feels like she gets some blurred vision periodically. This resolves after she blinks a few times. Last eye exam was over a year ago. She tried fioricet, but does not help the headaches. She states that she used to be on emgality and was helpful. She had an allergic reaction with hives the last time she took it so stopped this. She has been off this for quite some time. She was on lyrica and tramadol and hydrocodone for chronic pain. Follows with pain clinic. She states that the lyrica makes her mouth dry so she has not been taking that.      No other new neurological symptoms.     Histories:    Medical:    Past Medical History:   Diagnosis Date    Abdominal pain, chronic, generalized     Accelerated essential hypertension     Chronic pain disorder     Intercostal neuropathic pain     Migraine     Pain     surgical sight pain since gallbladder surgery         Surgical:    Past Surgical History:   Procedure Laterality Date    ANKLE SURGERY Right 02/2020    CHOLECYSTECTOMY           Family:    Family History   Problem Relation Age of Onset    Arthritis Mother     Hypertension Father     Hypertension Other     Tuberculosis Other     Arthritis Other     Diabetes Other     Heart disease Other     Migraines Other     Migraines Mother's Sister          Social:    Social History     Socioeconomic History    Marital status: Single     Spouse name: Not on file    Number of children: Not on file    Years of education: Not on file    Highest education level: Not on file   Occupational History    Not on file    Tobacco Use    Smoking status: Never    Smokeless tobacco: Never   Vaping Use    Vaping Use: Never used   Substance and Sexual Activity    Alcohol use: No    Drug use: No    Sexual activity: Defer   Other Topics Concern    Not on file   Social History Narrative    Not on file     Social Determinants of Health     Financial Resource Strain: Not on file   Food Insecurity: Not on file   Transportation Needs: Not on file   Physical Activity: Not on file   Stress: Not on file   Social Connections: Not on file   Intimate Partner Violence: Not on file   Housing Stability: Not on file       Allergies:    Allergies   Allergen Reactions    Indomethacin      HIVES    Prednisone      N/V    Sulfa (Sulfonamide Antibiotics)      HIVES       Current Medications:    Current Outpatient Medications   Medication Sig Dispense Refill    ondansetron ODT (ZOFRAN-ODT) 4 mg disintegrating tablet Take 1 tablet (4 mg total) by mouth every 8 (eight) hours as needed for nausea or vomiting 20 tablet 2    HYDROcodone-acetaminophen (NORCO) 5-325 mg per tablet Take 1 tablet by mouth every 6 (six) hours as needed (pain) for up to 28 days Max Daily Amount: 4 tablets 112 tablet 0    [START ON 5/31/2023] HYDROcodone-acetaminophen (NORCO) 5-325 mg per tablet Take 1 tablet by mouth every 6 (six) hours as needed (pain) for up to 28 days Max Daily Amount: 4 tablets 112 tablet 0    traMADol (ULTRAM 50) 50 mg tablet Take 1 tablet (50 mg total) by mouth every 6 (six) hours as needed for pain scale 8-10/10 Max Daily Amount: 200 mg 112 tablet 0    traMADol (ULTRAM 50) 50 mg tablet Take 1 tablet (50 mg total) by mouth every 6 (six) hours as needed (pain) Max Daily Amount: 200 mg 112 tablet 0    [START ON 5/31/2023] traMADol (ULTRAM 50) 50 mg tablet Take 1 tablet (50 mg total) by mouth every 6 (six) hours as needed for pain scale 8-10/10 Max Daily Amount: 200 mg 112 tablet 0    traMADoL (ULTRAM 50) 50 mg tablet TAKE 1 TO 2 TABLETS BY MOUTH EVERY 6 HOURS AS  NEEDED FOR PAIN . DO NOT EXCEED 8 PER 24 HOURS (SCALE  8-10/10) 240 tablet 0    hydrOXYzine (ATARAX) 25 mg tablet Take 0.5 tablets (12.5 mg total) by mouth 2 (two) times a day      butalbital-acetaminophen-caff (FIORICET, ESGIC) -40 mg Take 1 tablet by mouth See administration instructions      PARoxetine (PAXIL) 40 mg tablet Take 1 tablet (40 mg total) by mouth every morning      alendronate (FOSAMAX) 70 mg tablet Take 1 tablet (70 mg total) by mouth once a week      potassium chloride (KLOR-CON M20) 20 mEq CR tablet Take 1 tablet (20 mEq total) by mouth daily  1    cetirizine (ZyrTEC) 10 mg tablet Take 1 tablet (10 mg total) by mouth as needed      atorvastatin (LIPITOR) 40 mg tablet Take 1 tablet (40 mg total) by mouth nightly      ibuprofen (ADVIL,MOTRIN) 200 mg tablet Take 1 tablet (200 mg total) by mouth as needed      calcium carbonate-vitamin D3 500 mg-10 mcg (400 unit) tablet Take 1 tablet by mouth daily.        losartan (COZAAR) 100 mg tablet Take by mouth daily.        atogepant (Qulipta) 60 mg tablet Take 1 tablet (60 mg total) by mouth daily 30 tablet 11    rimegepant (Nurtec ODT) 75 mg tablet,disintegrating Take 75 mg by mouth daily as needed (migraine) Max 1 tab per 24 hours (Patient not taking: Reported on 5/24/2023) 8 tablet 11     No current facility-administered medications for this visit.       Review of Systems:  See HPI. All other systems were reviewed and were negative.     OBJECTIVE    Heart Rate:  [76] 76  Resp:  [16] 16  BP: (138)/(90) 138/90      Physical Exam:    Alert and oriented. Speech and language is normal. Face is symmetric. Moving all extremities equally. Walks with normal station and gait.      Labs:      CBC with Platelet:    Lab Results   Component Value Date    WBC 12.2 (H) 05/26/2018    HGB 13.7 05/26/2018    HCT 39.0 05/26/2018     05/26/2018    RBC 4.67 05/26/2018    MCV 83.5 05/26/2018    MCH 29.2 05/26/2018    MCHC 35.0 05/26/2018    RDW 13.6 05/26/2018     MPV 6.6 (L) 05/26/2018     Automated Differential:   Lab Results   Component Value Date    NEUTROABS 9.50 05/26/2018    LYMPHOPCT 13 05/26/2018    MONOPCT 8 05/26/2018    MONOSABS 1.00 05/26/2018    EOSPCT 0 05/26/2018    EOSABS 0.00 05/26/2018    BASOSABS 0.10 05/26/2018    BASOPCT 1 05/26/2018     Absolute Count:    Lab Results   Component Value Date    NEUTROABS 9.50 05/26/2018    LYMPHSABS 1.60 05/26/2018    EOSABS 0.00 05/26/2018     Comp:   Lab Results   Component Value Date     05/26/2018    K 2.9 (LL) 05/26/2018     05/26/2018    CO2 21 05/26/2018    BUN 9 05/26/2018    CREATININE 0.7 05/26/2018    GLUCOSE 108 (H) 05/26/2018    CALCIUM 8.4 (L) 05/26/2018    PROT 7.6 05/26/2018    ALBUMIN 3.6 05/26/2018    AST 65 (H) 05/26/2018    ALT 82 (H) 05/26/2018    ALKPHOS 189 (H) 05/26/2018    BILITOT 0.66 05/26/2018     Lipid: No results found for: CHOL, TRIG, HDLC, LDLC, HDL, LDL  TSH: No results found for: TSH    Imaging studies:    EXAM:   MRI of the brain without contrast from 03/23/2020     CLINICAL HISTORY:   Intractable migraine without aura and without status migrainosus; Worsing headaches.     COMPARISON(s):   2/13/2008     TECHNIQUE:   Sagittal and axial T1, and axial T2, FLAIR, and diffusion-weighted sequences were obtained through the brain.     FINDINGS:   Focal FLAIR hyperintense left extra-axial structure (series 4, image 17), incompletely evaluated without IV contrast but probable small meningioma.  Multifocal white matter hyperintensities involving the periventricular, deep and subcortical white matter, nonspecific but likely secondary to chronic ischemic white matter disease. The diffusion weighted images demonstrate no evidence of acute infarct. There is no hydrocephalus, acute hemorrhage, mass effect, midline shift, or extra axial fluid collection.Midline structures are within normal limits. The paranasal sinuses are clear..         IMPRESSION:  1. No acute infarct.  2. Multifocal  white matter hyperintensities involving the periventricular, deep and subcortical white matter, nonspecific but likely secondary to chronic ischemic white matter disease. This is worsened since 2008.    Assessment:    Problem List Items Addressed This Visit          Neuro    Intractable migraine without aura and without status migrainosus - Primary    Relevant Medications    atogepant (Qulipta) 60 mg tablet    Other Relevant Orders    MR brain with and without contrast    CBC w/auto differential Blood, Venous    Comprehensive metabolic panel Blood, Venous    Abnormal finding on MRI of brain    Relevant Orders    MR brain with and without contrast    CBC w/auto differential Blood, Venous    Comprehensive metabolic panel Blood, Venous           Plan:   Orders Placed This Encounter   Procedures    MR brain with and without contrast     Standing Status:   Future     Standing Expiration Date:   6/6/2024     Order Specific Question:   Is the patient claustrophobic?     Answer:   No     Order Specific Question:   Does the patient require sedation?     Answer:   No     Order Specific Question:   Does the patient have a pacemaker/defibrillator?     Answer:   No    CBC w/auto differential Blood, Venous     Standing Status:   Future     Standing Expiration Date:   5/24/2024    Comprehensive metabolic panel Blood, Venous     Standing Status:   Future     Standing Expiration Date:   6/6/2024       Assessment/Plan   1. Migraine headaches - emgality worked well, but developed hives from it so was discontinued. Will start trial of qulipta 60mg daily for preventative therapy. She is on tramadol and hydrocodone daily for pain management. Failed other abortive migraine therapy. She has fioricet, but does not work. She has not been really using this. Will consider other options pending response to qulipta.    2. Abnormal MR brain - she did not proceed with CSF analysis. Will plan to repeat MR brain imaging now. She does not have any  prior symptoms of demyelination. Suspect that these changes are secondary to chronic ischemic white matter disease rather than demyelination. Images personally reviewed.   3. Blurred vision - encouraged to arrange eye exam.     A voice recognition program was used to aid in documentation of the record. Sometimes words are not printed exactly as they were spoken. While efforts were made to carefully edit and correct any inaccuracies, some may be present; please take these into context. Please contact the provider if areas are identified.     The diagnostic assessment has been reviewed. Patient and/or patient's surrogate has expressed a good understanding of the assessment and recommendations from today's visit. There are no apparent barriers to understanding this information. Patient's questions were addressed. Patient has been advised to contact this office or the answering service with questions or concerns that may arise. Patient has been advised to follow up with Primary Care Provider for general medical needs.     Nicolette Avila MD

## 2023-06-05 ENCOUNTER — TELEPHONE (OUTPATIENT)
Dept: PAIN MEDICINE | Facility: HOSPITAL | Age: 53
End: 2023-06-05
Payer: COMMERCIAL

## 2023-06-14 RX ORDER — ONDANSETRON 4 MG/1
4 TABLET, ORALLY DISINTEGRATING ORAL EVERY 8 HOURS PRN
Qty: 20 TABLET | Refills: 2 | Status: CANCELLED | OUTPATIENT
Start: 2023-06-15

## 2023-06-14 NOTE — PROGRESS NOTES
Patient ID: Ivonne Giron is a 52 y.o. female.    Pt presents today to evaluate medication review.    CHIEF COMPLAINT:  Patient reports pain is abdominal .     HPI:  She is here for med follow-up and at the last visit she did not tolerate the long-acting tramadol so now we do tramadol 50 mg up to 4/day and Norco 5/325 up to 4/day, she also stopped gabapentin is doing low-dose Lyrica twice daily and tolerating it.  She request no changes    We have been trying to get her back into GI for her abdominal issues, she has not heard from them yet..  She tried Bentyl which made her too sleepy.    She still having issues with the right ankle after traumatic fracture and redo surgery, she still limps on it a little bit.  She is also seeing neuro for headaches and on a new medication, nurtec.        PAST PAIN HISTORY  Medications previously tried -bentyl makes her sleepy, butrans patch,hydrocodone post op ,gabapentin sleepy on high doses, tramadol ER vomiting  Other modalities completed -neurology for headaches  Past Injection List   TPI  ICNB        Pain Assessment: 0-10  Pain Score: 6 (best-5, worst-10)  Pain Type: Chronic pain  Pain Location: Abdomen  Pain Radiating Towards: across abd  Pain Descriptors: Sharp  Pain Frequency: Constant/continuous  Interference on Function: ADLs  Pain Onset: Awakened from sleep  Clinical Progression: Not changed  Aggravating Factors: Standing, Walking, Sitting  Result of Injury: No  Work-Related Injury: No  Pain Interventions: Heat applied, Cold applied, Rest, Repositioned, Medication (See MAR)  Response to Interventions: combination       /77   Pulse 68   SpO2 97%       Current Outpatient Medications:     atogepant (Qulipta) 60 mg tablet, Take 1 tablet (60 mg total) by mouth daily, Disp: 30 tablet, Rfl: 11    ondansetron ODT (ZOFRAN-ODT) 4 mg disintegrating tablet, Take 1 tablet (4 mg total) by mouth every 8 (eight) hours as needed for nausea or vomiting, Disp: 20 tablet, Rfl:  2    traMADol (ULTRAM 50) 50 mg tablet, Take 1 tablet (50 mg total) by mouth every 6 (six) hours as needed for pain scale 8-10/10 Max Daily Amount: 200 mg, Disp: 112 tablet, Rfl: 0    traMADol (ULTRAM 50) 50 mg tablet, Take 1 tablet (50 mg total) by mouth every 6 (six) hours as needed for pain scale 8-10/10 Max Daily Amount: 200 mg, Disp: 112 tablet, Rfl: 0    hydrOXYzine (ATARAX) 25 mg tablet, Take 0.5 tablets (12.5 mg total) by mouth 2 (two) times a day, Disp: , Rfl:     rimegepant (Nurtec ODT) 75 mg tablet,disintegrating, Take 75 mg by mouth daily as needed (migraine) Max 1 tab per 24 hours, Disp: 8 tablet, Rfl: 11    butalbital-acetaminophen-caff (FIORICET, ESGIC) -40 mg, Take 1 tablet by mouth See administration instructions, Disp: , Rfl:     PARoxetine (PAXIL) 40 mg tablet, Take 1 tablet (40 mg total) by mouth every morning, Disp: , Rfl:     alendronate (FOSAMAX) 70 mg tablet, Take 1 tablet (70 mg total) by mouth once a week, Disp: , Rfl:     potassium chloride (KLOR-CON M20) 20 mEq CR tablet, Take 1 tablet (20 mEq total) by mouth daily, Disp: , Rfl: 1    cetirizine (ZyrTEC) 10 mg tablet, Take 1 tablet (10 mg total) by mouth as needed, Disp: , Rfl:     atorvastatin (LIPITOR) 40 mg tablet, Take 1 tablet (40 mg total) by mouth nightly, Disp: , Rfl:     ibuprofen (ADVIL,MOTRIN) 200 mg tablet, Take 1 tablet (200 mg total) by mouth as needed, Disp: , Rfl:     calcium carbonate-vitamin D3 500 mg-10 mcg (400 unit) tablet, Take 1 tablet by mouth daily.  , Disp: , Rfl:     losartan (COZAAR) 100 mg tablet, Take by mouth daily.  , Disp: , Rfl:     [START ON 6/28/2023] traMADol (ULTRAM 50) 50 mg tablet, Take 1 tablet (50 mg total) by mouth every 6 (six) hours as needed for pain scale 8-10/10 Max Daily Amount: 200 mg, Disp: 112 tablet, Rfl: 0    [START ON 7/26/2023] traMADol (ULTRAM 50) 50 mg tablet, Take 1 tablet (50 mg total) by mouth every 6 (six) hours as needed for pain scale 8-10/10 Max Daily Amount: 200 mg,  Disp: 112 tablet, Rfl: 0    [START ON 8/23/2023] traMADol (ULTRAM 50) 50 mg tablet, Take 1 tablet (50 mg total) by mouth every 6 (six) hours as needed for pain scale 8-10/10 Max Daily Amount: 200 mg, Disp: 112 tablet, Rfl: 0    [START ON 6/28/2023] HYDROcodone-acetaminophen (NORCO) 5-325 mg per tablet, Take 1 tablet by mouth every 6 (six) hours as needed (pain) for up to 28 days Max Daily Amount: 4 tablets, Disp: 112 tablet, Rfl: 0    [START ON 7/26/2023] HYDROcodone-acetaminophen (NORCO) 5-325 mg per tablet, Take 1 tablet by mouth every 6 (six) hours as needed (pain) for up to 28 days Max Daily Amount: 4 tablets, Disp: 112 tablet, Rfl: 0    [START ON 8/23/2023] HYDROcodone-acetaminophen (NORCO) 5-325 mg per tablet, Take 1 tablet by mouth every 6 (six) hours as needed (pain) for up to 28 days Max Daily Amount: 4 tablets, Disp: 112 tablet, Rfl: 0    traMADol (ULTRAM 50) 50 mg tablet, Take 1 tablet (50 mg total) by mouth every 6 (six) hours as needed (pain) Max Daily Amount: 200 mg, Disp: 112 tablet, Rfl: 0  Past Medical History:   Diagnosis Date    Abdominal pain, chronic, generalized     Accelerated essential hypertension     Chronic pain disorder     Intercostal neuropathic pain     Migraine     Pain     surgical sight pain since gallbladder surgery      Past Surgical History:   Procedure Laterality Date    ANKLE SURGERY Right 02/2020    CHOLECYSTECTOMY        Review of Systems   Gastrointestinal:  Positive for abdominal pain.   Musculoskeletal:  Positive for gait problem and myalgias.       Imaging:  MR brain with and without contrast    Result Date: 6/12/2023  Narrative: EXAM: MR BRAIN W AND WO CONTRAST DATE: 6/12/2023 2:26 PM INDICATION: Intractable migraine without aura and without status migrainosus; Abnormal finding on MRI of brain; Demyelinating disease COMPARISON: None available. Technique: Multiplanar multisequence MR imaging the brain was obtained with and without IV contrast. 15 cc of ProHance was  administered intravenously. 0 cc of ProHance was wasted. Findings: No abnormal intracranial enhancement. No midline shift or hydrocephalus. Gray-white distinction is maintained. Vascular flow voids maintained. Cervical medullary junction appears normal. There is no diffusion restriction or acute ischemic change. Moderate left mastoid air cell effusion. Multifocal white matter hyperintensities are seen on the FLAIR sequence, nonspecific.     Impression: IMPRESSION: No acute infarct. No abnormal intracranial enhancement. Bifrontal predominant subcortical and deep white matter FLAIR white matter hyperintensities are nonspecific. This can be seen with migraines, vasculitis or atypical chronic ischemic microvascular disease. Moderate left mastoid air cell effusion.      Physical Exam  Vitals and nursing note reviewed.   Constitutional:       General: She is not in acute distress.     Appearance: She is well-developed.   HENT:      Head: Normocephalic.   Eyes:      Conjunctiva/sclera: Conjunctivae normal.   Cardiovascular:      Rate and Rhythm: Normal rate.   Pulmonary:      Effort: Pulmonary effort is normal. No respiratory distress.   Abdominal:      General: There is no distension.      Tenderness: There is no abdominal tenderness. There is no guarding or rebound.          Comments: Moves well in the room and no obvious abdominal pain during today's assessment.   Musculoskeletal:         General: Tenderness present. No deformity.      Cervical back: Normal range of motion and neck supple.      Right ankle: No swelling (has a boot on). No tenderness. Decreased range of motion.        Legs:       Comments: Gait is fairly steady and limps somewhat on the right ankle and has a shoe on and no obvious swelling    No obvious abdominal distention or pain today     Skin:     General: Skin is warm and dry.      Findings: No rash.   Neurological:      Mental Status: She is alert and oriented to person, place, and time.    Psychiatric:         Behavior: Behavior normal.         Thought Content: Thought content normal.         Judgment: Judgment normal.         ASSESSMENT  1. Abdominal pain, chronic, right upper quadrant  traMADol (ULTRAM 50) 50 mg tablet, traMADol (ULTRAM 50) 50 mg tablet, traMADol (ULTRAM 50) 50 mg tablet, HYDROcodone-acetaminophen (NORCO) 5-325 mg per tablet, HYDROcodone-acetaminophen (NORCO) 5-325 mg per tablet, HYDROcodone-acetaminophen (NORCO) 5-325 mg per tablet      2. Closed fracture of right ankle with routine healing  traMADol (ULTRAM 50) 50 mg tablet, traMADol (ULTRAM 50) 50 mg tablet, traMADol (ULTRAM 50) 50 mg tablet, HYDROcodone-acetaminophen (NORCO) 5-325 mg per tablet, HYDROcodone-acetaminophen (NORCO) 5-325 mg per tablet, HYDROcodone-acetaminophen (NORCO) 5-325 mg per tablet      3. Long-term current use of opiate analgesic  traMADol (ULTRAM 50) 50 mg tablet, traMADol (ULTRAM 50) 50 mg tablet, traMADol (ULTRAM 50) 50 mg tablet, HYDROcodone-acetaminophen (NORCO) 5-325 mg per tablet, HYDROcodone-acetaminophen (NORCO) 5-325 mg per tablet, HYDROcodone-acetaminophen (NORCO) 5-325 mg per tablet      4. Encounter for long-term opiate analgesic use  HYDROcodone-acetaminophen (NORCO) 5-325 mg per tablet, HYDROcodone-acetaminophen (NORCO) 5-325 mg per tablet, HYDROcodone-acetaminophen (NORCO) 5-325 mg per tablet      5. Chronic pain disorder  HYDROcodone-acetaminophen (NORCO) 5-325 mg per tablet, HYDROcodone-acetaminophen (NORCO) 5-325 mg per tablet, HYDROcodone-acetaminophen (NORCO) 5-325 mg per tablet             PLAN    Medication Therapy: We will go ahead and renew the Lyrica 50 mg twice daily and stay on the tramadol 50 mg up to 4/day and the Norco 5/325 up to 4/day.  Injections: none  Referrals placed: hasnt seen GI yet but saw neuro for headaches  Consider for future treatments: Unsure I really do think she needs further work-up for this abdominal pain and her heavy pill burden would be hard to take  throughout the day but she says she alternates them and sometimes takes them together depending on the day    PDMP reviewed.  MME = 40.  Narcan not done. Aberrant behavior = no. Affect/Mood = about the same. ADL = independent. Analgesia = adequate. Adverse Side Effects = no. Goals have been addressed and discussed with this patient.  The patient was advised that if at any time they feel oversedated to stop the medication and seek emergency care, I have counseled the patient regarding safe use of their opioids.. UDS and contract February 2023    DISCUSSION  Today's plan was a combined effort between the patient and myself. The patient understood the plan, verbally consented, and agreed to participate. An After Visit Summary with special instructions/information regarding today's office visit was given to the patient (see patient instructions).     The diagnostic assessment has been reviewed. Patient and/or patient's surrogate has expressed a good understanding of the assessment and recommendations from today's visit. There are no apparent barriers to understanding this information. Patient’s questions were addressed.  I have reviewed the patients current medications using all immediate resources available.     Patient has been advised to contact this office or the answering service with questions or concerns that may arise. Patient has been advised to follow up with Primary Care Provider for general medical needs.     A total of 15 minutes was required for this visit. Greater than 50% of this time was spent in direct face to face communication with the patient and/or surrogate in order to provide counseling regarding med review, her neurological exam and headaches.    Dragon voice recognition program was used to aid in this documentation which might generate inaccuracies despite efforts made to avoid them.  Please take it into context and contact the provider with any questions.    Roberto Elliott, CNP

## 2023-06-15 ENCOUNTER — OFFICE VISIT (OUTPATIENT)
Dept: PAIN MEDICINE | Facility: HOSPITAL | Age: 53
End: 2023-06-15
Payer: COMMERCIAL

## 2023-06-15 VITALS — DIASTOLIC BLOOD PRESSURE: 77 MMHG | HEART RATE: 68 BPM | OXYGEN SATURATION: 97 % | SYSTOLIC BLOOD PRESSURE: 130 MMHG

## 2023-06-15 DIAGNOSIS — Z79.891 ENCOUNTER FOR LONG-TERM OPIATE ANALGESIC USE: ICD-10-CM

## 2023-06-15 DIAGNOSIS — R10.11 ABDOMINAL PAIN, CHRONIC, RIGHT UPPER QUADRANT: ICD-10-CM

## 2023-06-15 DIAGNOSIS — G89.4 CHRONIC PAIN DISORDER: ICD-10-CM

## 2023-06-15 DIAGNOSIS — G89.29 ABDOMINAL PAIN, CHRONIC, RIGHT UPPER QUADRANT: ICD-10-CM

## 2023-06-15 DIAGNOSIS — S82.891D CLOSED FRACTURE OF RIGHT ANKLE WITH ROUTINE HEALING: ICD-10-CM

## 2023-06-15 DIAGNOSIS — Z79.891 LONG-TERM CURRENT USE OF OPIATE ANALGESIC: ICD-10-CM

## 2023-06-15 PROCEDURE — G0463 HOSPITAL OUTPT CLINIC VISIT: HCPCS

## 2023-06-15 RX ORDER — TRAMADOL HYDROCHLORIDE 50 MG/1
50 TABLET ORAL EVERY 6 HOURS PRN
Qty: 112 TABLET | Refills: 0 | Status: SHIPPED | OUTPATIENT
Start: 2023-08-23 | End: 2023-09-19 | Stop reason: SDUPTHER

## 2023-06-15 RX ORDER — PREGABALIN 50 MG/1
50 CAPSULE ORAL 2 TIMES DAILY
Qty: 60 CAPSULE | Refills: 2 | Status: SHIPPED | OUTPATIENT
Start: 2023-06-15 | End: 2023-07-15

## 2023-06-15 RX ORDER — HYDROCODONE BITARTRATE AND ACETAMINOPHEN 5; 325 MG/1; MG/1
1 TABLET ORAL EVERY 6 HOURS PRN
Qty: 112 TABLET | Refills: 0 | Status: SHIPPED | OUTPATIENT
Start: 2023-07-26 | End: 2023-08-23

## 2023-06-15 RX ORDER — HYDROCODONE BITARTRATE AND ACETAMINOPHEN 5; 325 MG/1; MG/1
1 TABLET ORAL EVERY 6 HOURS PRN
Qty: 112 TABLET | Refills: 0 | Status: SHIPPED | OUTPATIENT
Start: 2023-06-28 | End: 2023-07-26

## 2023-06-15 RX ORDER — HYDROCODONE BITARTRATE AND ACETAMINOPHEN 5; 325 MG/1; MG/1
1 TABLET ORAL EVERY 6 HOURS PRN
Qty: 112 TABLET | Refills: 0 | Status: SHIPPED | OUTPATIENT
Start: 2023-08-23 | End: 2023-09-19 | Stop reason: SDUPTHER

## 2023-06-15 RX ORDER — TRAMADOL HYDROCHLORIDE 50 MG/1
50 TABLET ORAL EVERY 6 HOURS PRN
Qty: 112 TABLET | Refills: 0 | Status: SHIPPED | OUTPATIENT
Start: 2023-06-28 | End: 2024-02-09 | Stop reason: WASHOUT

## 2023-06-15 RX ORDER — TRAMADOL HYDROCHLORIDE 50 MG/1
50 TABLET ORAL EVERY 6 HOURS PRN
Qty: 112 TABLET | Refills: 0 | Status: SHIPPED | OUTPATIENT
Start: 2023-07-26 | End: 2024-02-09 | Stop reason: WASHOUT

## 2023-06-15 ASSESSMENT — PAIN DESCRIPTION - DESCRIPTORS: DESCRIPTORS: SHARP

## 2023-06-15 ASSESSMENT — PAIN - FUNCTIONAL ASSESSMENT: PAIN_FUNCTIONAL_ASSESSMENT: 0-10

## 2023-06-15 ASSESSMENT — ENCOUNTER SYMPTOMS
MYALGIAS: 1
ABDOMINAL PAIN: 1

## 2023-06-15 ASSESSMENT — PAIN SCALES - GENERAL: PAINLEVEL: 6

## 2023-06-27 ENCOUNTER — TELEPHONE (OUTPATIENT)
Dept: PAIN MEDICINE | Facility: HOSPITAL | Age: 53
End: 2023-06-27
Payer: COMMERCIAL

## 2023-07-26 ENCOUNTER — TELEPHONE (OUTPATIENT)
Dept: PAIN MEDICINE | Facility: HOSPITAL | Age: 53
End: 2023-07-26
Payer: COMMERCIAL

## 2023-07-28 ENCOUNTER — TELEPHONE (OUTPATIENT)
Dept: PAIN MEDICINE | Facility: HOSPITAL | Age: 53
End: 2023-07-28
Payer: COMMERCIAL

## 2023-07-28 NOTE — TELEPHONE ENCOUNTER
Received request  for prior auth  for pain meds for this pt. Called pt to see if she had picked up her scripts for oxycodone and tramadol. Pt states yes and that [facility] paid for them. Prior auth not sent it due to [facility] paying for medications.

## 2023-09-05 ENCOUNTER — ANCILLARY PROCEDURE (OUTPATIENT)
Dept: RADIOLOGY | Facility: HOSPITAL | Age: 53
End: 2023-09-05
Payer: COMMERCIAL

## 2023-09-05 DIAGNOSIS — R42 DIZZINESS: ICD-10-CM

## 2023-09-11 DIAGNOSIS — Z79.891 LONG-TERM CURRENT USE OF OPIATE ANALGESIC: ICD-10-CM

## 2023-09-11 DIAGNOSIS — S82.891D CLOSED FRACTURE OF RIGHT ANKLE WITH ROUTINE HEALING: ICD-10-CM

## 2023-09-11 DIAGNOSIS — G89.29 ABDOMINAL PAIN, CHRONIC, RIGHT UPPER QUADRANT: ICD-10-CM

## 2023-09-11 DIAGNOSIS — G89.4 CHRONIC PAIN DISORDER: ICD-10-CM

## 2023-09-11 DIAGNOSIS — Z79.891 ENCOUNTER FOR LONG-TERM OPIATE ANALGESIC USE: ICD-10-CM

## 2023-09-11 DIAGNOSIS — R10.11 ABDOMINAL PAIN, CHRONIC, RIGHT UPPER QUADRANT: ICD-10-CM

## 2023-09-12 ENCOUNTER — TELEPHONE (OUTPATIENT)
Dept: PAIN MEDICINE | Facility: HOSPITAL | Age: 53
End: 2023-09-12
Payer: COMMERCIAL

## 2023-09-12 RX ORDER — TRAMADOL HYDROCHLORIDE 50 MG/1
TABLET ORAL
Qty: 112 TABLET | Refills: 0 | OUTPATIENT
Start: 2023-09-12

## 2023-09-19 ENCOUNTER — TELEPHONE (OUTPATIENT)
Dept: PAIN MEDICINE | Facility: HOSPITAL | Age: 53
End: 2023-09-19
Payer: COMMERCIAL

## 2023-09-19 RX ORDER — PREGABALIN 50 MG/1
50 CAPSULE ORAL 2 TIMES DAILY
Qty: 60 CAPSULE | Refills: 0 | Status: CANCELLED | OUTPATIENT
Start: 2023-09-20 | End: 2023-10-20

## 2023-09-20 ENCOUNTER — OFFICE VISIT (OUTPATIENT)
Dept: PAIN MEDICINE | Facility: HOSPITAL | Age: 53
End: 2023-09-20
Payer: COMMERCIAL

## 2023-09-20 VITALS — DIASTOLIC BLOOD PRESSURE: 94 MMHG | SYSTOLIC BLOOD PRESSURE: 153 MMHG | HEART RATE: 70 BPM | OXYGEN SATURATION: 93 %

## 2023-09-20 DIAGNOSIS — G89.4 CHRONIC PAIN DISORDER: ICD-10-CM

## 2023-09-20 DIAGNOSIS — R10.11 ABDOMINAL PAIN, CHRONIC, RIGHT UPPER QUADRANT: ICD-10-CM

## 2023-09-20 DIAGNOSIS — S82.891D CLOSED FRACTURE OF RIGHT ANKLE WITH ROUTINE HEALING: ICD-10-CM

## 2023-09-20 DIAGNOSIS — G89.29 ABDOMINAL PAIN, CHRONIC, RIGHT UPPER QUADRANT: ICD-10-CM

## 2023-09-20 DIAGNOSIS — Z79.891 LONG-TERM CURRENT USE OF OPIATE ANALGESIC: ICD-10-CM

## 2023-09-20 DIAGNOSIS — Z79.891 ENCOUNTER FOR LONG-TERM OPIATE ANALGESIC USE: ICD-10-CM

## 2023-09-20 PROCEDURE — G0463 HOSPITAL OUTPT CLINIC VISIT: HCPCS

## 2023-09-20 RX ORDER — HYDROCODONE BITARTRATE AND ACETAMINOPHEN 5; 325 MG/1; MG/1
1 TABLET ORAL EVERY 6 HOURS PRN
Qty: 112 TABLET | Refills: 0 | Status: SHIPPED | OUTPATIENT
Start: 2023-09-20 | End: 2023-10-18

## 2023-09-20 RX ORDER — TRAMADOL HYDROCHLORIDE 50 MG/1
50 TABLET ORAL EVERY 6 HOURS PRN
Qty: 112 TABLET | Refills: 0 | Status: SHIPPED | OUTPATIENT
Start: 2023-10-18 | End: 2023-09-20 | Stop reason: SDUPTHER

## 2023-09-20 RX ORDER — HYDROCODONE BITARTRATE AND ACETAMINOPHEN 5; 325 MG/1; MG/1
1 TABLET ORAL EVERY 6 HOURS PRN
Qty: 112 TABLET | Refills: 0 | Status: SHIPPED | OUTPATIENT
Start: 2023-10-18 | End: 2023-11-15

## 2023-09-20 RX ORDER — PREGABALIN 50 MG/1
50 CAPSULE ORAL NIGHTLY
Qty: 90 CAPSULE | Refills: 0 | Status: ON HOLD | OUTPATIENT
Start: 2023-09-20 | End: 2024-12-16

## 2023-09-20 RX ORDER — TRAMADOL HYDROCHLORIDE 50 MG/1
50 TABLET ORAL EVERY 6 HOURS PRN
Qty: 112 TABLET | Refills: 0 | Status: SHIPPED | OUTPATIENT
Start: 2023-11-15 | End: 2023-09-20 | Stop reason: SDUPTHER

## 2023-09-20 RX ORDER — TRAMADOL HYDROCHLORIDE 50 MG/1
50 TABLET ORAL EVERY 6 HOURS PRN
Qty: 112 TABLET | Refills: 0 | Status: SHIPPED | OUTPATIENT
Start: 2023-09-20 | End: 2023-09-20 | Stop reason: SDUPTHER

## 2023-09-20 RX ORDER — HYDROCODONE BITARTRATE AND ACETAMINOPHEN 5; 325 MG/1; MG/1
1 TABLET ORAL EVERY 6 HOURS PRN
Qty: 112 TABLET | Refills: 0 | Status: SHIPPED | OUTPATIENT
Start: 2023-11-15 | End: 2023-12-06 | Stop reason: SDUPTHER

## 2023-09-20 RX ORDER — TRAMADOL HYDROCHLORIDE 50 MG/1
50 TABLET ORAL EVERY 6 HOURS PRN
Qty: 112 TABLET | Refills: 0 | Status: SHIPPED | OUTPATIENT
Start: 2023-11-15 | End: 2023-12-06 | Stop reason: SDUPTHER

## 2023-09-20 RX ORDER — TRAMADOL HYDROCHLORIDE 50 MG/1
50 TABLET ORAL EVERY 6 HOURS PRN
Qty: 112 TABLET | Refills: 0 | Status: SHIPPED | OUTPATIENT
Start: 2023-10-18 | End: 2024-02-09 | Stop reason: WASHOUT

## 2023-09-20 RX ORDER — TRAMADOL HYDROCHLORIDE 50 MG/1
50 TABLET ORAL EVERY 6 HOURS PRN
Qty: 112 TABLET | Refills: 0 | Status: SHIPPED | OUTPATIENT
Start: 2023-09-20 | End: 2024-02-09 | Stop reason: WASHOUT

## 2023-09-20 ASSESSMENT — ENCOUNTER SYMPTOMS
MYALGIAS: 1
ABDOMINAL PAIN: 1

## 2023-09-20 ASSESSMENT — PAIN DESCRIPTION - DESCRIPTORS: DESCRIPTORS: DULL

## 2023-09-20 ASSESSMENT — PAIN - FUNCTIONAL ASSESSMENT: PAIN_FUNCTIONAL_ASSESSMENT: 0-10

## 2023-09-20 NOTE — PROGRESS NOTES
"Patient ID: Ivonne Giron is a 52 y.o. female.    Pt presents today to evaluate medication review.    CHIEF COMPLAINT:  Patient reports pain is abdomen and right ankle.     HPI:  She is here for med follow-up and at the last visit she did not tolerate the long-acting tramadol so now we do tramadol 50 mg up to 4/day and Norco 5/325 up to 4/day, she also stopped gabapentin and was suppsoed to be doing low-dose Lyrica but \"didn't pick it up\".  She request no changes to her tramadol and hydrocodone.     We have been trying to get her back into GI for her abdominal issues, she has not heard from them yet..  She tried Bentyl which made her too sleepy.    She still having issues with the right ankle after traumatic fracture and redo surgery, she still limps on it a little bit.  She is also seeing neuro for headaches and on a new medication, nurtec.        PAST PAIN HISTORY  Medications previously tried -bentyl makes her sleepy, butrans patch,hydrocodone post op ,gabapentin sleepy on high doses, tramadol ER vomiting  Other modalities completed -neurology for headaches  Past Injection List   TPI  ICNB        Pain Assessment: 0-10  Pain Score: 6  Pain Type: Chronic pain  Pain Location: Abdomen  Pain Radiating Towards: across the stomach  Pain Descriptors: Dull  Pain Frequency: Constant/continuous  Interference on Function: adl's  Pain Onset: Awakened from sleep  Clinical Progression: Not changed  Aggravating Factors: Other (Comment) (to much moving)  Pain Interventions: Medication (See MAR), Rest, Home medication (sitting and laying down, tylenol)  Response to Interventions: combination at times     /94 (BP Location: Left arm, Patient Position: Sitting, Cuff Size: Regular Adult)   Pulse 70   SpO2 93%       Current Outpatient Medications:     traMADol (ULTRAM 50) 50 mg tablet, Take 1 tablet (50 mg total) by mouth every 6 (six) hours as needed for pain scale 8-10/10 Max Daily Amount: 200 mg, Disp: 112 tablet, Rfl: " 0    traMADol (ULTRAM 50) 50 mg tablet, Take 1 tablet (50 mg total) by mouth every 6 (six) hours as needed for pain scale 8-10/10 Max Daily Amount: 200 mg, Disp: 112 tablet, Rfl: 0    traMADol (ULTRAM 50) 50 mg tablet, Take 1 tablet (50 mg total) by mouth every 6 (six) hours as needed for pain scale 8-10/10 Max Daily Amount: 200 mg, Disp: 112 tablet, Rfl: 0    HYDROcodone-acetaminophen (NORCO) 5-325 mg per tablet, Take 1 tablet by mouth every 6 (six) hours as needed (pain) for up to 28 days Max Daily Amount: 4 tablets, Disp: 112 tablet, Rfl: 0    atogepant (Qulipta) 60 mg tablet, Take 1 tablet (60 mg total) by mouth daily, Disp: 30 tablet, Rfl: 11    ondansetron ODT (ZOFRAN-ODT) 4 mg disintegrating tablet, Take 1 tablet (4 mg total) by mouth every 8 (eight) hours as needed for nausea or vomiting, Disp: 20 tablet, Rfl: 2    traMADol (ULTRAM 50) 50 mg tablet, Take 1 tablet (50 mg total) by mouth every 6 (six) hours as needed for pain scale 8-10/10 Max Daily Amount: 200 mg, Disp: 112 tablet, Rfl: 0    traMADol (ULTRAM 50) 50 mg tablet, Take 1 tablet (50 mg total) by mouth every 6 (six) hours as needed for pain scale 8-10/10 Max Daily Amount: 200 mg, Disp: 112 tablet, Rfl: 0    hydrOXYzine (ATARAX) 25 mg tablet, Take 0.5 tablets (12.5 mg total) by mouth 2 (two) times a day, Disp: , Rfl:     rimegepant (Nurtec ODT) 75 mg tablet,disintegrating, Take 75 mg by mouth daily as needed (migraine) Max 1 tab per 24 hours, Disp: 8 tablet, Rfl: 11    butalbital-acetaminophen-caff (FIORICET, ESGIC) -40 mg, Take 1 tablet by mouth See administration instructions, Disp: , Rfl:     PARoxetine (PAXIL) 40 mg tablet, Take 1 tablet (40 mg total) by mouth every morning, Disp: , Rfl:     alendronate (FOSAMAX) 70 mg tablet, Take 1 tablet (70 mg total) by mouth once a week, Disp: , Rfl:     potassium chloride (KLOR-CON M20) 20 mEq CR tablet, Take 1 tablet (20 mEq total) by mouth daily, Disp: , Rfl: 1    cetirizine (ZyrTEC) 10 mg tablet,  Take 1 tablet (10 mg total) by mouth as needed, Disp: , Rfl:     atorvastatin (LIPITOR) 40 mg tablet, Take 1 tablet (40 mg total) by mouth nightly, Disp: , Rfl:     ibuprofen (ADVIL,MOTRIN) 200 mg tablet, Take 1 tablet (200 mg total) by mouth as needed, Disp: , Rfl:     calcium carbonate-vitamin D3 500 mg-10 mcg (400 unit) tablet, Take 1 tablet by mouth daily.  , Disp: , Rfl:     losartan (COZAAR) 100 mg tablet, Take by mouth daily.  , Disp: , Rfl:     pregabalin (LYRICA) 50 mg capsule, Take 1 capsule (50 mg total) by mouth 2 (two) times a day Max Daily Amount: 100 mg, Disp: 60 capsule, Rfl: 2    traMADol (ULTRAM 50) 50 mg tablet, Take 1 tablet (50 mg total) by mouth every 6 (six) hours as needed (pain) Max Daily Amount: 200 mg, Disp: 112 tablet, Rfl: 0  Past Medical History:   Diagnosis Date    Abdominal pain, chronic, generalized     Accelerated essential hypertension     Chronic pain disorder     Intercostal neuropathic pain     Migraine     Pain     surgical sight pain since gallbladder surgery      Past Surgical History:   Procedure Laterality Date    ANKLE SURGERY Right 02/2020    CHOLECYSTECTOMY        Review of Systems   Gastrointestinal:  Positive for abdominal pain.   Musculoskeletal:  Positive for gait problem and myalgias.       Imaging:  CT head without IV contrast    Result Date: 9/5/2023  Narrative: EXAM: CT HEAD WO IV CONTRAST DATE: 9/5/2023 3:41 PM INDICATION: Dizziness; COMPARISON: Brain MRI 6/12/2023. Technique: Noncontrast CT images of the head. Dose reduction technique utilized; automatic/anatomic modulation of X-ray tube current (Auto mA). Findings: The ventricles and other CSF spaces are nondilated. There is no midline shift or herniation. Patchy white matter hypodensities with preserved grey-white matter differentiation. No intracranial hemorrhage or extraaxial fluid collection. No calvarial fracture or large scalp hematoma. Normal globes and orbital soft tissues. Visualized paranasal sinuses  and mastoid are cells are well-aerated.     Impression: IMPRESSION: No acute intracranial hemorrhage or mass effect.      Physical Exam  Vitals and nursing note reviewed.   Constitutional:       General: She is not in acute distress.     Appearance: She is well-developed.   HENT:      Head: Normocephalic.   Eyes:      Conjunctiva/sclera: Conjunctivae normal.   Cardiovascular:      Rate and Rhythm: Normal rate.   Pulmonary:      Effort: Pulmonary effort is normal. No respiratory distress.   Abdominal:      General: There is no distension.      Tenderness: There is no abdominal tenderness. There is no guarding or rebound.          Comments: Moves well in the room and no obvious abdominal pain during today's assessment.   Musculoskeletal:         General: Tenderness present. No deformity.      Cervical back: Normal range of motion and neck supple.      Right ankle: No swelling (has a boot on). No tenderness. Decreased range of motion.        Legs:       Comments: Gait is fairly steady and limps somewhat on the right ankle and has a shoe on and no obvious swelling    No obvious abdominal distention or pain today     Skin:     General: Skin is warm and dry.      Findings: No rash.   Neurological:      Mental Status: She is alert and oriented to person, place, and time.   Psychiatric:         Behavior: Behavior normal.         Thought Content: Thought content normal.         Judgment: Judgment normal.         ASSESSMENT  1. Abdominal pain, chronic, right upper quadrant        2. Closed fracture of right ankle with routine healing        3. Long-term current use of opiate analgesic        4. Encounter for long-term opiate analgesic use        5. Chronic pain disorder               PLAN    Medication Therapy: I think she should try low-dose Lyrica for her ongoing ankle pain and usual abdominal pain cell keep at 50 mg daily and she can take it at night or daily if needed and stay on up to 4 a day of the tramadol and  hydrocodone and she is picking them up today.  Injections: None  Referrals placed: None but we briefly talked about her Nurtec and headaches as her daughter is having headaches now  Consider for future treatments: Unsure I really like her to see GI or try other injections and the only difference that she has done in the last few years is change her medications    PDMP reviewed.  MME = 40.  Narcan not done. Aberrant behavior = no. Affect/Mood = about the same. ADL = independent. Analgesia = adequate. Adverse Side Effects = no. Goals have been addressed and discussed with this patient.  The patient was advised that if at any time they feel oversedated to stop the medication and seek emergency care, I have counseled the patient regarding safe use of their opioids.. UDS and contract February 2023    DISCUSSION  Today's plan was a combined effort between the patient and myself. The patient understood the plan, verbally consented, and agreed to participate. An After Visit Summary with special instructions/information regarding today's office visit was given to the patient (see patient instructions).     The diagnostic assessment has been reviewed. Patient and/or patient's surrogate has expressed a good understanding of the assessment and recommendations from today's visit. There are no apparent barriers to understanding this information. Patient’s questions were addressed.  I have reviewed the patients current medications using all immediate resources available.     Patient has been advised to contact this office or the answering service with questions or concerns that may arise. Patient has been advised to follow up with Primary Care Provider for general medical needs.     A total of 15 minutes was required for this visit. Greater than 50% of this time was spent in direct face to face communication with the patient and/or surrogate in order to provide counseling regarding medications and adding Lyrica and foot  pain.    Dragon voice recognition program was used to aid in this documentation which might generate inaccuracies despite efforts made to avoid them.  Please take it into context and contact the provider with any questions.    Roberto Elliott, CNP

## 2023-09-20 NOTE — TELEPHONE ENCOUNTER
Received call from patricia pharmacist at [facility]. Tramadol order not have earliest fill date on them so we will need to resend.

## 2023-12-06 RX ORDER — PREGABALIN 50 MG/1
50 CAPSULE ORAL NIGHTLY
Qty: 90 CAPSULE | Refills: 0 | Status: CANCELLED | OUTPATIENT
Start: 2023-12-08 | End: 2024-03-07

## 2023-12-07 NOTE — PROGRESS NOTES
"Patient ID: Ivonne Giron is a 53 y.o. female.    Pt presents today to evaluate medication review.    CHIEF COMPLAINT:  Patient reports pain is abdominal pain.     HPI:  She is here for med follow-up and she had an abdominal work up finally with GI (I've sent many referrals) and she had a scope and biopsy and now with more abdominal pain post biopsy (it burns slightly) and still wants to stay on both the tramadol and hydrocodone off and on for this pain instead of just tramadol.  She took tramadol for many years and now using hydrcodone alternating with tramadol and this has helped her more lately.        She stopped gabapentin and doesn't want to do lyrica -\"not picked up\"  at other visits.  and at the last visit she did not tolerate the long-acting tramadol so now we do tramadol 50 mg up to 4/day and Norco 5/325 up to 4/day, she also stopped gabapentin and was suppsoed to be doing low-dose Lyrica but \"didn't pick it up\".  She request no changes to her tramadol and hydrocodone.     She still having issues with the right ankle after traumatic fracture and redo surgery, she still limps on it a little bit.  She is also seeing neuro for headaches and on a new medication and trying qulipta.        PAST PAIN HISTORY  Medications previously tried -bentyl makes her sleepy, butrans patch,hydrocodone post op ,gabapentin sleepy on high doses, tramadol ER vomiting,bentyl made her sleepy  Other modalities completed -neurology for headaches  Past Injection List   TPI  ICNB      Pain Assessment: 0-10  Pain Score: 5 - Moderate pain  Pain Type: Chronic pain  Pain Location: Abdomen  Pain Radiating Towards: toward the sides of the body  Pain Descriptors: Sharp, Dull  Pain Frequency: Constant/continuous  Interference on Function: ADL's  Pain Onset: Awakened from sleep  Clinical Progression: Not changed  Aggravating Factors: Other (Comment) (move around to much)  Pain Interventions: Medication (See MAR), Rest  Response to " Interventions: meds and rest       /85 (BP Location: Right arm, Patient Position: Sitting, Cuff Size: Long Adult)   Pulse 73   SpO2 93%       Current Outpatient Medications:     pregabalin (Lyrica) 50 mg capsule, Take 1 capsule (50 mg total) by mouth nightly Max Daily Amount: 50 mg, Disp: 90 capsule, Rfl: 0    traMADol (ULTRAM 50) 50 mg tablet, Take 1 tablet (50 mg total) by mouth every 6 (six) hours as needed for pain scale 8-10/10 Max Daily Amount: 200 mg, Disp: 112 tablet, Rfl: 0    traMADol (ULTRAM 50) 50 mg tablet, Take 1 tablet (50 mg total) by mouth every 6 (six) hours as needed for pain scale 8-10/10 Max Daily Amount: 200 mg, Disp: 112 tablet, Rfl: 0    atogepant (Qulipta) 60 mg tablet, Take 1 tablet (60 mg total) by mouth daily, Disp: 30 tablet, Rfl: 11    ondansetron ODT (ZOFRAN-ODT) 4 mg disintegrating tablet, Take 1 tablet (4 mg total) by mouth every 8 (eight) hours as needed for nausea or vomiting, Disp: 20 tablet, Rfl: 2    traMADol (ULTRAM 50) 50 mg tablet, Take 1 tablet (50 mg total) by mouth every 6 (six) hours as needed for pain scale 8-10/10 Max Daily Amount: 200 mg, Disp: 112 tablet, Rfl: 0    traMADol (ULTRAM 50) 50 mg tablet, Take 1 tablet (50 mg total) by mouth every 6 (six) hours as needed for pain scale 8-10/10 Max Daily Amount: 200 mg, Disp: 112 tablet, Rfl: 0    hydrOXYzine (ATARAX) 25 mg tablet, Take 0.5 tablets (12.5 mg total) by mouth 2 (two) times a day, Disp: , Rfl:     rimegepant (Nurtec ODT) 75 mg tablet,disintegrating, Take 75 mg by mouth daily as needed (migraine) Max 1 tab per 24 hours, Disp: 8 tablet, Rfl: 11    butalbital-acetaminophen-caff (FIORICET, ESGIC) -40 mg, Take 1 tablet by mouth See administration instructions, Disp: , Rfl:     PARoxetine (PAXIL) 40 mg tablet, Take 1 tablet (40 mg total) by mouth every morning, Disp: , Rfl:     alendronate (FOSAMAX) 70 mg tablet, Take 1 tablet (70 mg total) by mouth once a week, Disp: , Rfl:     potassium chloride  (KLOR-CON M20) 20 mEq CR tablet, Take 1 tablet (20 mEq total) by mouth daily, Disp: , Rfl: 1    cetirizine (ZyrTEC) 10 mg tablet, Take 1 tablet (10 mg total) by mouth as needed, Disp: , Rfl:     atorvastatin (LIPITOR) 40 mg tablet, Take 1 tablet (40 mg total) by mouth nightly, Disp: , Rfl:     ibuprofen (ADVIL,MOTRIN) 200 mg tablet, Take 1 tablet (200 mg total) by mouth as needed, Disp: , Rfl:     calcium carbonate-vitamin D3 500 mg-10 mcg (400 unit) tablet, Take 1 tablet by mouth daily.  , Disp: , Rfl:     losartan (COZAAR) 100 mg tablet, Take by mouth daily.  , Disp: , Rfl:     [START ON 12/11/2023] traMADol (ULTRAM 50) 50 mg tablet, Take 1 tablet (50 mg total) by mouth every 6 (six) hours as needed for pain scale 8-10/10 for up to 28 days Max Daily Amount: 200 mg, Disp: 112 tablet, Rfl: 0    [START ON 1/8/2024] traMADol (ULTRAM 50) 50 mg tablet, Take 1 tablet (50 mg total) by mouth every 6 (six) hours as needed for pain scale 8-10/10 for up to 28 days Max Daily Amount: 200 mg, Disp: 112 tablet, Rfl: 0    [START ON 2/5/2024] traMADol (ULTRAM 50) 50 mg tablet, Take 1 tablet (50 mg total) by mouth every 6 (six) hours as needed for pain scale 8-10/10 for up to 28 days Max Daily Amount: 200 mg, Disp: 112 tablet, Rfl: 0    [START ON 12/11/2023] HYDROcodone-acetaminophen (NORCO) 5-325 mg per tablet, Take 1 tablet by mouth every 6 (six) hours as needed (pain) for up to 28 days Max Daily Amount: 4 tablets, Disp: 112 tablet, Rfl: 0    [START ON 1/8/2024] HYDROcodone-acetaminophen (NORCO) 5-325 mg per tablet, Take 1 tablet by mouth every 6 (six) hours as needed (pain) for up to 28 days Max Daily Amount: 4 tablets, Disp: 112 tablet, Rfl: 0    [START ON 2/5/2024] HYDROcodone-acetaminophen (NORCO) 5-325 mg per tablet, Take 1 tablet by mouth every 6 (six) hours as needed (pain) for up to 28 days Max Daily Amount: 4 tablets, Disp: 112 tablet, Rfl: 0    traMADol (ULTRAM 50) 50 mg tablet, Take 1 tablet (50 mg total) by mouth every  6 (six) hours as needed for pain scale 8-10/10 for up to 28 days Max Daily Amount: 200 mg, Disp: 112 tablet, Rfl: 0    traMADol (ULTRAM 50) 50 mg tablet, Take 1 tablet (50 mg total) by mouth every 6 (six) hours as needed for pain scale 8-10/10 for up to 28 days Max Daily Amount: 200 mg, Disp: 112 tablet, Rfl: 0    pregabalin (LYRICA) 50 mg capsule, Take 1 capsule (50 mg total) by mouth 2 (two) times a day Max Daily Amount: 100 mg, Disp: 60 capsule, Rfl: 2    traMADol (ULTRAM 50) 50 mg tablet, Take 1 tablet (50 mg total) by mouth every 6 (six) hours as needed (pain) Max Daily Amount: 200 mg, Disp: 112 tablet, Rfl: 0  Past Medical History:   Diagnosis Date    Abdominal pain, chronic, generalized     Accelerated essential hypertension     Chronic pain disorder     Intercostal neuropathic pain     Migraine     Pain     surgical sight pain since gallbladder surgery      Past Surgical History:   Procedure Laterality Date    ANKLE SURGERY Right 02/2020    CHOLECYSTECTOMY        Review of Systems   Gastrointestinal:  Positive for abdominal pain.   Musculoskeletal:  Positive for gait problem and myalgias.       Imaging:  No results found.    Physical Exam  Vitals and nursing note reviewed.   Constitutional:       General: She is not in acute distress.     Appearance: She is well-developed.   HENT:      Head: Normocephalic.   Eyes:      Conjunctiva/sclera: Conjunctivae normal.   Cardiovascular:      Rate and Rhythm: Normal rate.   Pulmonary:      Effort: Pulmonary effort is normal. No respiratory distress.   Abdominal:      General: There is no distension.      Tenderness: There is no abdominal tenderness. There is no guarding or rebound.          Comments: Moves well in the room and no obvious abdominal pain during today's assessment.   Musculoskeletal:         General: Tenderness present. No deformity.      Cervical back: Normal range of motion and neck supple.      Right ankle: No swelling (has a boot on). No tenderness.  Decreased range of motion.        Legs:       Comments: Gait is fairly steady and limps somewhat on the right ankle and has a shoe on and no obvious swelling    No obvious abdominal distention or pain today     Skin:     General: Skin is warm and dry.      Findings: No rash.   Neurological:      Mental Status: She is alert and oriented to person, place, and time.   Psychiatric:         Behavior: Behavior normal.         Thought Content: Thought content normal.         Judgment: Judgment normal.         ASSESSMENT  1. Abdominal pain, chronic, right upper quadrant  traMADol (ULTRAM 50) 50 mg tablet, traMADol (ULTRAM 50) 50 mg tablet, traMADol (ULTRAM 50) 50 mg tablet, HYDROcodone-acetaminophen (NORCO) 5-325 mg per tablet, HYDROcodone-acetaminophen (NORCO) 5-325 mg per tablet, HYDROcodone-acetaminophen (NORCO) 5-325 mg per tablet      2. Closed fracture of right ankle with routine healing  traMADol (ULTRAM 50) 50 mg tablet, traMADol (ULTRAM 50) 50 mg tablet, traMADol (ULTRAM 50) 50 mg tablet, HYDROcodone-acetaminophen (NORCO) 5-325 mg per tablet, HYDROcodone-acetaminophen (NORCO) 5-325 mg per tablet, HYDROcodone-acetaminophen (NORCO) 5-325 mg per tablet      3. Long-term current use of opiate analgesic  traMADol (ULTRAM 50) 50 mg tablet, traMADol (ULTRAM 50) 50 mg tablet, traMADol (ULTRAM 50) 50 mg tablet, HYDROcodone-acetaminophen (NORCO) 5-325 mg per tablet, HYDROcodone-acetaminophen (NORCO) 5-325 mg per tablet, HYDROcodone-acetaminophen (NORCO) 5-325 mg per tablet      4. Encounter for long-term opiate analgesic use  HYDROcodone-acetaminophen (NORCO) 5-325 mg per tablet, HYDROcodone-acetaminophen (NORCO) 5-325 mg per tablet, HYDROcodone-acetaminophen (NORCO) 5-325 mg per tablet      5. Chronic pain disorder  HYDROcodone-acetaminophen (NORCO) 5-325 mg per tablet, HYDROcodone-acetaminophen (NORCO) 5-325 mg per tablet, HYDROcodone-acetaminophen (NORCO) 5-325 mg per tablet             PLAN    Medication Therapy: keep on  tramadol and hydrocodone as its more effective than just tramadol and she might have improvement when GI treats her and biopsy is back, she will call with any changes, she is aware of the need to only take 1 short acting but has like this change as the tramadol at high numbers didn't always work well   Injections: none  Referrals placed: none but talked about her GI   Consider for future treatments: unsure we might finally know how her abdomen is working and why she has abdominal pain as we have treated her for many years for this and she has failed many other treatments    PDMP  reviewed.  MME =60.  Narcannot done. Aberrant behavior = no. Affect/Mood = about the same. ADL = independent. Analgesia = adequate. Adverse Side Effects = no. Goals have been addressed and discussed with this patient.  The patient was advised that if at any time they feel oversedated to stop the medication and seek emergency care, I have counseled the patient regarding safe use of their opioids.. UDS and contract 02/23    DISCUSSION  Today's plan was a combined effort between the patient and myself. The patient understood the plan, verbally consented, and agreed to participate. An After Visit Summary with special instructions/information regarding today's office visit was given to the patient (see patient instructions).     The diagnostic assessment has been reviewed. Patient and/or patient's surrogate has expressed a good understanding of the assessment and recommendations from today's visit. There are no apparent barriers to understanding this information. Patient’s questions were addressed.  I have reviewed the patients current medications using all immediate resources available.     Patient has been advised to contact this office or the answering service with questions or concerns that may arise. Patient has been advised to follow up with Primary Care Provider for general medical needs.     A total of 15 minutes was required for this  visit. Greater than 50% of this time was spent in direct face to face communication with the patient and/or surrogate in order to provide counseling regardingstaying on her current meds fornow and thinking about only 1 med and her GI workup .    Dragon voice recognition program was used to aid in this documentation which might generate inaccuracies despite efforts made to avoid them.  Please take it into context and contact the provider with any questions.    Roberto Elliott, CNP

## 2023-12-08 ENCOUNTER — OFFICE VISIT (OUTPATIENT)
Dept: PAIN MEDICINE | Facility: HOSPITAL | Age: 53
End: 2023-12-08
Payer: COMMERCIAL

## 2023-12-08 VITALS — DIASTOLIC BLOOD PRESSURE: 85 MMHG | OXYGEN SATURATION: 93 % | SYSTOLIC BLOOD PRESSURE: 141 MMHG | HEART RATE: 73 BPM

## 2023-12-08 DIAGNOSIS — R10.11 ABDOMINAL PAIN, CHRONIC, RIGHT UPPER QUADRANT: ICD-10-CM

## 2023-12-08 DIAGNOSIS — Z79.891 LONG-TERM CURRENT USE OF OPIATE ANALGESIC: ICD-10-CM

## 2023-12-08 DIAGNOSIS — G89.29 ABDOMINAL PAIN, CHRONIC, RIGHT UPPER QUADRANT: ICD-10-CM

## 2023-12-08 DIAGNOSIS — G89.4 CHRONIC PAIN DISORDER: ICD-10-CM

## 2023-12-08 DIAGNOSIS — S82.891D CLOSED FRACTURE OF RIGHT ANKLE WITH ROUTINE HEALING: ICD-10-CM

## 2023-12-08 DIAGNOSIS — Z79.891 ENCOUNTER FOR LONG-TERM OPIATE ANALGESIC USE: ICD-10-CM

## 2023-12-08 PROCEDURE — G0463 HOSPITAL OUTPT CLINIC VISIT: HCPCS

## 2023-12-08 RX ORDER — TRAMADOL HYDROCHLORIDE 50 MG/1
50 TABLET ORAL EVERY 6 HOURS PRN
Qty: 112 TABLET | Refills: 0 | Status: SHIPPED | OUTPATIENT
Start: 2023-12-11 | End: 2024-02-09 | Stop reason: WASHOUT

## 2023-12-08 RX ORDER — HYDROCODONE BITARTRATE AND ACETAMINOPHEN 5; 325 MG/1; MG/1
1 TABLET ORAL EVERY 6 HOURS PRN
Qty: 112 TABLET | Refills: 0 | Status: SHIPPED | OUTPATIENT
Start: 2024-01-08 | End: 2024-02-05

## 2023-12-08 RX ORDER — HYDROCODONE BITARTRATE AND ACETAMINOPHEN 5; 325 MG/1; MG/1
1 TABLET ORAL EVERY 6 HOURS PRN
Qty: 112 TABLET | Refills: 0 | Status: SHIPPED | OUTPATIENT
Start: 2023-12-11 | End: 2024-01-08

## 2023-12-08 RX ORDER — HYDROCODONE BITARTRATE AND ACETAMINOPHEN 5; 325 MG/1; MG/1
1 TABLET ORAL EVERY 6 HOURS PRN
Qty: 112 TABLET | Refills: 0 | Status: SHIPPED | OUTPATIENT
Start: 2024-02-05 | End: 2024-03-04

## 2023-12-08 RX ORDER — TRAMADOL HYDROCHLORIDE 50 MG/1
50 TABLET ORAL EVERY 6 HOURS PRN
Qty: 112 TABLET | Refills: 0 | Status: ON HOLD | OUTPATIENT
Start: 2024-02-05 | End: 2024-12-16 | Stop reason: WASHOUT

## 2023-12-08 RX ORDER — TRAMADOL HYDROCHLORIDE 50 MG/1
50 TABLET ORAL EVERY 6 HOURS PRN
Qty: 112 TABLET | Refills: 0 | Status: SHIPPED | OUTPATIENT
Start: 2024-01-08 | End: 2024-02-09 | Stop reason: WASHOUT

## 2023-12-08 ASSESSMENT — ENCOUNTER SYMPTOMS
MYALGIAS: 1
ABDOMINAL PAIN: 1

## 2023-12-08 ASSESSMENT — PAIN DESCRIPTION - DESCRIPTORS: DESCRIPTORS: SHARP;DULL

## 2023-12-08 ASSESSMENT — PAIN - FUNCTIONAL ASSESSMENT: PAIN_FUNCTIONAL_ASSESSMENT: 0-10

## 2024-06-24 DIAGNOSIS — G43.019 INTRACTABLE MIGRAINE WITHOUT AURA AND WITHOUT STATUS MIGRAINOSUS: ICD-10-CM

## 2024-06-25 RX ORDER — ATOGEPANT 60 MG/1
1 TABLET ORAL DAILY
Qty: 30 TABLET | Refills: 0 | Status: SHIPPED | OUTPATIENT
Start: 2024-06-25 | End: 2024-07-22

## 2024-07-22 DIAGNOSIS — G43.019 INTRACTABLE MIGRAINE WITHOUT AURA AND WITHOUT STATUS MIGRAINOSUS: ICD-10-CM

## 2024-07-22 RX ORDER — ATOGEPANT 60 MG/1
1 TABLET ORAL DAILY
Qty: 30 TABLET | Refills: 5 | Status: ON HOLD | OUTPATIENT
Start: 2024-07-22 | End: 2024-12-16

## 2024-08-28 ENCOUNTER — HOSPITAL ENCOUNTER (OUTPATIENT)
Dept: MRI IMAGING | Facility: HOSPITAL | Age: 54
Discharge: HOME | End: 2024-08-28
Payer: COMMERCIAL

## 2024-08-28 DIAGNOSIS — R10.9 ABDOMINAL PAIN, UNSPECIFIED ABDOMINAL LOCATION: ICD-10-CM

## 2024-08-28 PROCEDURE — 74181 MRI ABDOMEN W/O CONTRAST: CPT

## 2024-11-16 ENCOUNTER — ANCILLARY PROCEDURE (OUTPATIENT)
Dept: RADIOLOGY | Facility: HOSPITAL | Age: 54
End: 2024-11-16
Payer: COMMERCIAL

## 2024-12-15 ENCOUNTER — APPOINTMENT (OUTPATIENT)
Dept: CT IMAGING | Facility: HOSPITAL | Age: 54
DRG: 065 | End: 2024-12-15
Payer: COMMERCIAL

## 2024-12-15 ENCOUNTER — APPOINTMENT (OUTPATIENT)
Dept: RADIOLOGY | Facility: HOSPITAL | Age: 54
DRG: 065 | End: 2024-12-15
Payer: COMMERCIAL

## 2024-12-15 ENCOUNTER — HOSPITAL ENCOUNTER (INPATIENT)
Facility: HOSPITAL | Age: 54
LOS: 2 days | Discharge: HOME | DRG: 065 | End: 2024-12-18
Attending: EMERGENCY MEDICINE | Admitting: STUDENT IN AN ORGANIZED HEALTH CARE EDUCATION/TRAINING PROGRAM
Payer: COMMERCIAL

## 2024-12-15 DIAGNOSIS — R09.89 SUSPECTED CEREBROVASCULAR ACCIDENT (CVA): Primary | ICD-10-CM

## 2024-12-15 DIAGNOSIS — R53.1 LEFT-SIDED WEAKNESS: ICD-10-CM

## 2024-12-15 DIAGNOSIS — E87.6 HYPOKALEMIA: ICD-10-CM

## 2024-12-15 LAB
ALBUMIN SERPL-MCNC: 4 G/DL (ref 3.5–5.3)
ALP SERPL-CCNC: 110 U/L (ref 41–108)
ALT SERPL-CCNC: 13 U/L (ref 7–52)
AMPHET UR QL SCN: ABNORMAL
ANION GAP SERPL CALC-SCNC: 10 MMOL/L (ref 3–11)
APTT PPP: 32.6 SECONDS (ref 25.1–36.5)
AST SERPL-CCNC: 14 U/L
BARBITURATES UR QL SCN: POSITIVE
BENZODIAZ UR QL SCN: ABNORMAL
BILIRUB SERPL-MCNC: 0.47 MG/DL (ref 0.2–1.4)
BUN SERPL-MCNC: 5 MG/DL (ref 7–25)
CALCIUM ALBUM COR SERPL-MCNC: 8.7 MG/DL (ref 8.6–10.3)
CALCIUM SERPL-MCNC: 8.7 MG/DL (ref 8.6–10.3)
CANNABINOIDS UR QL SCN: ABNORMAL
CHLORIDE SERPL-SCNC: 110 MMOL/L (ref 98–107)
CO2 SERPL-SCNC: 24 MMOL/L (ref 21–32)
COCAINE UR QL SCN: ABNORMAL
CREAT SERPL-MCNC: 0.51 MG/DL (ref 0.6–1.1)
EGFRCR SERPLBLD CKD-EPI 2021: 110 ML/MIN/1.73M*2
ERYTHROCYTE [DISTWIDTH] IN BLOOD BY AUTOMATED COUNT: 13.9 % (ref 11.5–14)
GLUCOSE SERPL-MCNC: 112 MG/DL (ref 70–105)
HCG UR QL: NEGATIVE
HCT VFR BLD AUTO: 42 % (ref 34–45)
HGB BLD-MCNC: 14.8 G/DL (ref 11.5–15.5)
INR BLD: 1.1
MCH RBC QN AUTO: 28.9 PG (ref 28–33)
MCHC RBC AUTO-ENTMCNC: 35.2 G/DL (ref 32–36)
MCV RBC AUTO: 82.2 FL (ref 81–97)
METHADONE UR QL SCN: ABNORMAL
METHAMPHET UR QL SCN: ABNORMAL
OPIATES UR QL SCN: ABNORMAL
OXYCODONE UR QL SCN: ABNORMAL
PCP UR QL SCN: ABNORMAL
PLATELET # BLD AUTO: 298 10*3/UL (ref 140–350)
PMV BLD AUTO: 6.6 FL (ref 6.9–10.8)
POTASSIUM SERPL-SCNC: 3.2 MMOL/L (ref 3.5–5.1)
PROT SERPL-MCNC: 7.7 G/DL (ref 6–8.3)
PROTHROMBIN TIME: 12.2 SECONDS (ref 9.4–12.5)
RBC # BLD AUTO: 5.11 10*6/UL (ref 3.7–5.3)
SODIUM SERPL-SCNC: 144 MMOL/L (ref 135–145)
TRICYCLICS UR QL SCN: ABNORMAL
TROPONIN I SERPL-MCNC: 6.7 PG/ML
WBC # BLD AUTO: 11.1 10*3/UL (ref 4.5–10.5)

## 2024-12-15 PROCEDURE — 85610 PROTHROMBIN TIME: CPT | Performed by: STUDENT IN AN ORGANIZED HEALTH CARE EDUCATION/TRAINING PROGRAM

## 2024-12-15 PROCEDURE — 80053 COMPREHEN METABOLIC PANEL: CPT | Performed by: STUDENT IN AN ORGANIZED HEALTH CARE EDUCATION/TRAINING PROGRAM

## 2024-12-15 PROCEDURE — 71045 X-RAY EXAM CHEST 1 VIEW: CPT

## 2024-12-15 PROCEDURE — 81025 URINE PREGNANCY TEST: CPT | Performed by: STUDENT IN AN ORGANIZED HEALTH CARE EDUCATION/TRAINING PROGRAM

## 2024-12-15 PROCEDURE — 36415 COLL VENOUS BLD VENIPUNCTURE: CPT | Performed by: STUDENT IN AN ORGANIZED HEALTH CARE EDUCATION/TRAINING PROGRAM

## 2024-12-15 PROCEDURE — 99223 1ST HOSP IP/OBS HIGH 75: CPT | Performed by: INTERNAL MEDICINE

## 2024-12-15 PROCEDURE — 2550000100 HC RX 255: Performed by: STUDENT IN AN ORGANIZED HEALTH CARE EDUCATION/TRAINING PROGRAM

## 2024-12-15 PROCEDURE — 84484 ASSAY OF TROPONIN QUANT: CPT | Performed by: STUDENT IN AN ORGANIZED HEALTH CARE EDUCATION/TRAINING PROGRAM

## 2024-12-15 PROCEDURE — 81001 URINALYSIS AUTO W/SCOPE: CPT | Performed by: INTERNAL MEDICINE

## 2024-12-15 PROCEDURE — 85730 THROMBOPLASTIN TIME PARTIAL: CPT | Performed by: STUDENT IN AN ORGANIZED HEALTH CARE EDUCATION/TRAINING PROGRAM

## 2024-12-15 PROCEDURE — 70450 CT HEAD/BRAIN W/O DYE: CPT

## 2024-12-15 PROCEDURE — 2580000300 HC RX 258: Performed by: STUDENT IN AN ORGANIZED HEALTH CARE EDUCATION/TRAINING PROGRAM

## 2024-12-15 PROCEDURE — 70498 CT ANGIOGRAPHY NECK: CPT

## 2024-12-15 PROCEDURE — 99285 EMERGENCY DEPT VISIT HI MDM: CPT | Performed by: EMERGENCY MEDICINE

## 2024-12-15 PROCEDURE — 80306 DRUG TEST PRSMV INSTRMNT: CPT | Performed by: EMERGENCY MEDICINE

## 2024-12-15 PROCEDURE — 85027 COMPLETE CBC AUTOMATED: CPT | Performed by: STUDENT IN AN ORGANIZED HEALTH CARE EDUCATION/TRAINING PROGRAM

## 2024-12-15 PROCEDURE — 93005 ELECTROCARDIOGRAM TRACING: CPT | Performed by: STUDENT IN AN ORGANIZED HEALTH CARE EDUCATION/TRAINING PROGRAM

## 2024-12-15 PROCEDURE — 82947 ASSAY GLUCOSE BLOOD QUANT: CPT | Mod: QW

## 2024-12-15 RX ORDER — BISACODYL 10 MG/1
10 SUPPOSITORY RECTAL DAILY PRN
Status: DISCONTINUED | OUTPATIENT
Start: 2024-12-15 | End: 2024-12-18 | Stop reason: HOSPADM

## 2024-12-15 RX ORDER — ONDANSETRON HYDROCHLORIDE 2 MG/ML
4 INJECTION, SOLUTION INTRAVENOUS EVERY 6 HOURS PRN
Status: DISCONTINUED | OUTPATIENT
Start: 2024-12-15 | End: 2024-12-18 | Stop reason: HOSPADM

## 2024-12-15 RX ORDER — SODIUM CHLORIDE 0.9 % (FLUSH) 0.9 %
3 SYRINGE (ML) INJECTION AS NEEDED
Status: DISCONTINUED | OUTPATIENT
Start: 2024-12-15 | End: 2024-12-18 | Stop reason: HOSPADM

## 2024-12-15 RX ORDER — ACETAMINOPHEN 325 MG/1
650 TABLET ORAL EVERY 6 HOURS PRN
Status: DISCONTINUED | OUTPATIENT
Start: 2024-12-15 | End: 2024-12-18 | Stop reason: HOSPADM

## 2024-12-15 RX ORDER — IOPAMIDOL 755 MG/ML
80 INJECTION, SOLUTION INTRAVASCULAR ONCE
Status: COMPLETED | OUTPATIENT
Start: 2024-12-15 | End: 2024-12-15

## 2024-12-15 RX ORDER — POTASSIUM CHLORIDE 750 MG/1
40 TABLET, FILM COATED, EXTENDED RELEASE ORAL ONCE
Status: COMPLETED | OUTPATIENT
Start: 2024-12-16 | End: 2024-12-16

## 2024-12-15 RX ORDER — ATORVASTATIN CALCIUM 40 MG/1
40 TABLET, FILM COATED ORAL NIGHTLY
Status: DISCONTINUED | OUTPATIENT
Start: 2024-12-16 | End: 2024-12-18 | Stop reason: HOSPADM

## 2024-12-15 RX ORDER — TALC
3 POWDER (GRAM) TOPICAL NIGHTLY PRN
Status: DISCONTINUED | OUTPATIENT
Start: 2024-12-15 | End: 2024-12-18 | Stop reason: HOSPADM

## 2024-12-15 RX ORDER — LABETALOL HYDROCHLORIDE 5 MG/ML
10 INJECTION, SOLUTION INTRAVENOUS EVERY 4 HOURS PRN
Status: DISCONTINUED | OUTPATIENT
Start: 2024-12-15 | End: 2024-12-18 | Stop reason: HOSPADM

## 2024-12-15 RX ORDER — SODIUM CHLORIDE 9 MG/ML
500 INJECTION, SOLUTION INTRAVENOUS ONCE
Status: COMPLETED | OUTPATIENT
Start: 2024-12-15 | End: 2024-12-15

## 2024-12-15 RX ORDER — SODIUM CHLORIDE 0.9 % (FLUSH) 0.9 %
2 SYRINGE (ML) INJECTION EVERY 8 HOURS SCHEDULED
Status: DISCONTINUED | OUTPATIENT
Start: 2024-12-15 | End: 2024-12-18 | Stop reason: HOSPADM

## 2024-12-15 RX ORDER — SODIUM CHLORIDE 0.9 % (FLUSH) 0.9 %
2 SYRINGE (ML) INJECTION EVERY 8 HOURS SCHEDULED
Status: DISCONTINUED | OUTPATIENT
Start: 2024-12-15 | End: 2024-12-15

## 2024-12-15 RX ORDER — PSYLLIUM HUSK 0.4 G
1 CAPSULE ORAL DAILY
Status: DISCONTINUED | OUTPATIENT
Start: 2024-12-16 | End: 2024-12-18 | Stop reason: HOSPADM

## 2024-12-15 RX ORDER — ENOXAPARIN SODIUM 100 MG/ML
40 INJECTION SUBCUTANEOUS
Status: DISCONTINUED | OUTPATIENT
Start: 2024-12-15 | End: 2024-12-18 | Stop reason: HOSPADM

## 2024-12-15 RX ORDER — ONDANSETRON 4 MG/1
4 TABLET, FILM COATED ORAL EVERY 6 HOURS PRN
Status: DISCONTINUED | OUTPATIENT
Start: 2024-12-15 | End: 2024-12-18 | Stop reason: HOSPADM

## 2024-12-15 RX ORDER — OXYCODONE HYDROCHLORIDE 5 MG/1
5 TABLET ORAL EVERY 4 HOURS PRN
Status: DISCONTINUED | OUTPATIENT
Start: 2024-12-15 | End: 2024-12-18 | Stop reason: HOSPADM

## 2024-12-15 RX ORDER — PAROXETINE HYDROCHLORIDE 20 MG/1
40 TABLET, FILM COATED ORAL EVERY MORNING
Status: DISCONTINUED | OUTPATIENT
Start: 2024-12-16 | End: 2024-12-16

## 2024-12-15 RX ORDER — SODIUM CHLORIDE 0.9 % (FLUSH) 0.9 %
2 SYRINGE (ML) INJECTION AS NEEDED
Status: DISCONTINUED | OUTPATIENT
Start: 2024-12-15 | End: 2024-12-18 | Stop reason: HOSPADM

## 2024-12-15 RX ORDER — ALUMINUM HYDROXIDE, MAGNESIUM HYDROXIDE, AND SIMETHICONE 1200; 120; 1200 MG/30ML; MG/30ML; MG/30ML
30 SUSPENSION ORAL 3 TIMES DAILY PRN
Status: DISCONTINUED | OUTPATIENT
Start: 2024-12-15 | End: 2024-12-18 | Stop reason: HOSPADM

## 2024-12-15 RX ORDER — POTASSIUM CHLORIDE 750 MG/1
20 TABLET, FILM COATED, EXTENDED RELEASE ORAL DAILY
Status: DISCONTINUED | OUTPATIENT
Start: 2024-12-16 | End: 2024-12-16

## 2024-12-15 RX ORDER — LOSARTAN POTASSIUM 50 MG/1
100 TABLET ORAL DAILY
Status: DISCONTINUED | OUTPATIENT
Start: 2024-12-16 | End: 2024-12-16

## 2024-12-15 RX ORDER — ASPIRIN 81 MG/1
81 TABLET ORAL DAILY
Status: DISCONTINUED | OUTPATIENT
Start: 2024-12-16 | End: 2024-12-18 | Stop reason: HOSPADM

## 2024-12-15 RX ORDER — POLYETHYLENE GLYCOL (3350) 17 G/17G
17 POWDER, FOR SOLUTION ORAL DAILY
Status: DISCONTINUED | OUTPATIENT
Start: 2024-12-16 | End: 2024-12-18 | Stop reason: HOSPADM

## 2024-12-15 RX ORDER — PREGABALIN 50 MG/1
50 CAPSULE ORAL NIGHTLY
Status: DISCONTINUED | OUTPATIENT
Start: 2024-12-15 | End: 2024-12-16

## 2024-12-15 RX ORDER — SODIUM CHLORIDE 9 MG/ML
100 INJECTION, SOLUTION INTRAVENOUS CONTINUOUS
Status: ACTIVE | OUTPATIENT
Start: 2024-12-15 | End: 2024-12-16

## 2024-12-15 RX ORDER — SODIUM CHLORIDE 0.9 % (FLUSH) 0.9 %
2 SYRINGE (ML) INJECTION AS NEEDED
Status: DISCONTINUED | OUTPATIENT
Start: 2024-12-15 | End: 2024-12-15

## 2024-12-15 RX ADMIN — IOPAMIDOL 80 ML: 755 INJECTION, SOLUTION INTRAVENOUS at 22:35

## 2024-12-15 RX ADMIN — Medication 2 ML: at 23:35

## 2024-12-15 RX ADMIN — SODIUM CHLORIDE 500 ML: 9 INJECTION, SOLUTION INTRAVENOUS at 22:25

## 2024-12-15 RX ADMIN — Medication 2 ML: at 22:25

## 2024-12-16 ENCOUNTER — APPOINTMENT (OUTPATIENT)
Dept: CARDIOLOGY | Facility: HOSPITAL | Age: 54
DRG: 065 | End: 2024-12-16
Payer: COMMERCIAL

## 2024-12-16 ENCOUNTER — APPOINTMENT (OUTPATIENT)
Dept: MRI IMAGING | Facility: HOSPITAL | Age: 54
DRG: 065 | End: 2024-12-16
Payer: COMMERCIAL

## 2024-12-16 PROBLEM — E87.6 HYPOKALEMIA: Status: ACTIVE | Noted: 2024-12-16

## 2024-12-16 PROBLEM — R53.1 LEFT-SIDED WEAKNESS: Status: ACTIVE | Noted: 2024-12-16

## 2024-12-16 LAB
ANION GAP SERPL CALC-SCNC: 10 MMOL/L (ref 3–11)
ASCENDING AORTA: 3.23 CM
AV LVOT PEAK GRADIENT: 3.1 MMHG
AV MEAN GRADIENT: 3.74 MMHG
AV PEAK GRADIENT: 7.18 MMHG
BACTERIA #/AREA URNS HPF: NORMAL /HPF
BASOPHILS # BLD AUTO: 0.1 10*3/UL
BASOPHILS NFR BLD AUTO: 0.8 % (ref 0–2)
BILIRUB UR QL: NEGATIVE
BSA FOR ECHO PROCEDURE: 2.19 M2
BUN SERPL-MCNC: 5 MG/DL (ref 7–25)
CALCIUM SERPL-MCNC: 8.6 MG/DL (ref 8.6–10.3)
CHLORIDE SERPL-SCNC: 108 MMOL/L (ref 98–107)
CLARITY UR: CLEAR
CO2 SERPL-SCNC: 26 MMOL/L (ref 21–32)
COLOR UR: COLORLESS
CREAT SERPL-MCNC: 0.48 MG/DL (ref 0.6–1.1)
DELTA HIGH SENSITIVITY TROPONIN I, 1 HOUR: 0.1
DELTA HIGH SENSITIVITY TROPONIN I, 2 HOUR: -0.3
DELTA HIGH SENSITIVITY TROPONIN I, 2 HOUR: 0.5
DOP CALC AO PEAK VEL: 1.34 M/S
DOP CALC AO VTI: 28 CM
DOP CALC LVOT DIAMETER: 2.27 CM
DOP CALC LVOT STROKE VOLUME: 83 CM3
DOP CALC MV VTI: 19.57 CM
DOP CALC RVOT PEAK VEL: 0.6 M/S
DOP CALCLVOT PEAK VEL VTI: 20.4 CM
E/A RATIO: 0.8
E/E' RATIO (AVERAGE): 6.3
E/E' RATIO: 6.8
ECHO EF ESTIMATED: 58 %
EGFRCR SERPLBLD CKD-EPI 2021: 112 ML/MIN/1.73M*2
EJECTION FRACTION: 57 %
EOSINOPHIL # BLD AUTO: 0.2 10*3/UL
EOSINOPHIL NFR BLD AUTO: 2.6 % (ref 0–3)
ERYTHROCYTE [DISTWIDTH] IN BLOOD BY AUTOMATED COUNT: 14 % (ref 11.5–14)
GLUCOSE BLDC GLUCOMTR-MCNC: 106 MG/DL (ref 70–105)
GLUCOSE SERPL-MCNC: 105 MG/DL (ref 70–105)
GLUCOSE UR QL: NEGATIVE MG/DL
HCT VFR BLD AUTO: 39.4 % (ref 34–45)
HGB BLD-MCNC: 13.8 G/DL (ref 11.5–15.5)
HGB UR QL: NEGATIVE
INTERVENTRICULAR SEPTUM: 0.8 CM (ref 0.6–1.1)
KETONES UR-MCNC: NEGATIVE MG/DL
LA AREA A4C SYSTOLE: 43.6 CM3
LEFT ATRIUM SIZE: 3.66 CM
LEFT ATRIUM VOLUME INDEX: 22 ML/M2
LEFT ATRIUM VOLUME: 46 CM3
LEFT INTERNAL DIMENSION IN SYSTOLE: 2.6 CM (ref 2.1–4)
LEFT VENTRICLE DIASTOLIC VOLUME: 107 CM3
LEFT VENTRICLE SYSTOLIC VOLUME: 46 CM3
LEFT VENTRICULAR INTERNAL DIMENSION IN DIASTOLE: 3.8 CM (ref 3.5–6)
LEUKOCYTE ESTERASE UR QL STRIP: NEGATIVE
LVAD-AP2: 29.4 CM2
LYMPHOCYTES # BLD AUTO: 2.6 10*3/UL
LYMPHOCYTES NFR BLD AUTO: 29.5 % (ref 11–47)
MAGNESIUM SERPL-MCNC: 2 MG/DL (ref 1.8–2.4)
MCH RBC QN AUTO: 29.3 PG (ref 28–33)
MCHC RBC AUTO-ENTMCNC: 35.1 G/DL (ref 32–36)
MCV RBC AUTO: 83.4 FL (ref 81–97)
ML OF DILUTED DEFINITY INJECTED: 2 ML
MONOCYTES # BLD AUTO: 0.6 10*3/UL
MONOCYTES NFR BLD AUTO: 7.3 % (ref 3–11)
MV DEC SLOPE: 1550 MM/S2
MV DT: 291 MS
MV MEAN GRADIENT: 1 MMHG
MV PEAK A VEL: 59 CM/S
MV PEAK E VEL: 50 CM/S
MV PEAK GRADIENT: 2 MMHG
MV STENOSIS PRESSURE HALF TIME: 104 MS
MV VALVE AREA P 1/2 METHOD: 2.12 CM2
MV VMAX: 76 CM/S
MVA (VTI): 4.22 CM2
NEUTROPHILS # BLD AUTO: 5.3 10*3/UL
NEUTROPHILS NFR BLD AUTO: 59.8 % (ref 41–81)
NITRITE UR QL: NEGATIVE
PH UR: 7 PH
PHOSPHATE SERPL-MCNC: 3.2 MG/DL (ref 2.5–4.9)
PLATELET # BLD AUTO: 275 10*3/UL (ref 140–350)
PMV BLD AUTO: 6.7 FL (ref 6.9–10.8)
POSTERIOR WALL: 1 CM (ref 0.6–1.1)
POTASSIUM SERPL-SCNC: 2.9 MMOL/L (ref 3.5–5.1)
POTASSIUM SERPL-SCNC: 3 MMOL/L (ref 3.5–5.1)
PROT UR STRIP-MCNC: NEGATIVE MG/DL
PULM VEIN S/D RATIO: 0.9
PV PEAK D VEL: 28 CM/S
PV PEAK GRADIENT: 2.43 MMHG
PV PEAK S VEL: 26 CM/S
PV VMAX: 0.78 M/S
RA AREA: 11.8 CM2
RBC # BLD AUTO: 4.72 10*6/UL (ref 3.7–5.3)
RBC #/AREA URNS HPF: NORMAL /HPF
RH CV ECHO AV VALVE AREA VEL: 2.7 CM2
RH CV ECHO AV VALVE AREA VTI: 3 CM2
RH LVOT PEAK VELOCITY REST: 0.9 M/S
RIGHT VENTRICULAR INTERNAL DIMENSION IN DIASTOLE: 3 CM
RV AP4 BASE: 3.1 CM
S': 11.1 CM/S
SODIUM SERPL-SCNC: 144 MMOL/L (ref 135–145)
SP GR UR: 1.01 (ref 1–1.03)
SQUAMOUS #/AREA URNS HPF: NORMAL /HPF
TDI: 7.4 CM/S
TDILATERAL: 8.6 CM/S
TRICUSPID ANNULAR PLANE SYSTOLIC EXCURSION: 2.2 CM
TROPONIN I SERPL-MCNC: 5.2 PG/ML
TROPONIN I SERPL-MCNC: 5.2 PG/ML
TROPONIN I SERPL-MCNC: 5.3 PG/ML
TROPONIN I SERPL-MCNC: 5.5 PG/ML
TROPONIN I SERPL-MCNC: 5.7 PG/ML
UROBILINOGEN UR-MCNC: NORMAL MG/DL
WBC # BLD AUTO: 8.9 10*3/UL (ref 4.5–10.5)
WBC #/AREA URNS HPF: NORMAL /HPF
WBC CLUMPS #/AREA URNS HPF: NORMAL /HPF

## 2024-12-16 PROCEDURE — 93005 ELECTROCARDIOGRAM TRACING: CPT | Performed by: NURSE PRACTITIONER

## 2024-12-16 PROCEDURE — 51798 US URINE CAPACITY MEASURE: CPT

## 2024-12-16 PROCEDURE — 84484 ASSAY OF TROPONIN QUANT: CPT | Performed by: NURSE PRACTITIONER

## 2024-12-16 PROCEDURE — 84132 ASSAY OF SERUM POTASSIUM: CPT | Performed by: STUDENT IN AN ORGANIZED HEALTH CARE EDUCATION/TRAINING PROGRAM

## 2024-12-16 PROCEDURE — 99232 SBSQ HOSP IP/OBS MODERATE 35: CPT | Performed by: STUDENT IN AN ORGANIZED HEALTH CARE EDUCATION/TRAINING PROGRAM

## 2024-12-16 PROCEDURE — 97535 SELF CARE MNGMENT TRAINING: CPT | Mod: GO

## 2024-12-16 PROCEDURE — 93010 ELECTROCARDIOGRAM REPORT: CPT | Performed by: INTERNAL MEDICINE

## 2024-12-16 PROCEDURE — 2500000200 HC RX 250 WO HCPCS: Performed by: INTERNAL MEDICINE

## 2024-12-16 PROCEDURE — 1110000100 HC ROOM PRIVATE W TELE

## 2024-12-16 PROCEDURE — 2550000100 HC RX 255: Performed by: INTERNAL MEDICINE

## 2024-12-16 PROCEDURE — 2550000100 HC RX 255: Performed by: STUDENT IN AN ORGANIZED HEALTH CARE EDUCATION/TRAINING PROGRAM

## 2024-12-16 PROCEDURE — 36415 COLL VENOUS BLD VENIPUNCTURE: CPT | Performed by: INTERNAL MEDICINE

## 2024-12-16 PROCEDURE — 83735 ASSAY OF MAGNESIUM: CPT | Performed by: INTERNAL MEDICINE

## 2024-12-16 PROCEDURE — 6370000100 HC RX 637 (ALT 250 FOR IP): Performed by: INTERNAL MEDICINE

## 2024-12-16 PROCEDURE — 84484 ASSAY OF TROPONIN QUANT: CPT | Performed by: INTERNAL MEDICINE

## 2024-12-16 PROCEDURE — 93306 TTE W/DOPPLER COMPLETE: CPT | Mod: 26 | Performed by: INTERNAL MEDICINE

## 2024-12-16 PROCEDURE — 80048 BASIC METABOLIC PNL TOTAL CA: CPT | Performed by: INTERNAL MEDICINE

## 2024-12-16 PROCEDURE — 84100 ASSAY OF PHOSPHORUS: CPT | Performed by: INTERNAL MEDICINE

## 2024-12-16 PROCEDURE — 6360000200 HC RX 636 W HCPCS (ALT 250 FOR IP): Performed by: NURSE PRACTITIONER

## 2024-12-16 PROCEDURE — 70553 MRI BRAIN STEM W/O & W/DYE: CPT

## 2024-12-16 PROCEDURE — A9579 GAD-BASE MR CONTRAST NOS,1ML: HCPCS | Performed by: STUDENT IN AN ORGANIZED HEALTH CARE EDUCATION/TRAINING PROGRAM

## 2024-12-16 PROCEDURE — 2500000200 HC RX 250 WO HCPCS: Performed by: NURSE PRACTITIONER

## 2024-12-16 PROCEDURE — 6370000100 HC RX 637 (ALT 250 FOR IP): Performed by: STUDENT IN AN ORGANIZED HEALTH CARE EDUCATION/TRAINING PROGRAM

## 2024-12-16 PROCEDURE — 2590000100 HC RX 259: Performed by: NURSE PRACTITIONER

## 2024-12-16 PROCEDURE — 85025 COMPLETE CBC W/AUTO DIFF WBC: CPT | Performed by: INTERNAL MEDICINE

## 2024-12-16 PROCEDURE — 93306 TTE W/DOPPLER COMPLETE: CPT

## 2024-12-16 PROCEDURE — 83735 ASSAY OF MAGNESIUM: CPT | Performed by: NURSE PRACTITIONER

## 2024-12-16 PROCEDURE — 2580000300 HC RX 258: Performed by: INTERNAL MEDICINE

## 2024-12-16 PROCEDURE — 97165 OT EVAL LOW COMPLEX 30 MIN: CPT | Mod: GO

## 2024-12-16 PROCEDURE — 6360000200 HC RX 636 W HCPCS (ALT 250 FOR IP): Performed by: INTERNAL MEDICINE

## 2024-12-16 RX ORDER — ALENDRONATE SODIUM 70 MG/1
70 TABLET ORAL
COMMUNITY

## 2024-12-16 RX ORDER — OMEPRAZOLE 40 MG/1
40 CAPSULE, DELAYED RELEASE ORAL EVERY MORNING
Status: ON HOLD | COMMUNITY
End: 2024-12-18

## 2024-12-16 RX ORDER — SODIUM CHLORIDE 9 MG/ML
10 INJECTION INTRAMUSCULAR; INTRAVENOUS; SUBCUTANEOUS ONCE
Status: COMPLETED | OUTPATIENT
Start: 2024-12-16 | End: 2024-12-16

## 2024-12-16 RX ORDER — POTASSIUM CHLORIDE 750 MG/1
40 TABLET, FILM COATED, EXTENDED RELEASE ORAL ONCE
Status: COMPLETED | OUTPATIENT
Start: 2024-12-16 | End: 2024-12-16

## 2024-12-16 RX ORDER — ATOGEPANT 60 MG/1
60 TABLET ORAL DAILY
COMMUNITY
End: 2025-01-06

## 2024-12-16 RX ORDER — TRAMADOL HYDROCHLORIDE 50 MG/1
50 TABLET ORAL EVERY 6 HOURS PRN
COMMUNITY

## 2024-12-16 RX ORDER — HYDROCODONE BITARTRATE AND ACETAMINOPHEN 5; 325 MG/1; MG/1
1 TABLET ORAL EVERY 6 HOURS PRN
COMMUNITY

## 2024-12-16 RX ORDER — METHOCARBAMOL 500 MG/1
500 TABLET, FILM COATED ORAL EVERY 8 HOURS PRN
COMMUNITY

## 2024-12-16 RX ORDER — SUCRALFATE 1 G/1
1 TABLET ORAL 2 TIMES DAILY
COMMUNITY

## 2024-12-16 RX ORDER — MORPHINE SULFATE 2 MG/ML
2 INJECTION, SOLUTION INTRAMUSCULAR; INTRAVENOUS ONCE
Status: COMPLETED | OUTPATIENT
Start: 2024-12-16 | End: 2024-12-16

## 2024-12-16 RX ADMIN — PERFLUTREN 2 ML: 6.52 INJECTION, SUSPENSION INTRAVENOUS at 10:30

## 2024-12-16 RX ADMIN — POTASSIUM BICARBONATE 40 MEQ: 782 TABLET, EFFERVESCENT ORAL at 19:30

## 2024-12-16 RX ADMIN — Medication 2 ML: at 22:00

## 2024-12-16 RX ADMIN — SODIUM CHLORIDE 100 ML/HR: 9 INJECTION, SOLUTION INTRAVENOUS at 01:46

## 2024-12-16 RX ADMIN — Medication 2 ML: at 06:00

## 2024-12-16 RX ADMIN — ALUMINUM HYDROXIDE, MAGNESIUM HYDROXIDE, AND SIMETHICONE 45 ML: 1200; 120; 1200 SUSPENSION ORAL at 20:15

## 2024-12-16 RX ADMIN — LABETALOL HYDROCHLORIDE 10 MG: 5 INJECTION INTRAVENOUS at 19:35

## 2024-12-16 RX ADMIN — SODIUM CHLORIDE 10 ML: 9 INJECTION, SOLUTION INTRAMUSCULAR; INTRAVENOUS; SUBCUTANEOUS at 10:30

## 2024-12-16 RX ADMIN — Medication 3 MG: at 01:39

## 2024-12-16 RX ADMIN — POTASSIUM CHLORIDE 40 MEQ: 750 TABLET, EXTENDED RELEASE ORAL at 01:39

## 2024-12-16 RX ADMIN — ENOXAPARIN SODIUM 40 MG: 100 INJECTION SUBCUTANEOUS at 13:30

## 2024-12-16 RX ADMIN — ONDANSETRON 4 MG: 2 INJECTION INTRAMUSCULAR; INTRAVENOUS at 19:59

## 2024-12-16 RX ADMIN — MORPHINE SULFATE 2 MG: 2 INJECTION, SOLUTION INTRAMUSCULAR; INTRAVENOUS at 20:16

## 2024-12-16 RX ADMIN — ATORVASTATIN CALCIUM 40 MG: 40 TABLET, FILM COATED ORAL at 19:30

## 2024-12-16 RX ADMIN — ASPIRIN 81 MG: 81 TABLET ORAL at 09:21

## 2024-12-16 RX ADMIN — Medication 2 ML: at 14:00

## 2024-12-16 RX ADMIN — GADOTERIDOL 20 ML: 279.3 INJECTION, SOLUTION INTRAVENOUS at 06:36

## 2024-12-16 RX ADMIN — ACETAMINOPHEN 650 MG: 325 TABLET ORAL at 19:27

## 2024-12-16 RX ADMIN — Medication 1 TABLET: at 16:52

## 2024-12-16 RX ADMIN — POTASSIUM CHLORIDE 40 MEQ: 750 TABLET, EXTENDED RELEASE ORAL at 17:27

## 2024-12-16 RX ADMIN — OXYCODONE HYDROCHLORIDE 5 MG: 5 TABLET ORAL at 09:22

## 2024-12-16 RX ADMIN — ATORVASTATIN CALCIUM 40 MG: 40 TABLET, FILM COATED ORAL at 01:39

## 2024-12-16 RX ADMIN — OXYCODONE HYDROCHLORIDE 5 MG: 5 TABLET ORAL at 01:39

## 2024-12-16 RX ADMIN — OXYCODONE HYDROCHLORIDE 5 MG: 5 TABLET ORAL at 16:52

## 2024-12-16 ASSESSMENT — ACTIVITIES OF DAILY LIVING (ADL)
ASSISTIVE_DEVICES: EYEGLASSES
ADEQUATE_TO_COMPLETE_ADL: YES

## 2024-12-16 NOTE — PROGRESS NOTES
Brief Neurology Note    I did not see the patient in person but discussed the case with OS, Dr Guevara, and Dr Davidson here. Patient presented initially for L side tingling. At some point in the OSH ED, some concern for mental status changes which prompted transfer here for CTA head and neck to rule out LVO.   Patient was not a candidate for TNK at OSH due to being outside the time window. Here, CTA head and neck negative per my review but radiology read pending.   On discussion with the ED team, patient apparently improved as compared to OSH's description of her exam.     At this time, patient should be admitted for monitoring and further workup. On review of the chart, possible history of demyelination, vasculitis, vs chronic migraine or microvascular disease for which she has had workup.     Recommendations:  --she was already loaded with aspirin and plavix at OSH; continue aspirin 81 mg mg daily tomorrow  --MRI brain with and without contrast  --permissive HTN  --monitor on tele  --may consider routine eeg tomorrow  --neurochecks per unit routine

## 2024-12-16 NOTE — H&P
Chief complaint: Left arm weakness.    HPI  Ivonne Giron is a 54 y.o. female, with reported PMHx of hypertension, chronic pain syndrome, chronic right upper quadrant abdominal pain, migraines, and obesity with BMI of 36 is transferred from [facility] via [facility] with stroke alert.  Patient went to bed last night midnight without any symptoms, woke up at 9 AM this morning with left arm weakness and chest pain.  Patient also was concerned with a left-sided facial droop around noon.  Patient was also noted to be confused in the evening and was taken to the ER.  At [facility] patient was given aspirin 81 mg and Plavix 300 mg.  20 meq of oral potassium was given.  At [facility] labs hemoglobin 14.6, WBC 11.8, lactate was 2.2.     At [facility] ER, blood pressure 181/109, heart rate of 79 temperature 98.1 °F saturation is 95% on 4 L of oxygen.  Labs with potassium 3.2, chloride 110, creatinine 0.51, glucose of 112.  WBC 11.1 hemoglobin 14.8 platelets 298.  CTA head and neck negative for acute findings.  Neurology Dr. Grullon consulted by the ER.      Past Medical History:   Diagnosis Date    Abdominal pain, chronic, generalized     Accelerated essential hypertension     Chronic pain disorder     Intercostal neuropathic pain     Migraine     Pain     surgical sight pain since gallbladder surgery          Past Surgical History:   Procedure Laterality Date    ANKLE SURGERY Right 02/2020    CHOLECYSTECTOMY           Social History:  Ivonne Giron  reports that she has never smoked. She has never used smokeless tobacco. She reports that she does not drink alcohol and does not use drugs.    Family History:  family history includes Arthritis in her mother and another family member; Diabetes in an other family member; Heart disease in an other family member; Hypertension in her father and another family member; Migraines in her mother's sister and another family member; Tuberculosis in an other family  member.      Allergies:  Allergies   Allergen Reactions    Indomethacin      HIVES    Prednisone      N/V    Sulfa (Sulfonamide Antibiotics)      HIVES       Medications:   Prior to Admission medications    Medication Sig Start Date End Date Taking? Authorizing Provider   atogepant (Qulipta) 60 mg tablet TAKE 1 TABLET BY MOUTH ONCE DAILY . APPOINTMENT REQUIRED FOR FUTURE REFILLS 7/22/24   Nicolette Avila MD   traMADol (ULTRAM 50) 50 mg tablet Take 1 tablet (50 mg total) by mouth every 6 (six) hours as needed for pain scale 8-10/10 for up to 28 days Max Daily Amount: 200 mg 2/5/24 3/4/24  Roberto Elliott CNP   pregabalin (Lyrica) 50 mg capsule Take 1 capsule (50 mg total) by mouth nightly Max Daily Amount: 50 mg 9/20/23 12/19/23  Roberto Elliott CNP   ondansetron ODT (ZOFRAN-ODT) 4 mg disintegrating tablet Take 1 tablet (4 mg total) by mouth every 8 (eight) hours as needed for nausea or vomiting 3/22/23   Roberto Elliott CNP   hydrOXYzine (ATARAX) 25 mg tablet Take 0.5 tablets (12.5 mg total) by mouth 2 (two) times a day 8/11/21   Jordana Alvarez MD   rimegepant (Nurtec ODT) 75 mg tablet,disintegrating Take 75 mg by mouth daily as needed (migraine) Max 1 tab per 24 hours 11/16/20   Nicolette Avila MD   butalbital-acetaminophen-caff (FIORICET, ESGIC) -40 mg Take 1 tablet by mouth See administration instructions 8/3/20   Jordana Alvarez MD   PARoxetine (PAXIL) 40 mg tablet Take 1 tablet (40 mg total) by mouth every morning 5/5/20   Jordana Alvarez MD   alendronate (FOSAMAX) 70 mg tablet Take 1 tablet (70 mg total) by mouth once a week 4/20/20   Jordana Alvarez MD   potassium chloride (KLOR-CON M20) 20 mEq CR tablet Take 1 tablet (20 mEq total) by mouth daily 11/8/19   Jordana Alvarez MD   cetirizine (ZyrTEC) 10 mg tablet Take 1 tablet (10 mg total) by mouth as needed 1/20/20   Jordana Alvarez MD   atorvastatin (LIPITOR) 40 mg tablet Take 1 tablet (40 mg total) by mouth  "nightly 1/20/20   ProviderJordana MD   ibuprofen (ADVIL,MOTRIN) 200 mg tablet Take 1 tablet (200 mg total) by mouth as needed    ProviderJordana MD   calcium carbonate-vitamin D3 500 mg-10 mcg (400 unit) tablet Take 1 tablet by mouth daily.      Jordana Alvarez MD   losartan (COZAAR) 100 mg tablet Take by mouth daily.   1/17/13   Provider, Kyle       Review of Systems:  10 Point Review of System was discussed with patient & family, Essential negative except for Pertinent Positive reported in HPI.     Objective     Vital signs:    Temp:  [36.7 °C (98.1 °F)-36.8 °C (98.2 °F)] 36.7 °C (98.1 °F)  Heart Rate:  [69-79] 77  Resp:  [15-21] 21  SpO2:  [90 %-96 %] 90 %  BP: (134-181)/() 135/80  SpO2/FiO2 Ratio Using Approximate FiO2 (%):  [263.9] 263.9  Estimated P/F Ratio Using Approximate FiO2 (%):  [232] 232      Exam:  General appearance: alert, appears older than stated age, cooperative, fatigued, and no distress  Throat: Dry oral mucosa  Lungs: clear to auscultation bilaterally  Heart: S1, S2 normal  Abdomen: Soft, nontender, nondistended, positive bowel sounds  Extremities: extremities normal, warm and well-perfused; no cyanosis, clubbing, or edema  Skin: Skin color, texture, turgor normal. No rashes or lesions  Neurologic: Mental status: Alert, oriented, thought content appropriate  Sensory: normal, normal.  Motor: Motor strength left lower extremity 4/5, left upper extremity 5/5.    EKG: Normal sinus at 76 bpm.  QTc 456.  T wave inversions lead V5.         Labs:  A1c: No results found for: \"HGBA1C\"  CBC with Platelet:    Lab Results   Component Value Date    WBC 11.1 (H) 12/15/2024    HGB 14.8 12/15/2024    HCT 42.0 12/15/2024     12/15/2024    RBC 5.11 12/15/2024    MCV 82.2 12/15/2024    MCH 28.9 12/15/2024    MCHC 35.2 12/15/2024    RDW 13.9 12/15/2024    MPV 6.6 (L) 12/15/2024     Comp:   Lab Results   Component Value Date     12/15/2024    K 3.2 (L) 12/15/2024     " "(H) 12/15/2024    CO2 24 12/15/2024    BUN 5 (L) 12/15/2024    CREATININE 0.51 (L) 12/15/2024    GLUCOSE 112 (H) 12/15/2024    CALCIUM 8.7 12/15/2024    PROT 7.7 12/15/2024    ALBUMIN 4.0 12/15/2024    AST 14 12/15/2024    ALT 13 12/15/2024    ALKPHOS 110 (H) 12/15/2024    BILITOT 0.47 12/15/2024     Magnesium: No results found for: \"MG\"  Protime-INR:   Lab Results   Component Value Date    PT 12.2 12/15/2024    INR 1.1 12/15/2024       Lab Results   Component Value Date    TROPHS 6.7 12/15/2024         Imaging:  XR chest portable 1 view    Result Date: 12/15/2024  EXAM: DX CHEST PORTABLE 1 VW DATE: 12/15/2024 10:29 PM INDICATION:  Suspected stroke; Suspected stroke COMPARISON: 11/16/2024. TECHNIQUE: 1 view FINDINGS: The heart is not enlarged. Clear lungs. No pleural effusion or pneumothorax.      IMPRESSION: No acute disease.     CT angiogram head and neck STROKE ALERT    Result Date: 12/15/2024  EXAM: CT ANGIOGRAM HEAD AND NECK STROKE ALERT DATE: 12/15/2024 10:26 PM INDICATION:  Suspected stroke; Suspected stroke COMPARISON: Head CT today. TECHNIQUE: Postcontrast CTA images of the head and neck. 3D/MIP images were generated and reviewed. Characterization of carotid stenosis was accomplished utilizing NASCET criteria. Dose reduction technique utilized; automatic/anatomic modulation of X-ray tube current (Auto mA). CONTRAST: 80 mL of Isovue-370 was administered intravenously from a multiuse vial. FINDINGS: CTA of the head: Patent bilateral intracranial internal carotid arteries. Patent bilateral anterior cerebral and middle cerebral arteries. The anterior communicating artery is present. Codominant V4 segments. Patent basilar and bilateral posterior cerebral arteries. CTA of the neck: Two vessel arch with common origin of the brachiocephalic and left common carotid artery.1 No flow significant stenosis in the proximal great vessels. No flow significant stenosis in the right common or cervical internal carotid " artery. No flow significant stenosis in the left common or cervical internal carotid artery. Patent cervical vertebral arteries, codominant. The soft tissues of the neck and skull base demonstrate no masses or areas of abnormal enhancement. The visualized lung apices are clear.     IMPRESSION: 1.  No large vessel occlusion, aneurysm, dissection, or critical stenosis in the head or neck.     CT head STROKE ALERT wo IV contrast    Result Date: 12/15/2024  EXAM: CT HEAD STROKE ALERT WO IV CONTRAST DATE: 12/15/2024 10:21 PM INDICATION:  Suspected stroke; Suspected stroke COMPARISON: 5/9/2023 Technique: Noncontrast CT images of the head. Dose reduction technique utilized; automatic/anatomic modulation of X-ray tube current (Auto mA). Findings: The ventricles and other CSF spaces are nondilated. There is no midline shift or herniation. Normal parenchymal attenuation, with preserved grey-white differentiation. No intracranial hemorrhage or extraaxial fluid collection. No calvarial fracture or large scalp hematoma. Normal globes and orbital soft tissues. Visualized paranasal sinuses and mastoid are cells are well-aerated.     IMPRESSION: No acute intracranial hemorrhage or mass effect. No CT changes of acute infarct. ASPECTS Score 10/10. Results Communication: findings concordant with the above were communicated to Jennifer Grullon MD by Aldair Stover MD via telephone 12/15/2024 10:28 PM.        Assessment/Plan      Principal Problem:    Suspected cerebrovascular accident (CVA)  Active Problems:    Hypokalemia     53 y/o female with reported PMHx of hypertension, chronic pain syndrome, chronic right upper quadrant abdominal pain, migraines, and obesity with BMI of 36 is transferred from [place] via [facility] with stroke alert.     # Suspected stroke  # Hypokalemia    -Stroke protocol orders utilized  - patient hemodynamically stable  -CTA head and neck negative for any acute occlusion or hemorrhage  -Dr. Grullon from neurology  consulted by the ER  -Recommendation is for MRI brain and echo with saline contrast  -Recommendation is for aspirin 81 mg daily for stroke prevention.  -No TNKase recommended.  -Permissive hypertension.  -Neurochecks every 4 hours and PT OT consultation  -Formal neuroconsult pending.  -Replete potassium.    DVT Prophylaxis: pharmacologic prophylaxis (with any of the following: enoxaparin (Lovenox) 40mg SQ 2 hours prior to surgery then every day)      Catheters & Lines: Peripheral    Wounds: None    Code Status: Full Code    Expected Length of Stay: Hospitalized for 1-2 midnights    Discussed with Provider: Dr. Davidson in ER      Time Spent:   Total 75 Min, of which more than 50% on care coordination and counseling with patient or family.  Discussed with the ER physician along with any necessary consultants.  Case will be discussed with daytime hospitalist in the morning for assumption of care.      JAMEL FERNANDEZ MD      Please note:  Dragon Voice recognition program was used to aid in documentation of this record.  Sometimes words are not printed exactly as they were spoken.  While efforts were made to carefully edit and correct any inaccuracies, some areas may be present; please take these into context.  Please contact the provider if areas are identified.

## 2024-12-16 NOTE — INTERDISCIPLINARY/THERAPY
[address] [facility] [id number]  [phone]      Inpatient Occupational Therapy Initial Evaluation Note    Date of Service: 12/16/24  Patient Name: Ivonne Giron  Referring Provider: CALVIN FERNANDEZ  Medicare or Medicaid note, cosigner has been added.: N/A  SOC Date: 12/16/24  Completed Visit Number: 1  Certification Date: 01/15/25    Medical Diagnosis:    Left-sided weakness [R53.1]  Suspected cerebrovascular accident (CVA) [R09.89]  Treatment Diagnoses:  Treatment Diagnosis: Decreased ADL status, Decreased endurance, Decreased functional mobility, Decreased sensation    Subjective   Family/Caregiver Present  Family/Caregiver Present: No  Subjective Comments  RN Stated patient is medically cleared for therapy: Yes  Subjective Comments: Pt resting in bed at start and end of session. Alarm on, call light within reach, and all needs met.   Activities limited by patient complaint: Walking/Mobility, Prolonged standing, Transfers, Prolonged sitting, ADLs  Social/Occupational/Recreational  Lived With: Daughter  Receives Help From: Family  Current Assistive or Adaptive Equipment  Equipment: None  Pain Assessment Scale  Pain Scale: 0-10  Pain Score: 0-No pain  Patient's Goal for Therapy: not stated      Past Medical History:   Diagnosis Date    Abdominal pain, chronic, generalized     Accelerated essential hypertension     Chronic pain disorder     Intercostal neuropathic pain     Migraine     Pain     surgical sight pain since gallbladder surgery       Current Facility-Administered Medications:     atorvastatin (LIPITOR) tablet 40 mg, 40 mg, oral, Nightly, Calvin Fernandez MD, 40 mg at 12/16/24 0139    calcium carbonate-vitamin D3 500 mg(1250mg)-200 unit(5mcg) per tablet 1 tablet, 1 tablet, oral, Daily, Calvin Fernandez MD    losartan (COZAAR) tablet 100 mg, 100 mg, oral, Daily, Calvin Fernandez MD    PARoxetine (PAXIL) tablet 40 mg, 40 mg, oral, q AM, Calvin Fernandez MD    potassium chloride  (KLOR-CON) CR tablet 20 mEq, 20 mEq, oral, Daily, Calvin Crawford MD    pregabalin (LYRICA) capsule 50 mg, 50 mg, oral, Nightly, Calvin Crawford MD    sodium chloride flush 2 mL, 2 mL, intravenous, q8h EULALIO, Calvin Crawford MD, 2 mL at 12/16/24 1400    sodium chloride flush 2 mL, 2 mL, intravenous, PRN, Calvin Crawford MD    Maintain IV access, , , Until discontinued **AND** Saline lock IV, , , Once **AND** sodium chloride flush 3 mL, 3 mL, intravenous, PRN, Calvin Crawford MD    bisacodyL (DULCOLAX) suppository 10 mg, 10 mg, rectal, Daily PRN, Calvin Crawford MD    polyethylene glycol (GLYCOLAX) powder 17 g, 17 g, oral, Daily, Calvin Crawford MD    alum-mag hydroxide-simeth (MAALOX ADVANCED) suspension 30 mL, 30 mL, oral, 3x daily PRN, Calvin Crawford MD    enoxaparin (LOVENOX) syringe 40 mg, 40 mg, subcutaneous, Daily at 1400, Calvin Crawford MD, 40 mg at 12/16/24 1330    acetaminophen (TYLENOL) tablet 650 mg, 650 mg, oral, q6h PRN, Calvin Crawford MD    oxyCODONE (ROXICODONE) immediate release tablet 5 mg, 5 mg, oral, q4h PRN, Calvin Crawford MD, 5 mg at 12/16/24 0922    melatonin tablet 3 mg, 3 mg, oral, Nightly PRN, Calvin Crawford MD, 3 mg at 12/16/24 0139    ondansetron (ZOFRAN) tablet 4 mg, 4 mg, oral, q6h PRN **OR** ondansetron (ZOFRAN) injection 4 mg, 4 mg, intravenous, q6h PRN, Calvin Crawford MD    aspirin EC tablet 81 mg, 81 mg, oral, Daily, Calvin Crawford MD, 81 mg at 12/16/24 0921    labetaloL injection 10 mg, 10 mg, intravenous, q4h PRN, Calvin Crawford MD  Allergies   Allergen Reactions    Indomethacin      HIVES    Prednisone      N/V    Sulfa (Sulfonamide Antibiotics)      HIVES          Objective    Precautions  Reinforced Precautions: Yes  Other Precautions: fall risk, L weakness> R weakness, L impaired sensation.  Weight Bearing Precautions: No  Is this a Co-Treatment?: No  Findings:  Cognition: Delayed processing. Increased fatigue noted.     Bed mobility: Supine<>sit at edge of  bed min A x1 at legs.     Transfers: Pt completed x2 sit<>stand xfers at edge of bed this date. Pt needing min A x1 w/ front wheeled walker each attempt. Cues for hand placement. Upon second attempt, pt able to complete stand pivot transfer to bedside commode w/ min A x1 w/ front wheeled walker. Noted to have trouble lifting L foot from floor. Stand<>sit at bedside commode min A x1 w/ front wheeled walker. No loss of balance noted. Cues for hand placement.     Functional mobility: Unable to complete     UE assessment: grossly 3/5 LUE, grossly 4/5 RUE. Reporting decreased L side sensation.     Activity Tolerance: 1L at rest 94%. Following activity on RA, pt at 87%. Able to quickly recover on 1L upon resting.     ADLs: see treatment     Treatment:  Dressing: Pt able to don/doff socks with min A via crossing leg. Increased time and effort needed. Cues required to complete cross leg technique to improve ability to partake in task.     Toileting: Standing at toilet, pt needing contact guard assist for clothing management for balance. Pt able to complete samson-cares contact guard assist seated on bedside commode following urination.     Education Documentation  ADL training, taught by Fernanda Matamoros OTR at 12/16/2024  2:58 PM.  Learner: Patient  Readiness: Acceptance  Method: Explanation  Response: Verbalizes Understanding    Education Comments  No comments found.      Time Calculation  Start Time: 1140  Stop Time: 1208  Time Calculation (min): 28 min  Therapeutic Interventions (Time Spent in Minutes)  ADL Selfcare Home Managment: 20        Assessment/Plan   Assessment:  Level of Function and Prognosis  Prior Function Comments: Pt lives in a house with her daughter w/o SAM. She has a tub shower w/ chair and standard toilet. IND prior with ADLs and mobility.  Current Level of Function: Below repoted PLOF.  Assessment: Pt tolerated therapy session fair this date as she presented below her reported PLOF with ADLs, xfers, and  functional mobility. Pt limited due to L side weakness and impaired sensation. Pt will benefit from continued skilled therapy to progress toward safe and funtional ADLs and ensure safe DC plan.  OT Care Plan (Active)        Occupational Therapy        Grooming       Dates: Start:  12/16/24          LTG - Patient will complete daily grooming tasks       Dates: Start:  12/16/24    Expected End:  01/15/25       Description: Standing at sink w/ SUP      Outcomes       Date/Time User Outcome    12/16/24 1459 Fernanda Matamoros OTR Progressing                Toileting       Dates: Start:  12/16/24          LTG - Patient will complete daily toileting tasks       Dates: Start:  12/16/24    Expected End:  01/15/25       Description: W/ SUP regarding clothing management and samson-cares at toilet.       Outcomes       Date/Time User Outcome    12/16/24 1459 Fernanda Matamoros OTR Progressing                 Plan:  Plan  Plan for next treatment comment: Ax1, Standing tolerance, functional mobility, Strength  Treatment Interventions: ADL retraining, UE strengthening/ROM, Therapeutic exercise, Therapeutic activity, Patient/family training, Equipment evaluation/education, Endurance training, Self-Care Home Management  OT Frequency: 2-3x/wk, up to 5x/wk  Recommendation  Recommendations for Therapy: Continue skilled therapy, Safety issues - physical  Treatment duration will be through Certification Date: 01/15/25

## 2024-12-16 NOTE — PLAN OF CARE
Problem: Knowledge Deficit  Goal: Patient/family/caregiver demonstrates understanding of disease process, treatment plan, medications, and discharge instructions  Description: INTERVENTIONS:   1. Complete learning assessment and assess knowledge base  2. Provide teaching at level of understanding   3. Provide teaching via preferred learning methods  Outcome: Progressing     Problem: Potential for Compromised Skin Integrity  Goal: Skin Integrity is Maintained or Improved  Description: INTERVENTIONS:  1. Assess and monitor skin integrity  2. Collaborate with interdisciplinary team and initiate plans and interventions as needed  3. Alternate a full bath with partial baths for elderly   4. Monitor patient's hygiene practices   5. Collaborate with wound, ostomy, and continence nurse  Outcome: Progressing  Goal: Nutritional status is improving  Description: INTERVENTIONS:  1. Monitor and assess patient for malnutrition (ex- brittle hair, bruises, dry skin, pale skin and conjunctiva, muscle wasting, smooth red tongue, and disorientation)  2. Monitor patient's weight and dietary intake as ordered or per policy  3. Determine patient's food preferences and provide high-protein, high-caloric foods as appropriate  4. Assist patient with eating   5. Allow adequate time for meals   6. Encourage patient to take dietary supplement as ordered   7. Collaborate with dietitian  8. Include patient/family/caregiver in decisions related to nutrition  Outcome: Progressing  Goal: MOBILITY IS MAINTAINED OR IMPROVED  Description: INTERVENTIONS  1. Collaborate with interdisciplinary team and initiate plan and interventions as ordered (PT/OT)  2. Encourage ambulation  3. Up to chair for meals  4. Monitor for signs of deconditioning  Outcome: Progressing     Problem: Urinary Incontinence  Goal: Perineal skin integrity is maintained or improved  Description: INTERVENTIONS:  1. Assess genitourinary system, perineal skin, labs (urinalysis), and  history of incontinence to include past management, aggravating, and alleviating factors  2. Collaborate with interdisciplinary team including wound, ostomy, and continence nurse and initiate plans and interventions as needed  4. Consider urine containment device  5. Apply skin protectant   6. Develop skin care regimen  7. Provide privacy when changing patient's incontinence device to maintain their dignity  Outcome: Progressing     Problem: Pain - Adult  Goal: Verbalizes/displays adequate comfort level or baseline comfort level  Description: INTERVENTIONS:  1. Encourage patient to monitor pain and request interventions  2. Assess pain using the appropriate pain scale  3. Administer analgesics based on type and severity of pain and evaluate response  4. Educate/Implement non-pharmacological measures as appropriate and evaluate response  5. Consider cultural, developmental and social influences on pain and pain management  6. Notify Provider if interventions unsuccessful or patient reports new pain  Outcome: Progressing     Problem: Infection - Adult  Goal: Absence of infection during hospitalization  Description: INTERVENTIONS:  1. Assess and monitor for signs and symptoms of infection  2. Monitor lab/diagnostic results  3. Monitor all insertion sites/wounds/incisions  4. Monitor secretions for changes in amount and color  5. Administer medications as ordered  6. Educate and encourage patient and family to use good hand hygiene technique  7. Identify and educate in appropriate isolation precautions for identified infection/condition  Outcome: Progressing     Problem: Safety Adult  Goal: Patient will remain safe during hospitalization  Description: INTERVENTIONS    1. Assess patient for fall risk and implement interventions if needed  2. Use safe transport techniques  3. Assess patient using the appropriate Bo skin assessment scale  4. Assess patient for risk of aspiration  5. Assess patient for risk of  elopement  6. Assess patient for risk of suicide  Outcome: Progressing     Problem: Daily Care  Goal: Daily care needs are met  Description: INTERVENTIONS:   1. Assess and monitor skin integrity  2. Identify patients at risk for skin breakdown on admission and per policy  3. Assess and monitor ability to perform self care and identify potential discharge needs  4. Assess skin integrity/risk for skin breakdown  5. Assist patient with activities of daily living as needed  6. Encourage independent activity per ability   7. Provide mouth care   8. Include patient/family/caregiver in decisions related to daily care   Outcome: Progressing     Problem: Discharge Barriers  Goal: Patient's discharge needs are met  Description: INTERVENTIONS:  1. Assess patient for self-management skills  2. Encourage participation in management  3. Identify potential discharge barriers on admission and throughout hospital stay  4. Involve patient/family/caregiver in discharge planning process  5. Collaborate with case management/ for discharge needs  Outcome: Progressing     Problem: Neurosensory - Adult  Goal: Achieves stable or improved neurological status  Description: INTERVENTIONS  1. Assess for and report changes in neurological status  2. Initiate measures to prevent increased intracranial pressure  3. Maintain blood pressure and fluid volume within ordered parameters to optimize cerebral perfusion and minimize risk of hemorrhage  4. Monitor temperature, glucose, and sodium. Initiate appropriate interventions as ordered  Outcome: Progressing  Goal: Absence of seizures  Description: INTERVENTIONS  1. Monitor for seizure activity  2. Administer anti-seizure medications as ordered  3. Monitor neurological status  4. Assist patient in avoiding triggers, if identified  Outcome: Progressing  Goal: Remains free of injury related to seizures activity  Description: INTERVENTIONS  1. Maintain airway, patient safety  and  administer oxygen as ordered  2. Monitor patient for seizure activity, document and report duration and description of seizure to provider  3. If seizure occurs, turn patient to side and suction secretions as needed  4. Reorient patient post seizure  5. Seizure pads on all 4 side rails  6. Instruct patient/family to notify RN of any seizure activity  7. Instruct patient/family to call for assistance with activity based on assessment  Outcome: Progressing  Goal: Achieves maximal functionality and self care  Description: INTERVENTIONS  1. Monitor swallowing and airway patency with patient fatigue and changes in neurological status  2. Encourage and assist patient to increase activity and self care with guidance from PT/OT  3. Encourage visually impaired, hearing impaired and aphasic patients to use assistive/communication devices  Outcome: Progressing     Problem: Metabolic/Fluid and Electrolytes - Adult  Goal: Electrolytes maintained within normal limits  Description: INTERVENTIONS:  1. Monitor labs and assess patient for signs and symptoms of electrolyte imbalances  2. Administer electrolyte replacement as ordered  3. Monitor response to electrolyte replacements, including repeat lab results as appropriate  4. Fluid restriction as ordered  5. Instruct patient on fluid and nutrition restrictions as appropriate  Outcome: Progressing  Goal: Maintain Optimal Renal Function and Hemodynamic Stability  Description: INTERVENTIONS:  1. Monitor labs and assess for signs and symptoms of volume excess or deficit  2. Monitor intake, output and patient weight  3. Monitor urine specific gravity, serum osmolarity and serum sodium as indicated or ordered  4. Monitor response to interventions for patient's volume status, including labs, urine output, blood pressure (other measures as available)  5. Encourage oral intake as appropriate  6. Instruct patient on fluid and nutrition restrictions as appropriate  Outcome: Progressing  Goal:  Glucose maintained within prescribed range  Description: INTERVENTIONS:  1. Monitor blood glucose as ordered  2. Assess for signs and symptoms of hyperglycemia and hypoglycemia  3. Administer ordered medications to maintain glucose within target range  4. Assess barriers to adequate nutritional intake and initiate nutrition consult as needed  5. Assess baseline knowledge and provide education as indicated  6. Monitor exercise as may reduce the requirements for insulin  Outcome: Progressing     Problem: Skin/Tissue Integrity - Adult  Goal: Skin integrity remains intact  Description: INTERVENTIONS  1. Assess and document risk factors for pressure ulcer development  2. Assess and document skin integrity  3. Monitor for areas of redness and/or skin breakdown  4. Initiate pressure ulcer prevention measures as indicated  Outcome: Progressing  Goal: Incisions, wounds, or drain sites healing without S/S of infection  Description: INTERVENTIONS  1. Assess and document risk factors for skin breakdown  2. Assess and document skin integrity  3. Assess and document dressing/incision, wound bed, drain sites and surrounding tissue  4. Implement wound care per orders  Outcome: Progressing  Goal: Oral mucous membranes remain intact  Description: INTERVENTIONS  1. Assess oral mucosa and hygiene practices  2. Implement preventative oral hygiene regimen  3. Implement oral medicated treatments as ordered  Outcome: Progressing     Problem: Musculoskeletal - Adult  Goal: Return mobility to safest level of function  Description: INTERVENTIONS:  1. Assess patient stability and activity tolerance for standing, transferring and ambulating w/ or w/o assistive devices  2. Assist with transfers and ambulation using safe practices  3. Ensure adequate protection for wounds/incisions during mobilization  4. Obtain PT/OT and other consults as needed  5. Apply Continuous Passive Motion per order to increase flexion toward goal  6. Instruct  patient/family in ordered activity level  Outcome: Progressing  Goal: Maintain proper alignment of affected body part  Description: INTERVENTIONS:  1. Support and protect limb and body alignment per provider order  2. Instruct and reinforce with patient and family use of appropriate assistive device and precautions (e.g. spinal or hip dislocation precautions)  Outcome: Progressing  Goal: Return ADL status to a safe level of function  Description: INTERVENTIONS:  1. Assess patient's ADL deficits and provide assistive devices as needed  2. Obtain PT/OT consults as needed  3. Assist and instruct patient to increase activity and self care  Outcome: Progressing

## 2024-12-16 NOTE — PLAN OF CARE
Problem: Knowledge Deficit  Goal: Patient/family/caregiver demonstrates understanding of disease process, treatment plan, medications, and discharge instructions  Description: INTERVENTIONS:   1. Complete learning assessment and assess knowledge base  2. Provide teaching at level of understanding   3. Provide teaching via preferred learning methods  Outcome: Progressing     Problem: Potential for Compromised Skin Integrity  Goal: Skin Integrity is Maintained or Improved  Description: INTERVENTIONS:  1. Assess and monitor skin integrity  2. Collaborate with interdisciplinary team and initiate plans and interventions as needed  3. Alternate a full bath with partial baths for elderly   4. Monitor patient's hygiene practices   5. Collaborate with wound, ostomy, and continence nurse  Outcome: Progressing  Goal: Nutritional status is improving  Description: INTERVENTIONS:  1. Monitor and assess patient for malnutrition (ex- brittle hair, bruises, dry skin, pale skin and conjunctiva, muscle wasting, smooth red tongue, and disorientation)  2. Monitor patient's weight and dietary intake as ordered or per policy  3. Determine patient's food preferences and provide high-protein, high-caloric foods as appropriate  4. Assist patient with eating   5. Allow adequate time for meals   6. Encourage patient to take dietary supplement as ordered   7. Collaborate with dietitian  8. Include patient/family/caregiver in decisions related to nutrition  Outcome: Progressing  Goal: MOBILITY IS MAINTAINED OR IMPROVED  Description: INTERVENTIONS  1. Collaborate with interdisciplinary team and initiate plan and interventions as ordered (PT/OT)  2. Encourage ambulation  3. Up to chair for meals  4. Monitor for signs of deconditioning  Outcome: Progressing

## 2024-12-16 NOTE — MEDICATION HISTORY SPECIALIST NOTES
"    [facility] [id number]    CSN: [id number]  : [id number]    Patient unable to verify medications at time of interview.   Med list updated per resources available at this time.    Information sources:  Epic  Complete Dispense Report  Other:  First     New medications added:  Norco 5-325  Methocarbamal 500   Omeprazole DR 40 mg   Sucralfate 1 g    Discontinued medications:  Fosamax - from , not on resources  Atorvastatin from   Nurtec ODT - from , not on resources  Pregabalin 50 mg - , from , not on resources  Hydroxyzine 25 mg - from , not on resources  Losartan 100 mg >6 months, not on current resources.  Paroxetine 40 mg >6 months, not on current resources  Potassium 20 meq >6 months, not on current resourses.      Removed \"one-line\" medications from profile and re-entered for proper inpatient functionality. Medications removed and re-entered:  Qulipta 60 mg    Profile was checked for  medications. Added the following meds back to profile as they had  and subsequently fallen off:  Tramadol 50 mg   Fosamax - removed in error    Medication History Specialist will continue to follow-up for 3 days. See scanned documents for medication resources to date. Informed provider of unable to assess status.     See Francisco Javier Manager for medication reconciliation documents.  "

## 2024-12-16 NOTE — ED PROVIDER NOTES
Room: 21    HPI:  Chief Complaint   Patient presents with    Stroke     PATIENT TRANSFERRED FROM [place] [facility]IA [facility] AS STROKE ALERT, PATIENT STATES THAT SHE WENT TO BED AT MIDNIGHT W/O SYMPTOMS, AWOKE AT 09:00 THIS MORNING COMPLAINING OF CP AND L ARM WEAKNESS AND PAIN. PATIENT STATES NO PREVIOUS CARDIAC HX OR STROKE HX. EMS STATES L SIDED WEAKNESS AT 09:00, L FACIAL DROOP AT 12:00, AND BECAME OBTUNDED AT 18:00. PATIENT RECEIVED 81MG ASA AND 300MG PLAVIX AT SENDING FACILITY, AS WELL AS 20MEQ KCL. SENDING FACILITY [facility] 7.     HPI    Patient presents as transfer from [place] for evaluation of strokelike symptoms.  Reports from outside hospital states that patient had stroke symptoms that began at 9 AM this morning or approximately 13 hours ago.  When I visited with the patient she states she woke up with the symptoms at 9 AM and her last known well was midnight this morning or 23 hours ago when she went to bed.  Patient evaluation at outside hospital was discussed with neurology and she was emergently sent here.  Patient does arrive as a stroke alert and went immediately to CT angiogram here.    Outside evaluation reviewed.  White count 11.8 with a hemoglobin of 14.6.  Lactate was 2.2.  Troponin is negative.  Glucose was 107 with a creatinine 0.6.  Alcohol is negative.  Lipase is 53.  CT of the head did not show acute abnormality.  EKG showed sinus rhythm at 62.    Patient was given Narcan there and it is unclear if she responded to this.  She was noted to be obtunded there but is talking here although somewhat slow.  It is unclear if Narcan helped and there is no sent toxicologic screen.    HISTORY:  Past Medical History:   Diagnosis Date    Abdominal pain, chronic, generalized     Accelerated essential hypertension     Chronic pain disorder     Intercostal neuropathic pain     Migraine     Pain     surgical sight pain since gallbladder surgery       Past Surgical History:   Procedure Laterality Date    ANKLE  SURGERY Right 02/2020    CHOLECYSTECTOMY         Family History   Problem Relation Age of Onset    Arthritis Mother     Hypertension Father     Hypertension Other     Tuberculosis Other     Arthritis Other     Diabetes Other     Heart disease Other     Migraines Other     Migraines Mother's Sister        Social History     Tobacco Use    Smoking status: Never    Smokeless tobacco: Never   Vaping Use    Vaping status: Never Used   Substance Use Topics    Alcohol use: No    Drug use: No       ROS:  As per HPI    PHYSICAL EXAM:  ED Triage Vitals [12/15/24 2241]   Temp Heart Rate Resp BP SpO2   36.7 °C (98.1 °F) 79 20 (!) 149/103 96 %      Mean BP (mmHg) Temp Source Heart Rate Source Patient Position BP Location   117 Oral -- -- --      FiO2 (%)       --         Nursing note and vitals reviewed.  Constitutional: appears well-developed.   HENT: Oral mucosa moist  Head: Normocephalic and atraumatic.   Eyes: Pupils are equal, round, .   Neck: Supple,   Cardiovascular: Regular rate and rhythm with no murmur, rub, or gallop.  Normal pulses.  Pulmonary/Chest: No respiratory distress.  Clear to auscultation bilaterally.  Abdominal: Soft and nontender.    Back: No CVA tenderness.  Musculoskeletal: No edema  Neurological: Alert with mild drowsiness.  Left greater than right sided weakness in arm and leg  Skin: Skin is warm and dry. No rash noted.   Psychiatric: Normal mood and affect.    Labs Reviewed   COMPREHENSIVE METABOLIC PANEL - Abnormal       Result Value    Sodium 144      Potassium 3.2 (*)     Chloride 110 (*)     CO2 24      Anion Gap 10      BUN 5 (*)     Creatinine 0.51 (*)     Glucose 112 (*)     Calcium 8.7      AST 14      ALT (SGPT) 13      Alkaline Phosphatase 110 (*)     Total Protein 7.7      Albumin 4.0      Total Bilirubin 0.47      Corrected Calcium 8.7      eGFR 110      Narrative:     Calculation based on the 2021 Chronic Kidney Disease Epidemiology Collaboration (CKD-EPI) equation refit without  adjustment for race.   CBC - Abnormal    WBC 11.1 (*)     RBC 5.11      Hemoglobin 14.8      Hematocrit 42.0      MCV 82.2      MCH 28.9      MCHC 35.2      RDW 13.9      Platelets 298      MPV 6.6 (*)    PTT (ACTIVATED PARTIAL THROMBOPLASTIN TIME) - Normal    PTT 32.6     PROTIME-INR - Normal    Protime 12.2      INR 1.1     HIGH SENSITIVE TROPONIN I, 2 HOUR         XR chest portable 1 view   Final Result   IMPRESSION:   No acute disease.       CT angiogram head and neck STROKE ALERT   Final Result   IMPRESSION:   1.  No large vessel occlusion, aneurysm, dissection, or critical stenosis in the head or neck.         CT head STROKE ALERT wo IV contrast   Final Result   IMPRESSION:   No acute intracranial hemorrhage or mass effect. No CT changes of acute infarct. ASPECTS Score 10/10.       Results Communication: findings concordant with the above were communicated to Jennifer Grullon MD by Aldair Stover MD via telephone 12/15/2024 10:28 PM.            ED Medication Administration from 12/15/2024 2213 to 12/16/2024 0004         Date/Time Order Dose Route Action Action by     12/15/2024 2225 [facility] sodium chloride flush 2 mL 2 mL intravenous Given Pending sale to [facility]      12/15/2024 2225 [facility] sodium chloride 0.9 % bolus 500 mL 500 mL intravenous $$ New Bag/New Syringe Pending sale to [facility]      12/15/2024 2344 [facility] sodium chloride 0.9 % bolus 500 mL 0 mL intravenous Stopped Pending sale to [facility]      12/15/2024 2235 [facility] iopamidoL (ISOVUE-370) 370 mg iodine /mL (76 %) injection 80 mL 80 mL intravenous Given JEANNIE Muller              PROCEDURES:  Procedures      ED COURSE:  ED Course as of 12/16/24 0004   Sun Dec 15, 2024   2235 Discussed with neurologist.  Will hospitalize for MRI with and without any further evaluation. [BE]   5977 EKG: Sinus rhythm at 75 with baseline artifact but no acute ischemia or ectopy.  No old for comparison here [BE]      ED Course User Index  [BE] Magdalena Davidson MD       Sepsis Quality Bundle      MDM:     Patient presents for  evaluation of strokelike symptoms.  Her last known well was almost 23 hours ago.  She has left-sided weakness but her right side also seems somewhat weak.  She states she has pain with this as well which is somewhat confusing for strokelike symptoms.  She was reportedly obtunded outside hospital given Narcan but although drowsy is awake and talking here.  Labs are unremarkable.  Alcohol was negative.  CT angiogram does not show obvious occlusion and chest x-ray is not showing signs of dissection or other abnormality.  Imaging was reviewed by myself independently as well as the radiology report as part of my evaluation.  Patient was discussed with the stroke neurologist as well as the hospitalist and will hospitalize for further stroke evaluation.  There is no obvious indication for TNK at this time and patient appears to be improving.  EKG does not show ischemia or ectopy and patient was watched on telemetry with no dysrhythmias.      CLINICAL IMPRESSION:  Final diagnoses:   [R53.1] Left-sided weakness   Chronic abdominal pain  Hypertension  Migraines        A voice recognition program was used to aid in documentation of this record.  Sometimes words are not printed exactly as they were spoken.  While efforts were made to carefully edit and correct any inaccuracies, some areas may be present; please take these into context.  Please contact the provider if areas are identified.       Magdalena Davidson MD  12/16/24 0008

## 2024-12-16 NOTE — INTERDISCIPLINARY/THERAPY
RN reports patient is lethargic right now and likely unable to participate in PT evaluation.    Will attempt back as able.

## 2024-12-17 ENCOUNTER — APPOINTMENT (OUTPATIENT)
Dept: MRI IMAGING | Facility: HOSPITAL | Age: 54
DRG: 065 | End: 2024-12-17
Payer: COMMERCIAL

## 2024-12-17 ENCOUNTER — APPOINTMENT (OUTPATIENT)
Dept: CT IMAGING | Facility: HOSPITAL | Age: 54
DRG: 065 | End: 2024-12-17
Payer: COMMERCIAL

## 2024-12-17 LAB
ANION GAP SERPL CALC-SCNC: 8 MMOL/L (ref 3–11)
BUN SERPL-MCNC: 9 MG/DL (ref 7–25)
CALCIUM SERPL-MCNC: 9.1 MG/DL (ref 8.6–10.3)
CHLORIDE SERPL-SCNC: 106 MMOL/L (ref 98–107)
CO2 SERPL-SCNC: 27 MMOL/L (ref 21–32)
CREAT SERPL-MCNC: 0.59 MG/DL (ref 0.6–1.1)
EGFRCR SERPLBLD CKD-EPI 2021: 106 ML/MIN/1.73M*2
GLUCOSE SERPL-MCNC: 99 MG/DL (ref 70–105)
MAGNESIUM SERPL-MCNC: 2.1 MG/DL (ref 1.8–2.4)
POTASSIUM SERPL-SCNC: 3.6 MMOL/L (ref 3.5–5.1)
SODIUM SERPL-SCNC: 141 MMOL/L (ref 135–145)

## 2024-12-17 PROCEDURE — 36415 COLL VENOUS BLD VENIPUNCTURE: CPT | Performed by: STUDENT IN AN ORGANIZED HEALTH CARE EDUCATION/TRAINING PROGRAM

## 2024-12-17 PROCEDURE — 80048 BASIC METABOLIC PNL TOTAL CA: CPT | Performed by: STUDENT IN AN ORGANIZED HEALTH CARE EDUCATION/TRAINING PROGRAM

## 2024-12-17 PROCEDURE — 4A0D7LZ MEASUREMENT OF URINARY VOLUME, VIA NATURAL OR ARTIFICIAL OPENING: ICD-10-PCS | Performed by: STUDENT IN AN ORGANIZED HEALTH CARE EDUCATION/TRAINING PROGRAM

## 2024-12-17 PROCEDURE — 2590000100 HC RX 259: Performed by: INTERNAL MEDICINE

## 2024-12-17 PROCEDURE — 2550000100 HC RX 255: Performed by: STUDENT IN AN ORGANIZED HEALTH CARE EDUCATION/TRAINING PROGRAM

## 2024-12-17 PROCEDURE — 6360000200 HC RX 636 W HCPCS (ALT 250 FOR IP): Performed by: STUDENT IN AN ORGANIZED HEALTH CARE EDUCATION/TRAINING PROGRAM

## 2024-12-17 PROCEDURE — 93010 ELECTROCARDIOGRAM REPORT: CPT | Performed by: STUDENT IN AN ORGANIZED HEALTH CARE EDUCATION/TRAINING PROGRAM

## 2024-12-17 PROCEDURE — 6360000200 HC RX 636 W HCPCS (ALT 250 FOR IP): Performed by: INTERNAL MEDICINE

## 2024-12-17 PROCEDURE — 97161 PT EVAL LOW COMPLEX 20 MIN: CPT | Mod: GP

## 2024-12-17 PROCEDURE — 2500000200 HC RX 250 WO HCPCS: Performed by: STUDENT IN AN ORGANIZED HEALTH CARE EDUCATION/TRAINING PROGRAM

## 2024-12-17 PROCEDURE — 71275 CT ANGIOGRAPHY CHEST: CPT

## 2024-12-17 PROCEDURE — (BLANK) HC ROOM PRIVATE

## 2024-12-17 PROCEDURE — 72141 MRI NECK SPINE W/O DYE: CPT

## 2024-12-17 PROCEDURE — 51798 US URINE CAPACITY MEASURE: CPT

## 2024-12-17 PROCEDURE — 99232 SBSQ HOSP IP/OBS MODERATE 35: CPT | Performed by: STUDENT IN AN ORGANIZED HEALTH CARE EDUCATION/TRAINING PROGRAM

## 2024-12-17 PROCEDURE — 6370000100 HC RX 637 (ALT 250 FOR IP): Performed by: INTERNAL MEDICINE

## 2024-12-17 PROCEDURE — 83735 ASSAY OF MAGNESIUM: CPT | Performed by: STUDENT IN AN ORGANIZED HEALTH CARE EDUCATION/TRAINING PROGRAM

## 2024-12-17 RX ORDER — POTASSIUM CHLORIDE 7.45 MG/ML
10 INJECTION INTRAVENOUS ONCE
Status: COMPLETED | OUTPATIENT
Start: 2024-12-17 | End: 2024-12-17

## 2024-12-17 RX ORDER — IOPAMIDOL 755 MG/ML
80 INJECTION, SOLUTION INTRAVASCULAR ONCE
Status: COMPLETED | OUTPATIENT
Start: 2024-12-17 | End: 2024-12-17

## 2024-12-17 RX ORDER — ESOMEPRAZOLE SODIUM 40 MG/1
40 INJECTION, POWDER, LYOPHILIZED, FOR SOLUTION INTRAVENOUS
Status: DISCONTINUED | OUTPATIENT
Start: 2024-12-17 | End: 2024-12-18 | Stop reason: HOSPADM

## 2024-12-17 RX ORDER — POTASSIUM CHLORIDE 750 MG/1
40 TABLET, FILM COATED, EXTENDED RELEASE ORAL ONCE
Status: DISCONTINUED | OUTPATIENT
Start: 2024-12-17 | End: 2024-12-17

## 2024-12-17 RX ORDER — METOCLOPRAMIDE HYDROCHLORIDE 5 MG/ML
5 INJECTION INTRAMUSCULAR; INTRAVENOUS EVERY 6 HOURS PRN
Status: DISCONTINUED | OUTPATIENT
Start: 2024-12-17 | End: 2024-12-18 | Stop reason: HOSPADM

## 2024-12-17 RX ADMIN — Medication 3 MG: at 19:35

## 2024-12-17 RX ADMIN — ENOXAPARIN SODIUM 40 MG: 100 INJECTION SUBCUTANEOUS at 15:20

## 2024-12-17 RX ADMIN — Medication 1 TABLET: at 09:47

## 2024-12-17 RX ADMIN — Medication 2 ML: at 22:00

## 2024-12-17 RX ADMIN — IOPAMIDOL 80 ML: 755 INJECTION, SOLUTION INTRAVENOUS at 13:15

## 2024-12-17 RX ADMIN — POTASSIUM CHLORIDE 10 MEQ: 7.46 INJECTION, SOLUTION INTRAVENOUS at 15:55

## 2024-12-17 RX ADMIN — ASPIRIN 81 MG: 81 TABLET ORAL at 09:47

## 2024-12-17 RX ADMIN — ACETAMINOPHEN 650 MG: 325 TABLET ORAL at 11:58

## 2024-12-17 RX ADMIN — ONDANSETRON 4 MG: 2 INJECTION INTRAMUSCULAR; INTRAVENOUS at 15:20

## 2024-12-17 RX ADMIN — POLYETHYLENE GLYCOL 3350 17 G: 17 POWDER, FOR SOLUTION ORAL at 09:46

## 2024-12-17 RX ADMIN — ATORVASTATIN CALCIUM 40 MG: 40 TABLET, FILM COATED ORAL at 19:35

## 2024-12-17 RX ADMIN — OXYCODONE HYDROCHLORIDE 5 MG: 5 TABLET ORAL at 19:35

## 2024-12-17 RX ADMIN — ESOMEPRAZOLE SODIUM 40 MG: 40 INJECTION, POWDER, LYOPHILIZED, FOR SOLUTION INTRAVENOUS at 15:55

## 2024-12-17 RX ADMIN — Medication 2 ML: at 14:00

## 2024-12-17 RX ADMIN — Medication 2 ML: at 06:00

## 2024-12-17 NOTE — INTERDISCIPLINARY/THERAPY
[facility] [address]  [phone]      Inpatient Physical Therapy Evaluation Note    Date of Service: 12/17/2024  Patient Name: Ivonne Giron  Referring Provider: JAMEL FERNANDEZ  Medicare or Medicaid note, ashantier has been added.: N/A  Completed Visit Number: 1  SOC Date: 12/17/24  Certification Period: 01/16/25    Medical Diagnosis:   Left-sided weakness [R53.1]  Suspected cerebrovascular accident (CVA) [R09.89]  Treatment Diagnoses:  Treatment Diagnosis: Decreased mobility, Impaired balance, Decreased endurance    Subjective   Family/Caregiver Present  Family/Caregiver Present: No  Subjective Comments  RN Stated patient is medically cleared for therapy: Yes  Subjective Comments: Patient was seated in recliner resting upon therapy arrival, and was agreeable to participate in PT session.  Following therapy, patient was left seated in recliner with alarm on and call light in reach.   Activities Limited by Patient Complaint  Activities limited by patient complaint: Walking/Mobility, Prolonged standing, ADLs, Transfers  Social/Occupational/Recreational  Lived With: Daughter  Receives Help From: Family  Current Assistive or Adaptive Equipment  Equipment: None  Pain Assessment Scale  Pain Scale: Evans-Baker FACES  Evans-Baker FACES Pain Score: Hurts little bit  Pain Location: Chest  Pain Interventions: Repositioned, Ambulation/increased activity  Patient's Goal for Therapy: None stated.  Paulino Fall Risk Score: 60    Past Medical History:   Diagnosis Date    Abdominal pain, chronic, generalized     Accelerated essential hypertension     Chronic pain disorder     Intercostal neuropathic pain     Migraine     Pain     surgical sight pain since gallbladder surgery       Current Facility-Administered Medications:     esomeprazole (NexIUM) injection 40 mg, 40 mg, intravenous, Daily at 1600, Jenny Person MD    metoclopramide (REGLAN) injection 5 mg, 5 mg, intravenous, q6h PRN, Jenny Person,  MD    potassium chloride 10 mEq in 100 mL IVPB, 10 mEq, intravenous, Once, Jenny Person MD    atorvastatin (LIPITOR) tablet 40 mg, 40 mg, oral, Nightly, Calvin Crawford MD, 40 mg at 12/16/24 1930    calcium carbonate-vitamin D3 500 mg(1250mg)-200 unit(5mcg) per tablet 1 tablet, 1 tablet, oral, Daily, Calvin Crawford MD, 1 tablet at 12/17/24 0947    sodium chloride flush 2 mL, 2 mL, intravenous, q8h EULALIO, Calvin Crawford MD, 2 mL at 12/17/24 1400    sodium chloride flush 2 mL, 2 mL, intravenous, PRN, Calvin Crawford MD    Maintain IV access, , , Until discontinued **AND** Saline lock IV, , , Once **AND** sodium chloride flush 3 mL, 3 mL, intravenous, PRN, Calvin Crawford MD    bisacodyL (DULCOLAX) suppository 10 mg, 10 mg, rectal, Daily PRN, Calvin Crawford MD    polyethylene glycol (GLYCOLAX) powder 17 g, 17 g, oral, Daily, Calvin Crawford MD, 17 g at 12/17/24 0946    alum-mag hydroxide-simeth (MAALOX ADVANCED) suspension 30 mL, 30 mL, oral, 3x daily PRN, Calvin Crawford MD    enoxaparin (LOVENOX) syringe 40 mg, 40 mg, subcutaneous, Daily at 1400, Calvin Crawford MD, 40 mg at 12/17/24 1520    acetaminophen (TYLENOL) tablet 650 mg, 650 mg, oral, q6h PRN, Calvin Crawford MD, 650 mg at 12/17/24 1158    oxyCODONE (ROXICODONE) immediate release tablet 5 mg, 5 mg, oral, q4h PRN, Calvin Crawford MD, 5 mg at 12/16/24 1652    melatonin tablet 3 mg, 3 mg, oral, Nightly PRN, Calvin Crawford MD, 3 mg at 12/16/24 0139    ondansetron (ZOFRAN) tablet 4 mg, 4 mg, oral, q6h PRN **OR** ondansetron (ZOFRAN) injection 4 mg, 4 mg, intravenous, q6h PRN, Calvin Crawford MD, 4 mg at 12/17/24 1520    aspirin EC tablet 81 mg, 81 mg, oral, Daily, Calvin Crawford MD, 81 mg at 12/17/24 0947    labetaloL injection 10 mg, 10 mg, intravenous, q4h PRN, Calvin Crawford MD, 10 mg at 12/16/24 1935  Allergies   Allergen Reactions    Indomethacin      HIVES    Prednisone      N/V    Sulfa (Sulfonamide Antibiotics)      HIVES           Objective    Precautions  Weight Bearing Precautions: No  Is this a Co-Treatment?: No  Findings:   Co-treat Reasoning: N/A     Cognition: Lethargic, able to respond to simple command    Strength/ROM/Sensation: Bilateral lower extremities are grossly WFL, not formally measured    Bed Mobility: Not attempted, patient was seated in recliner pre and post therapy    Transfers: Sit to stand transfers were completed with standby assist    Ambulation: Patient ambulated 6 m with front wheel walker contact-guard assist x 1, slow velocity, no loss of balance    Stairs: Not attempted    Balance: Fair    Other Activities: Education and discussion regarding role of physical therapy and plan of care    Activity Tolerance: Fair  Treatment:    Education Documentation  ADL training, taught by Kaila Joiner PT at 12/17/2024  3:38 PM.  Learner: Patient  Readiness: Acceptance  Method: Explanation  Response: Needs Reinforcement    Education Comments  No comments found.      Time Calculation  Start Time: 1006  Stop Time: 1026  Time Calculation (min): 20 min       Assessment/Plan   Assessment:  Prior Function Comments: Patient lives with her daughter in a home with no steps.  Patient reports that she does not use an assistive device, or have any equipment at her baseline.  Reports that she has a tub shower with no grab bars.  Current Level of Function: Patient ambulated 6 m with front wheel walker contact-guard assist x 1  Prognosis: Good for established goals  Barriers to Discharge: Co-morbidities  Assessment: Patient is moving fairly well, she ambulated 6 m with a front wheel walker contact-guard assist x 1.  She does have slow movements, and increased time to complete, but no loss of balance.  Patient demonstrated fair standing balance both with and without assistive device.  Patient is not at her prior level of function, and will continue to benefit from ongoing skilled physical therapy to further progress her activity  tolerance, mobility, and balance allow her to discharge to her previous home environment.  PT Care Plan (Active)        Physical Therapy        Balance       Dates: Start:  12/17/24          LTG - Patient will maintain balance       Dates: Start:  12/17/24    Expected End:  01/16/25       Description: With standby assist greater than 10 minutes while completing ADLs             Mobility       Dates: Start:  12/17/24          LTG - Patient will ambulate community distance       Dates: Start:  12/17/24    Expected End:  01/16/25       Description: Modified independent with least restrictive assistive device               Recommendation  Recommendations for Therapy: Continue skilled therapy, Safety issues - physical   Plan:  Plan  Treatment Interventions: Therapeutic Exercise, Therapeutic Activity, Neuromuscular Re-education, Gait training  PT Frequency: 3-5x/wk, up to 5x/wk  Plan for next treatment comment: Seen 12/17.  1 assist.  Walked 6 m with front wheel walker contact-guard assist x 1.  Progress strength, balance, activity tolerance as able.  Treatment duration will be through Certification Period: 01/16/25

## 2024-12-17 NOTE — NURSING END OF SHIFT
Nursing End of Shift Summary:     Patient: Ivonne Giron  MRN: [id number]  : 1970, Age: 54 y.o.    Location: [address] [place] [address]    Nursing Goals  Clinical Goals for the Shift: maintain safety an comfort    Narrative Summary of Progress Toward Clinical Goals:  Pt Back from mri this am and ruled not a stroke. Pt as taken meds as per mar and tolerated. Pt wakes up when in the room and goes back to sleep quickly when left alone. Pain meds given for left arm/chest pain.    Barriers to Goals/Nursing Concerns:  No    New Patient or Family Concerns/Issues:  No    Shift Summary:   Significant Events & Communications to Providers (Last 12 Hours)       Last 5 Values    No documentation.                 Oxygen Usage (Last 12 Hours)       Last 5 Values       Row Name 24 1245                   Room Air or Baseline Oxygen Trial by Nursing    Is Patient on Room Air OR on the Same Amount of O2 as at Home? Yes                      Mobility (Last 12 Hours)       Last 5 Values       Row Name 24 0741 24 0800 24 1156 24 1200 24 1417       Mobility    Activity -- Turn;Back to bed -- Turn;Ambulate in room;Chair;Back to bed Turn;Sleeping    Level of Assistance -- Minimal assist, patient does 75% or more -- -- --    Length of Time in Chair (min) -- 0 15 5 --    Distance Ambulated (feet) -- 0 Feet -- 5 Feet --    Distance Ambulated (Meters Calculated) -- 0 Meters -- 1.52 Meters --    Patient Position In bed In bed -- -- --    Turning/Repositioning -- Turns self -- Turns self --    Distance Ambulated (Meters Calculated)(Do Not Use) -- 0 Feet -- 1.52 Feet --                  Urethral Catheter       Active Urethral Catheter       None                  Active Lines       Active Central venous catheter / Peripherally inserted central catheter / Implantable Port / Hemodialysis catheter / Midline Catheter       None                  Infusing Medications   Medication Dose Last Rate     PRN Medications    Medication Dose Last Admin    sodium chloride  2 mL      sodium chloride  3 mL      bisacodyL  10 mg      alum-mag hydroxide-simeth  30 mL      acetaminophen  650 mg      oxyCODONE  5 mg 5 mg at 12/16/24 1652    melatonin  3 mg 3 mg at 12/16/24 0139    ondansetron  4 mg      Or    ondansetron  4 mg      labetalol (NORMODYNE,TRANDATE) IV orderable  10 mg

## 2024-12-17 NOTE — PROGRESS NOTES
[address] [place] [address]  Daily Progress Note  Patient name: Ivonne Giron  MRN: [id number]   LOS: 0 days     Subjective   Patient seen and examined this morning. She continues to weakness in left arm. Reports left substernal chest pain with tenderness on exam. Denies any recent trauma.     Objective   Vitals:Temp:  [36.5 °C (97.7 °F)-36.8 °C (98.2 °F)] 36.6 °C (97.9 °F)  Heart Rate:  [69-81] 81  Resp:  [15-21] 17  SpO2:  [90 %-97 %] 91 %  BP: (125-181)/() 133/85  SpO2/FiO2 Ratio Using Approximate FiO2 (%):  [263.9-346.4] 335.7  Estimated P/F Ratio Using Approximate FiO2 (%):  [232-298.9] 290.2  Physical Exam:   General: NAD  HEENT: No pallor, cyanosis or jaundice. EOMI, PERRLA, moist mucus membranes  Neck: Supple nontender without palpable lymphadenopathy JVD bruits or goiter appreciated  Lungs: Clear to auscultation bilaterally  Heart: Regular rate and rhythm with normal S1 and S2  Abdomen: Soft nontender. normoactive bowel sounds. no hepatosplenomegaly  Extremities: decrease strength in left upper extremity  Neurologic: Alert and oriented ×3.  CN II through XII intact.  Nonfocal   Psych: normal mood  Musculoskeletal: no joint tenderness    Results from last 4 days   Lab Units 12/16/24  0230 12/15/24  2220   WBC AUTO 10*3/uL 8.9 11.1*   HEMOGLOBIN g/dL 13.8 14.8   HEMATOCRIT % 39.4 42.0   PLATELETS AUTO 10*3/uL 275 298     Results from last 4 days   Lab Units 12/16/24  0230 12/16/24  0020 12/15/24  2220   SODIUM mmol/L 144  --  144   POTASSIUM MMOL/L 2.9*  --  3.2*   CHLORIDE mmol/L 108*  --  110*   CO2 mmol/L 26  --  24   BUN mg/dL 5*  --  5*   CREATININE mg/dL 0.48*  --  0.51*   CALCIUM mg/dL 8.6  --  8.7   MAGNESIUM MG/DL 2.0 2.0  --    PHOSPHORUS mg/dL 3.2  --   --    ALBUMIN g/dL  --   --  4.0   TOTAL PROTEIN g/dL  --   --  7.7   BILIRUBIN TOTAL mg/dL  --   --  0.47   ALK PHOS U/L  --   --  110*   ALT U/L  --   --  13   AST U/L  --   --  14   GLUCOSE mg/dL  105  --  112*         Assessment/Plan   55 y/o female with reported PMHx of hypertension, chronic pain syndrome, chronic right upper quadrant abdominal pain, migraines, and obesity with BMI of 36 is transferred from [place] via [facility] with stroke alert.     Left arm weakness/numbness  Reports ongoing weakness in left upper extremity  Stroke work-up including CT head and mri brain neg for acute findings. Chronic ischemic changes  CTA head/neck w/o vascular stenosis  Etiology of left upper weakness unclear. Does report some associated numbness.   Check MRI C spine to evaluate for radiculopathy  PTOT    Hypokalemia  K 2.9. repleted. Monitor    DVT prophylaxis; Lovenox  Code status; Full    Electronically signed by: Jenny Person MD  12/16/2024  5:10 PM

## 2024-12-17 NOTE — INTERDISCIPLINARY/THERAPY
Case Management Admission Note    Phone # [phone]    Living Situation: Family members Private residence            ADLs: Independent  Stairs: No    HME/CPAP: None      Oxygen: No      Home Health:No     Current Resources: S, Department of       Diabetes/supplies: Do you have Diabetes?: No  PCP: [facility]  Funding: [place] [facility]/ [facility]  [place]:[facility] [place] [address]    Source of Information: Patient Interview  Support Person:      Transportation: When discharged, who will be providing your transportation?: Family     Admission Diagnosis: suspected CVA    Narrative: Chart review. CM visited with Ivonne at bedside, explained CM role. Ivonne denies current needs. Ivonne may need [facility] van at discharge.    Dispo: Home

## 2024-12-17 NOTE — PROGRESS NOTES
[address] [place] [address]  Daily Progress Note  Patient name: Ivonne Giron  MRN: [id number]   LOS: 1 day     Subjective   Patient with ongoing medical issues. Reports left sided chest pain. EKG/troponin check and came back neg. CTA chest neg for PE. Lungs are clear. Suspect chest pain is musculoskeletal. Does have some tenderness on exam but exam is variable. Patient also reports weakness and numbness in left arm. Stroke w/u neg and MRI c-spine w/ degenerative changes, no spinal stenosis. She feels weak and needs continued PTOT. Later in the afternoon, started throwing up, zofran and reglan given.     Objective   Vitals:Temp:  [36.7 °C (98 °F)-37.2 °C (99 °F)] 36.7 °C (98 °F)  Heart Rate:  [71-92] 92  Resp:  [16-17] 16  SpO2:  [93 %-97 %] 97 %  BP: (110-180)/() 122/80  SpO2/FiO2 Ratio Using Approximate FiO2 (%):  [332.1-346.4] 346.4  Estimated P/F Ratio Using Approximate FiO2 (%):  [287.3-298.9] 298.9  Physical Exam:   General: NAD  Lungs: Clear to auscultation bilaterally  Heart: Regular rate and rhythm with normal S1 and S2  Abdomen: Soft nontender. normoactive bowel sounds.   Extremities: decrease strength in left upper extremity  Neurologic: Alert and oriented ×3.  CN II through XII intact.  Nonfocal   Psych: normal mood  Musculoskeletal: no joint tenderness    Results from last 4 days   Lab Units 12/16/24  0230 12/15/24  2220   WBC AUTO 10*3/uL 8.9 11.1*   HEMOGLOBIN g/dL 13.8 14.8   HEMATOCRIT % 39.4 42.0   PLATELETS AUTO 10*3/uL 275 298     Results from last 4 days   Lab Units 12/17/24  1226 12/16/24  1733 12/16/24  0230 12/16/24  0020 12/15/24  2220   SODIUM mmol/L 141  --  144  --  144   POTASSIUM MMOL/L 3.6 3.0* 2.9*  --  3.2*   CHLORIDE mmol/L 106  --  108*  --  110*   CO2 mmol/L 27  --  26  --  24   BUN mg/dL 9  --  5*  --  5*   CREATININE mg/dL 0.59*  --  0.48*  --  0.51*   CALCIUM mg/dL 9.1  --  8.6  --  8.7   MAGNESIUM MG/DL 2.1 2.0 2.0   < >  --     PHOSPHORUS mg/dL  --   --  3.2  --   --    ALBUMIN g/dL  --   --   --   --  4.0   TOTAL PROTEIN g/dL  --   --   --   --  7.7   BILIRUBIN TOTAL mg/dL  --   --   --   --  0.47   ALK PHOS U/L  --   --   --   --  110*   ALT U/L  --   --   --   --  13   AST U/L  --   --   --   --  14   GLUCOSE mg/dL 99  --  105  --  112*    < > = values in this interval not displayed.         Assessment/Plan   53 y/o female with reported PMHx of hypertension, chronic pain syndrome, chronic right upper quadrant abdominal pain, migraines, and obesity with BMI of 36 is transferred from [place] via [facility] with stroke alert.     Left arm weakness/numbness  Reports ongoing weakness in left upper extremity  Stroke work-up including CT head and mri brain neg for acute findings. Chronic ischemic changes  CTA head/neck w/o vascular stenosis  MRI c-spine w/ degenerative changes, no spinal stenosis  Etiology of left upper weakness unclear. Does report some associated numbness. Will monitor   PTOT    Left sided chest pain  Reports ongoing substernal chest pain. EKG and troponin neg for ischemia. CTA chest neg for PE. Lungs are clear  Possibly musculoskeletal as she did have tenderness on palpation yesterday which has now resolved  Pain control    Hypokalemia  K 2.9, corrected today. Will monitor    Deconditioning  Needs front wheel walker to help with mobility. Continue PTOT    Nausea/vomiting  No abdominal pain/tenderness. Antiemetics as needed. Started nexium    DVT prophylaxis; Lovenox  Code status; Full    Electronically signed by: Jenny Person MD  12/17/2024  4:33 PM

## 2024-12-18 VITALS
RESPIRATION RATE: 16 BRPM | SYSTOLIC BLOOD PRESSURE: 140 MMHG | OXYGEN SATURATION: 91 % | WEIGHT: 220.46 LBS | HEIGHT: 66 IN | HEART RATE: 91 BPM | TEMPERATURE: 98.2 F | BODY MASS INDEX: 35.43 KG/M2 | DIASTOLIC BLOOD PRESSURE: 83 MMHG

## 2024-12-18 LAB
ANION GAP SERPL CALC-SCNC: 7 MMOL/L (ref 3–11)
BASOPHILS # BLD AUTO: 0.1 10*3/UL
BASOPHILS NFR BLD AUTO: 1.1 % (ref 0–2)
BUN SERPL-MCNC: 11 MG/DL (ref 7–25)
CALCIUM SERPL-MCNC: 8.9 MG/DL (ref 8.6–10.3)
CHLORIDE SERPL-SCNC: 103 MMOL/L (ref 98–107)
CO2 SERPL-SCNC: 30 MMOL/L (ref 21–32)
CREAT SERPL-MCNC: 0.56 MG/DL (ref 0.6–1.1)
EGFRCR SERPLBLD CKD-EPI 2021: 108 ML/MIN/1.73M*2
EOSINOPHIL # BLD AUTO: 0.4 10*3/UL
EOSINOPHIL NFR BLD AUTO: 3.8 % (ref 0–3)
ERYTHROCYTE [DISTWIDTH] IN BLOOD BY AUTOMATED COUNT: 13.8 % (ref 11.5–14)
GLUCOSE SERPL-MCNC: 96 MG/DL (ref 70–105)
HCT VFR BLD AUTO: 39.7 % (ref 34–45)
HGB BLD-MCNC: 13.6 G/DL (ref 11.5–15.5)
LYMPHOCYTES # BLD AUTO: 2.5 10*3/UL
LYMPHOCYTES NFR BLD AUTO: 27.1 % (ref 11–47)
MAGNESIUM SERPL-MCNC: 2.1 MG/DL (ref 1.8–2.4)
MCH RBC QN AUTO: 29.1 PG (ref 28–33)
MCHC RBC AUTO-ENTMCNC: 34.3 G/DL (ref 32–36)
MCV RBC AUTO: 84.6 FL (ref 81–97)
MONOCYTES # BLD AUTO: 0.4 10*3/UL
MONOCYTES NFR BLD AUTO: 4.5 % (ref 3–11)
NEUTROPHILS # BLD AUTO: 5.9 10*3/UL
NEUTROPHILS NFR BLD AUTO: 63.5 % (ref 41–81)
PLATELET # BLD AUTO: 265 10*3/UL (ref 140–350)
PMV BLD AUTO: 6.5 FL (ref 6.9–10.8)
POTASSIUM SERPL-SCNC: 3.6 MMOL/L (ref 3.5–5.1)
RBC # BLD AUTO: 4.69 10*6/UL (ref 3.7–5.3)
SODIUM SERPL-SCNC: 140 MMOL/L (ref 135–145)
WBC # BLD AUTO: 9.3 10*3/UL (ref 4.5–10.5)

## 2024-12-18 PROCEDURE — 6370000100 HC RX 637 (ALT 250 FOR IP): Performed by: STUDENT IN AN ORGANIZED HEALTH CARE EDUCATION/TRAINING PROGRAM

## 2024-12-18 PROCEDURE — 83735 ASSAY OF MAGNESIUM: CPT | Performed by: STUDENT IN AN ORGANIZED HEALTH CARE EDUCATION/TRAINING PROGRAM

## 2024-12-18 PROCEDURE — 97530 THERAPEUTIC ACTIVITIES: CPT | Mod: GP | Performed by: PHYSICAL THERAPIST

## 2024-12-18 PROCEDURE — 36415 COLL VENOUS BLD VENIPUNCTURE: CPT | Performed by: STUDENT IN AN ORGANIZED HEALTH CARE EDUCATION/TRAINING PROGRAM

## 2024-12-18 PROCEDURE — 85025 COMPLETE CBC W/AUTO DIFF WBC: CPT | Performed by: STUDENT IN AN ORGANIZED HEALTH CARE EDUCATION/TRAINING PROGRAM

## 2024-12-18 PROCEDURE — 6370000100 HC RX 637 (ALT 250 FOR IP): Performed by: INTERNAL MEDICINE

## 2024-12-18 PROCEDURE — 2590000100 HC RX 259: Performed by: INTERNAL MEDICINE

## 2024-12-18 PROCEDURE — 99239 HOSP IP/OBS DSCHRG MGMT >30: CPT | Performed by: STUDENT IN AN ORGANIZED HEALTH CARE EDUCATION/TRAINING PROGRAM

## 2024-12-18 PROCEDURE — 80048 BASIC METABOLIC PNL TOTAL CA: CPT | Performed by: STUDENT IN AN ORGANIZED HEALTH CARE EDUCATION/TRAINING PROGRAM

## 2024-12-18 RX ORDER — SPIRONOLACTONE 25 MG/1
25 TABLET ORAL DAILY
Qty: 30 TABLET | Refills: 0 | Status: SHIPPED | OUTPATIENT
Start: 2024-12-18 | End: 2025-01-17

## 2024-12-18 RX ORDER — SUCRALFATE 1 G/1
1 TABLET ORAL
Status: DISCONTINUED | OUTPATIENT
Start: 2024-12-18 | End: 2024-12-18 | Stop reason: HOSPADM

## 2024-12-18 RX ORDER — OMEPRAZOLE 40 MG/1
40 CAPSULE, DELAYED RELEASE ORAL DAILY
Qty: 30 CAPSULE | Refills: 0 | Status: SHIPPED | OUTPATIENT
Start: 2024-12-18 | End: 2025-01-17

## 2024-12-18 RX ORDER — BUTALBITAL, ACETAMINOPHEN AND CAFFEINE 50; 325; 40 MG/1; MG/1; MG/1
1 TABLET ORAL EVERY 6 HOURS PRN
Status: DISCONTINUED | OUTPATIENT
Start: 2024-12-18 | End: 2024-12-18 | Stop reason: HOSPADM

## 2024-12-18 RX ORDER — METHOCARBAMOL 500 MG/1
500 TABLET, FILM COATED ORAL EVERY 8 HOURS PRN
Status: DISCONTINUED | OUTPATIENT
Start: 2024-12-18 | End: 2024-12-18 | Stop reason: HOSPADM

## 2024-12-18 RX ORDER — ASPIRIN 81 MG/1
81 TABLET ORAL DAILY
Qty: 30 TABLET | Refills: 0 | Status: SHIPPED | OUTPATIENT
Start: 2024-12-19 | End: 2025-01-18

## 2024-12-18 RX ORDER — ONDANSETRON 4 MG/1
4 TABLET, FILM COATED ORAL EVERY 8 HOURS PRN
Qty: 10 TABLET | Refills: 0 | Status: SHIPPED | OUTPATIENT
Start: 2024-12-18 | End: 2024-12-21

## 2024-12-18 RX ORDER — LOSARTAN POTASSIUM 25 MG/1
25 TABLET ORAL DAILY
Status: DISCONTINUED | OUTPATIENT
Start: 2024-12-18 | End: 2024-12-18

## 2024-12-18 RX ORDER — LOSARTAN POTASSIUM 25 MG/1
25 TABLET ORAL DAILY
Qty: 30 TABLET | Refills: 0 | Status: SHIPPED | OUTPATIENT
Start: 2024-12-18 | End: 2024-12-18 | Stop reason: HOSPADM

## 2024-12-18 RX ORDER — SPIRONOLACTONE 25 MG/1
25 TABLET ORAL DAILY
Status: DISCONTINUED | OUTPATIENT
Start: 2024-12-18 | End: 2024-12-18 | Stop reason: HOSPADM

## 2024-12-18 RX ADMIN — SUCRALFATE 1 G: 1 TABLET ORAL at 09:10

## 2024-12-18 RX ADMIN — Medication 2 ML: at 06:00

## 2024-12-18 RX ADMIN — SPIRONOLACTONE 25 MG: 25 TABLET ORAL at 12:53

## 2024-12-18 RX ADMIN — ASPIRIN 81 MG: 81 TABLET ORAL at 09:10

## 2024-12-18 RX ADMIN — POLYETHYLENE GLYCOL 3350 17 G: 17 POWDER, FOR SOLUTION ORAL at 09:10

## 2024-12-18 RX ADMIN — Medication 1 TABLET: at 09:10

## 2024-12-18 NOTE — MEDICATION HISTORY SPECIALIST NOTES
"    [facility] [id number]    CSN : [id number]  : [id number]    Information sources:  Epic  Complete Dispense Report  Other: Dr First    Patient home medications updated per resources. At this time I am unable to verify information provided by resources and in Epic, along with last doses and compliance of home medications. Medication history specialist will follow up with patient or family  tomorrow morning to continue to attempt to obtain this information. Epic is updated to the best of my ability at this time for this patient, however is subject to change after verification with patient and/or family.    New medications added:  Norco 5-325  Metocarbam 500   Omeprazole DR 40 mg   Sucralfate 1 g    Discontinued medications:  Fosamax - from , not on resources  Atorvastatin from   Nurtec ODT - from , not on resources  Pregabalin 50 mg - , from , not on resources  Hydroxyzine 25 mg - from , not on resources  Losartan 100 mg >6 months, not on current resources.  Paroxetine 40 mg >6 months, not on current resources  Potassium 20 meq >6 months, not on current resourse      Removed \"one-line\" medications from profile and re-entered for proper inpatient functionality. Medications removed and re-entered:   Qulipta 60 mg    Profile was checked for  medications. Added the following meds back to profile as they had  and subsequently fallen off:  Tramadol 50 mg   Fosamax - removed in error    See  for medication resources.      "

## 2024-12-18 NOTE — DISCHARGE INSTRUCTIONS
-Blood pressure noted to be high during hospitalization. Recommend starting spironolactone 25 mg. Monitor BP readings at home and follow with primary care  -Your potassium level is always low. Spironolactone helps with increasing potassium. Recommend checking BMP in one week. If potasisum continues to be low, could go up on spironolactone to 50 mg or your primary care could add potassium supplement  -Recommend starting aspirin 81 mg for stroke prevention  -follow-up with primary care for further management

## 2024-12-18 NOTE — NURSING END OF SHIFT
Nursing End of Shift Summary:     Patient: Ivonne Giron  MRN: [id number]  : 1970, Age: 54 y.o.    Location: [address] [place] [address]    Nursing Goals  Clinical Goals for the Shift: Safety and comfort. Pain management. No fall    Narrative Summary of Progress Toward Clinical Goals:  Patient in bed in no distress. She remains alert and oriented. Neuro assessment reveal mild weakness in the left lower extremity and equal strength in all other limbs. No other neurological deficit noted. Headache reported mid-day; resolved with Tylenol administration. Episodes (2) of emesis noted; unrelieved by Ondansetron. MD aware, new order received for Compazine. Prior to administration of med, patient reported relief of her symptoms. CT angio and MRI completed. No acute findings reported. No complaints or concerns voiced at this time. Safety and comfort measures in place. Will continue to monitor.     Barriers to Goals/Nursing Concerns:  No    New Patient or Family Concerns/Issues:  No    Shift Summary:   Significant Events & Communications to Providers (Last 12 Hours)       Last 5 Values    No documentation.                 Oxygen Usage (Last 12 Hours)       Last 5 Values       Row Name 24 1400                   Room Air or Baseline Oxygen Trial by Nursing    Is Patient on Room Air OR on the Same Amount of O2 as at Home? Yes        Are You Performing the QShift O2 Trial? Yes        O2 Setting at Start of Trial Room Air        SpO2 During Trial 87 %        O2 Flow Rate L/min After Trial 1 lpm        SpO2 After Trial 95 %                      Mobility (Last 12 Hours)       Last 5 Values       Row Name 24 0750 24 0800 24 0930 24 1139 24 1200       Mobility    Activity -- Turn Bathroom privileges;Chair -- Chair    Level of Assistance -- Independent Standby assist, set-up cues, supervision of patient - no hands on -- --    Length of Time in Chair (min) -- 0 -- -- 150    Distance Ambulated (feet) -- 0  Feet 15 Feet -- 0 Feet    Distance Ambulated (Meters Calculated) -- 0 Meters 4.57 Meters -- 0 Meters    Patient Position In bed In bed -- In bed --    Turning/Repositioning -- Turns self Pillow support -- Pillow support;Shifting weight in chair    Distance Ambulated (Meters Calculated)(Do Not Use) -- 0 Feet 4.57 Feet -- 0 Feet      Row Name 12/17/24 1541 12/17/24 8066                Mobility    Activity -- Turn       Length of Time in Chair (min) -- 0       Distance Ambulated (feet) -- 0 Feet       Distance Ambulated (Meters Calculated) -- 0 Meters       Patient Position In bed --       Turning/Repositioning -- Turns self       Distance Ambulated (Meters Calculated)(Do Not Use) -- 0 Feet                     Urethral Catheter       Active Urethral Catheter       None                  Active Lines       Active Central venous catheter / Peripherally inserted central catheter / Implantable Port / Hemodialysis catheter / Midline Catheter       None                  Infusing Medications   Medication Dose Last Rate     PRN Medications   Medication Dose Last Admin    metoclopramide (REGLAN) IV orderable  5 mg      sodium chloride  2 mL      sodium chloride  3 mL      bisacodyL  10 mg      alum-mag hydroxide-simeth  30 mL      acetaminophen  650 mg 650 mg at 12/17/24 1158    oxyCODONE  5 mg 5 mg at 12/16/24 1652    melatonin  3 mg 3 mg at 12/16/24 0139    ondansetron  4 mg      Or    ondansetron  4 mg 4 mg at 12/17/24 1520    labetalol (NORMODYNE,TRANDATE) IV orderable  10 mg 10 mg at 12/16/24 7069

## 2024-12-18 NOTE — INTERDISCIPLINARY/THERAPY
Attempted OT treatment. Pt recently finished working with PT and requesting to rest. OT to follow-up as able and appropriate.

## 2024-12-18 NOTE — INTERDISCIPLINARY/THERAPY
[address] [facility] [address]  [phone]      Inpatient Physical Therapy Daily Treatment Note    Date of Service: 12/18/2024  Patient Name: Ivonne Giron  Referring Provider: JAMEL FERNANDEZ  Completed Visit Number: 2  SOC Date: 12/17/24  Certification Period: 01/16/25    Medical Diagnosis:   Left-sided weakness [R53.1]  Suspected cerebrovascular accident (CVA) [R09.89]  Treatment Diagnoses:  Treatment Diagnosis: Decreased mobility, Impaired balance, Decreased endurance    Subjective   Family/Caregiver Present  Family/Caregiver Present: No  Subjective Comments  RN Stated patient is medically cleared for therapy: Yes  Subjective Comments: Pt agreed to PT. She began session supine in bed and ended session seated on toilet with call light in reach.     Pain Assessment Scale  Pain Scale: 0-10  Pain Score: 0-No pain  Paulino Fall Risk Score: 60         Objective       Is this a Co-Treatment?: No  Treatments:  Co-treat Reasoning: N/A    Cognition: Some slowed response to questions but appropriate in conversation.     Bed Mobility: Patient transferred supine to sit independently.     Transfers: Patient transferred sit to and from stand with stand by assist.    Ambulation: Patient ambulated 65 meters with no assitive device and stand by assist. Slow gait but good stability.     Balance: good stability with ambulation    Other Activities: Patient ambulated into the bathroom and used bathroom with supervision.     Activity Tolerance: good    Time Calculation  Start Time: 1118  Stop Time: 1127  Time Calculation (min): 9 min  Therapeutic Interventions (Time Spent in Minutes)  Therapeutic Activity: 9       Assessment/Plan   Assessment:    Level of Function and Prognosis  Assessment: Pt did demonstrate some limpness in her LUE when sitting up to edge of bed. When asked, she reported no weakness. She was able to stand and ambulate 65 meters with SBA and no assistive device. She demonstrated slow movements,  slowed gait but good gait stability. Pt reported that she is at her baseline mobility level and has no concerns about returning home. Pt may benefit from follow up with outpatient PT if slowed movements, weakness continues. She is safe to return home with her daughter.  Recommendation  Recommendations for Therapy: Outpatient Therapy  Equipment Recommended: None  Plan:    Plan  Plan for next treatment comment: Discharge PT.  Treatment duration will be through Certification Period: 01/16/25

## 2024-12-19 ENCOUNTER — PATIENT OUTREACH (OUTPATIENT)
Dept: FAMILY MEDICINE | Facility: HOSPITAL | Age: 54
End: 2024-12-19
Payer: COMMERCIAL

## 2024-12-19 NOTE — LETTER
[facility] CENTRAL NURSE TRIAGE  [address]  [facility] [address]  [phone]      Ivonne Lynnette Giron  [address]  [place] [address]  12/20/24    Dear Fidelina Chacon,  [facility] cares about the health and well-being of all our patients. As part of our commitment to our patients, a Registered Nurse (RN) attempts to contact all our patients who have been recently discharged from the hospitals in our system. The reason for the call is to help ensure you understand your discharge instructions and that you have a plan for follow-up care.    You are receiving this letter because you or your dependent were recently discharged from a [facility] or care facility. A Registered Nurse (RN) has attempted to contact you but has not been able to reach you.    [facility] believes that regular preventative care along with continued care and follow-up after hospitalization is essential to your overall health and well-being. We are here for you and ask that you please contact your primary care provider and/or specialty provider to schedule your hospital follow-up appointment/s as stated in your discharge instructions. You have an appt. Scheduled with [facility] on Dec 30 at 10am.      If you don’t yet have a primary care provider, please contact one of our primary care locations or any other local primary care clinic to make an appointment to establish care.    For your convenience, a list of [facility] Primary Care locations is included on the next page.    We look forward to partnering with you during your recovery.      Sincerely,  [facility], [address] [facility] [place] [address]  [phone]    [facility],   [address] [place] [address]   [phone]    [facility], [address] [address]  [phone]    [facility], [address] [place] [place] [address]  [phone]    [facility], [address] [facility] [place] [address]  [phone]    [facility], [address] [place] [address]  [phone]    [facility], [address] [place] [address]  [phone]    [facility], [address] [place] [address]  [phone]    [facility], [address] [place] [address]  [phone]    [facility], [address] [place] [address]  [phone]    [facility], [address] [place] [address], [place] [address]  [phone]

## 2024-12-20 NOTE — PROGRESS NOTES
Attempts to contact Ivonne Giron following recent hospitalization at [facility]. The patient was discharged from the hospital on 12/18/2024 .The patient's main diagnosis during the hospitalization was Suspected cerebrovascular accident (CVA).  TCM call not complete after required amount of attempts to contact patient. Letter sent to address on file or [employer] account. Patient has a follow-up appointment on 12/30 .      Transitional Care Management Follow Up (most recent)       Transitional Care Management - 12/20/24 1607          OTHER    Date of post hospital outreach 12/20/24     Contact Status Final attempt, unable to contact, letter sent     Speaking with the Patient or Patient's Caregiver? --     Is the patient on the Diabetes registry? --     Were patient's home medications changed or did they have any new medications added during the hospitalization? --     Did someone go over the discharge summary with the patient or the patient's caregiver and discuss the medications listed on it? --     Does the patient or patient's caregiver have any questions about the medication changes? --     Patient verbalized understanding of when to contact health care provider or when to get help right away? --     Discharge instructions reviewed with patient or patient's caregiver and all questions answered? --     Is there a Home Health/DME Plan being enacted? Please note name of HH agency, DME vendor, contacted/received --     Does patient have psychosocial issues that might impact their health status? --     Does patient have financial barriers to care? --                      Anabella Ramos RN  December 20, 2024 4:13 PM

## 2024-12-22 NOTE — DISCHARGE SUMMARY
[address] [place] [address]  Discharge Summary    Patient name: Ivonne Giron  MRN: [id number]    Admission Date: 12/15/2024       Discharge Date: 12/18/2024    Admitting Provider: Jenny Person MD  Discharge Provider: No att. providers found  Primary Care Physician at Discharge: [facility] [phone]     Discharge Disposition  01 - Home or Self-Care  Code Status at Discharge: Prior    Outpatient Follow-Up  Future Appointments   Date Time Provider Department Center   5/15/2025  2:00 PM Nicolette Avila MD [facility]        Discharge Diagnosis  Principal Problem:    Suspected cerebrovascular accident (CVA)  Active Problems:    Hypokalemia    Left-sided weakness         Discharge medication list        START taking these medications        Instructions   aspirin 81 mg EC tablet  Start taking on: December 19, 2024   Take 1 tablet (81 mg total) by mouth daily     ondansetron 4 mg tablet  Commonly known as: ZOFRAN   Take 1 tablet (4 mg total) by mouth every 8 (eight) hours as needed for nausea for up to 3 days     spironolactone 25 mg tablet  Commonly known as: ALDACTONE   Take 1 tablet (25 mg total) by mouth daily            CHANGE how you take these medications        Instructions   omeprazole 40 mg capsule  Commonly known as: PriLOSEC  What changed: when to take this   Take 1 capsule (40 mg total) by mouth daily 30 minutes before breakfast            CONTINUE taking these medications        Instructions   alendronate 70 mg tablet  Commonly known as: FOSAMAX      atorvastatin 40 mg tablet  Commonly known as: LIPITOR      butalbital-acetaminophen-caff -40 mg  Commonly known as: FIORICET, ESGIC      calcium carbonate-vitamin D3 500 mg-10 mcg (400 unit) tablet      cetirizine 10 mg tablet  Commonly known as: ZyrTEC      HYDROcodone-acetaminophen 5-325 mg per tablet  Commonly known as: NORCO      methocarbamoL 500 mg tablet  Commonly known as: ROBAXIN      ondansetron ODT 4 mg  disintegrating tablet  Commonly known as: ZOFRAN-ODT   Take 1 tablet (4 mg total) by mouth every 8 (eight) hours as needed for nausea or vomiting     Qulipta 60 mg tablet  Generic drug: atogepant      sucralfate 1 gram tablet  Commonly known as: CARAFATE      traMADol 50 mg tablet  Commonly known as: ULTRAM 50             STOP taking these medications      ibuprofen 200 mg tablet  Commonly known as: ADVIL,MOTRIN               Where to Get Your Medications        These medications were sent to [facility], [place] - [address] [facility]  [address] [facility] [address]      Phone: [phone]   aspirin 81 mg EC tablet  omeprazole 40 mg capsule  ondansetron 4 mg tablet  spironolactone 25 mg tablet         Hospital Course  Ivonne Giron is a 53 y/o female with history of hypertension, chronic pain syndrome, chronic right upper quadrant abdominal pain, migraines, and obesity with BMI of 36 is transferred from [place] via [facility] with stroke alert. She presented with left arm weakness, left-sided facial droop and chest pain. Stroke work-up including CT head and mri brain neg for acute findings. Chronic ischemic changes. CTA head/neck w/o vascular stenosis. EKG and troponin negative for ischemia. Left arm weakness improved over the hospital course. She was able to do well with PTOT but does need a front wheel walker. Plan to discharge home and follow-up with PCP as an outpatient. Details as below.      Left arm weakness/numbness  Reports ongoing weakness in left upper extremity  Stroke work-up including CT head and mri brain neg for acute findings. Chronic ischemic changes  CTA head/neck w/o vascular stenosis  MRI c-spine w/ degenerative changes, no spinal stenosis  Etiology of left upper weakness unclear however did improve over the hospital course     Left sided chest pain, likely musculoskeletal  EKG and troponin neg for ischemia. CTA chest neg for PE. Lungs are clear  Possibly musculoskeletal as she  "did have tenderness on palpation   Pain control     Hypokalemia  Potassium repleted     Deconditioning  Needs front wheel walker to help with mobility.      Nausea/vomiting  No abdominal pain/tenderness. Antiemetics as needed.       Physical Exam at Discharge   Discharge Condition: stable  /83 (BP Location: Left arm, Patient Position: Up in chair, Cuff Size: Regular Adult)   Pulse 91   Temp 36.8 °C (98.2 °F) (Temporal)   Resp 16   Ht 1.676 m (5' 6\")   Wt 100 kg (220 lb 7.4 oz)   SpO2 91%   BMI 35.58 kg/m²   Weight: 100 kg (220 lb 7.4 oz)  HEENT:  PERRLA. Extra ocular movements are intact. Oral pharynx clear without erythema of exudate.   Neck:  Supple. Nontender.  No palpable lymphadenopathy, JVD, or goiter.  Lungs: Clear to auscultation bilaterally without adventitious sounds appreciated  Heart: Regular rate and rhythm with normal S1, S2  Abdomen: Soft.  Nontender.  Normal active bowel sounds.  No hepatosplenomegaly or other masses appreciated.  Extremities: No clubbing, cyanosis, or edema.  Skin: Normal skin turgor  Neurologic: Alert oriented ×3.  CN II through XII intact.  5 out of 5 motor strength throughout upper and lower extremities.      Time spent coordinating discharge: 32 mins    Electronically signed by: Jenny Person MD  12/21/2024  6:39 PM     "

## 2025-01-04 DIAGNOSIS — G43.019 INTRACTABLE MIGRAINE WITHOUT AURA AND WITHOUT STATUS MIGRAINOSUS: Primary | ICD-10-CM

## 2025-01-06 RX ORDER — ATOGEPANT 60 MG/1
1 TABLET ORAL DAILY
Qty: 30 TABLET | Refills: 3 | Status: SHIPPED | OUTPATIENT
Start: 2025-01-06

## 2025-05-15 ENCOUNTER — OFFICE VISIT (OUTPATIENT)
Dept: NEUROLOGY | Facility: CLINIC | Age: 55
End: 2025-05-15
Payer: COMMERCIAL

## 2025-05-15 VITALS
SYSTOLIC BLOOD PRESSURE: 140 MMHG | BODY MASS INDEX: 35.52 KG/M2 | HEIGHT: 66 IN | HEART RATE: 72 BPM | WEIGHT: 221 LBS | DIASTOLIC BLOOD PRESSURE: 100 MMHG | RESPIRATION RATE: 16 BRPM

## 2025-05-15 DIAGNOSIS — G43.019 INTRACTABLE MIGRAINE WITHOUT AURA AND WITHOUT STATUS MIGRAINOSUS: Primary | ICD-10-CM

## 2025-05-15 DIAGNOSIS — R90.89 ABNORMAL FINDING ON MRI OF BRAIN: ICD-10-CM

## 2025-05-15 PROCEDURE — G2211 COMPLEX E/M VISIT ADD ON: HCPCS | Mod: NCNR | Performed by: PSYCHIATRY & NEUROLOGY

## 2025-05-15 PROCEDURE — 99214 OFFICE O/P EST MOD 30 MIN: CPT | Performed by: PSYCHIATRY & NEUROLOGY

## 2025-05-15 NOTE — PROGRESS NOTES
"Neurology History and Physical    05/15/25  2:04 PM    Chief Complaint   Patient presents with    Migraine   migraine, abnormal MR brain imaging    HPI  Ivonne Giron is a 54 y.o. woman who presents today for evaluation of headaches. She has been previously seen by Tianna Fowler CNP and I have reviewed those records in detail. She was last seen by myself via telehealth in 2020. I have never seen her in person. Per prior records:\"Patient has greater than 15 days/month with headache.  Describes as frontal, 7-10 out of 10 in severity. The pain is throbbing and stabbing. It will hurt her so much that she hears ringing in her head. She can not wear her hair in a ponytail as it hurts. The headaches do last all day long. She is positive for sensitivity to light and sound. +nausea/vomiting. Medications used/failed: Botox, propanolol, losartan, tramadol, Fioricet, gabapentin, topamax (she has had kidney stones), amitriptyline, hives, failed triptans. She has not been taking emgality as she forgets to order the medication. The last time she had the emgality was a month ago. The headaches are less severe with the emgality. She had a migraine last week and this was the first migraine she has had in a long time. She states that she had to go to clinic to have treatment. She was given diclofenac to take for the migraines. She is not taking any tylenol or ibuprofen. She inquires if there is a pill similar to emgality that she could try. She underwent MR brain in the past which showed T2 hyperintensities in the right frontal, left frontal and right centrum semi-ovale. Dr. Grimm ordered CSF analysis. Does not appear that patient went through with this. Denies any transient neurological symptoms in the past. No hx of vision loss/optic neuritis, no hx of symptoms consistent with MS.\"    Returns today for follow-up.  Last MR brain was in March 2020. This showed nonspecific white matter changes that had worsened since 2008. She " did not have repeat imaging after last visit and did not complete CSF studies that Dr. Grimm had ordered in the past.     She states that gets headache every other day. They last 30 minutes or up to all day. They are sharp pain, +photophobia/phono, + nausea/vomiting, sometimes dizziness. No tearing, nasal congestion, rhinorrhea, etc. She is not taking anything for the pain. She sometimes feels dizzy with the headaches. She feels like she gets some blurred vision periodically. This resolves after she blinks a few times. Last eye exam was over a year ago. She tried fioricet, but does not help the headaches. She states that she used to be on emgality and was helpful. She had an allergic reaction with hives the last time she took it so stopped this. She has been off this for quite some time. She was on lyrica and tramadol and hydrocodone for chronic pain. Follows with pain clinic.     5/15/25  Ivonne Giron is a 54 year old female with chronic headaches who presents with persistent headaches despite treatment. She was referred by America Villagran at the pain management clinic for evaluation of her headache management.    She experiences headaches three to four times a week and has been taking Qulipta, which she does not find effective. Despite tolerating the medication, she continues to have headaches that necessitate lying in the dark. She occasionally uses caffeine pills and Tylenol, approximately five times a week, but finds them ineffective.    She continues to visit the pain clinic, but no recent changes have been made to her treatment plan. She mentioned a request from America Villagran at the pain management clinic for lab work, which is typically done every six months to a year, but she is unsure of the specifics.    She has a history of receiving Botox treatments for her headaches, which she received for thirteen years. She recalls an incident in December 2017 where she experienced dizziness and did not feel  well after a treatment, but there were no severe allergic reactions such as rashes or trouble breathing. The botox was very effective when she was using it in the past.     Her headaches are severe enough to cause her eyes to hurt, especially with exposure to light, and she often needs to lay in the dark. The pain can lead to nausea and vomiting, and she sometimes cannot eat. She also notes that even having her hair in a ponytail can exacerbate the pain.           Histories:    Medical:    Past Medical History:   Diagnosis Date    Abdominal pain, chronic, generalized     Accelerated essential hypertension     Chronic pain disorder     Intercostal neuropathic pain     Migraine     Pain     surgical sight pain since gallbladder surgery         Surgical:    Past Surgical History:   Procedure Laterality Date    ANKLE SURGERY Right 02/2020    CHOLECYSTECTOMY           Family:    Family History   Problem Relation Age of Onset    Arthritis Mother     Hypertension Father     Hypertension Other     Tuberculosis Other     Arthritis Other     Diabetes Other     Heart disease Other     Migraines Other     Migraines Mother's Sister          Social:    Social History     Socioeconomic History    Marital status: Single     Spouse name: Not on file    Number of children: Not on file    Years of education: Not on file    Highest education level: Not on file   Occupational History    Not on file   Tobacco Use    Smoking status: Never    Smokeless tobacco: Never   Vaping Use    Vaping status: Never Used   Substance and Sexual Activity    Alcohol use: No    Drug use: No    Sexual activity: Defer   Other Topics Concern    Not on file   Social History Narrative    Not on file     Social Drivers of Health     Financial Resource Strain: Not on file   Food Insecurity: No Food Insecurity (12/16/2024)    Hunger Vital Sign     Worried About Running Out of Food in the Last Year: Never true     Ran Out of Food in the Last Year: Never true    Transportation Needs: No Transportation Needs (12/16/2024)    PRAPARE - Transportation     Lack of Transportation (Medical): No     Lack of Transportation (Non-Medical): No   Physical Activity: Not on file   Stress: Not on file   Social Connections: Not on file   Intimate Partner Violence: Not At Risk (12/16/2024)    Humiliation, Afraid, Rape, and Kick questionnaire     Fear of Current or Ex-Partner: No     Emotionally Abused: No     Physically Abused: No     Sexually Abused: No   Housing Stability: Low Risk  (12/16/2024)    Housing Stability Vital Sign     Unable to Pay for Housing in the Last Year: No     Number of Times Moved in the Last Year: 0     Homeless in the Last Year: No       Allergies:    Allergies   Allergen Reactions    Indomethacin      HIVES    Prednisone      N/V    Sulfa (Sulfonamide Antibiotics)      HIVES       Current Medications:    Current Outpatient Medications   Medication Sig Dispense Refill    atogepant (Qulipta) 60 mg tablet TAKE 1 TABLET BY MOUTH ONCE DAILY APPOINTMENT  REQUIRED  FOR  FUTURE  REFILLS 30 tablet 3    HYDROcodone-acetaminophen (NORCO) 5-325 mg per tablet Take 1 tablet by mouth every 6 (six) hours as needed (for pain for 28 days) Max Daily Amount: 4 tablets      methocarbamoL (ROBAXIN) 500 mg tablet Take 1 tablet (500 mg total) by mouth every 8 (eight) hours as needed for muscle spasms      traMADol (ULTRAM 50) 50 mg tablet Take 1 tablet (50 mg total) by mouth every 6 (six) hours as needed (for pain) Max Daily Amount: 200 mg      alendronate (FOSAMAX) 70 mg tablet Take 1 tablet (70 mg total) by mouth every 7 (seven) days Take in AM with a full glass of water, on an empty stomach. Do not take anything else by mouth or lie down for 30 min.      ondansetron ODT (ZOFRAN-ODT) 4 mg disintegrating tablet Take 1 tablet (4 mg total) by mouth every 8 (eight) hours as needed for nausea or vomiting 20 tablet 2    butalbital-acetaminophen-caff (FIORICET, ESGIC) -40 mg Take 1  tablet by mouth every 4 (four) hours as needed Max of 4 a day and no morer than 4 times a week.      aspirin 81 mg EC tablet Take 1 tablet (81 mg total) by mouth daily (Patient not taking: Reported on 5/15/2025) 30 tablet 0    spironolactone (ALDACTONE) 25 mg tablet Take 1 tablet (25 mg total) by mouth daily (Patient not taking: Reported on 5/15/2025) 30 tablet 0    sucralfate (CARAFATE) 1 gram tablet Take 1 tablet (1 g total) by mouth 2 (two) times a day on an empty stomach (Patient not taking: Reported on 5/15/2025)      cetirizine (ZyrTEC) 10 mg tablet Take 1 tablet (10 mg total) by mouth as needed (Patient not taking: Reported on 5/15/2025)      atorvastatin (LIPITOR) 40 mg tablet Take 1 tablet (40 mg total) by mouth nightly (Patient not taking: Reported on 5/15/2025)      calcium carbonate-vitamin D3 500 mg-10 mcg (400 unit) tablet Take 1 tablet by mouth daily.   (Patient not taking: Reported on 5/15/2025)       No current facility-administered medications for this visit.       Review of Systems:  See HPI. All other systems were reviewed and were negative.     OBJECTIVE    Heart Rate:  [72] 72  Resp:  [16] 16  BP: (140)/(100) 140/100      Physical Exam:    Alert and oriented. Speech and language is normal. Face is symmetric. Moving all extremities equally. Walks with normal station and gait.      Labs:      CBC with Platelet:    Lab Results   Component Value Date    WBC 9.3 12/18/2024    HGB 13.6 12/18/2024    HCT 39.7 12/18/2024     12/18/2024    RBC 4.69 12/18/2024    MCV 84.6 12/18/2024    MCH 29.1 12/18/2024    MCHC 34.3 12/18/2024    RDW 13.8 12/18/2024    MPV 6.5 (L) 12/18/2024     Automated Differential:   Lab Results   Component Value Date    NEUTROABS 5.90 12/18/2024    LYMPHOPCT 27.1 12/18/2024    MONOPCT 4.5 12/18/2024    MONOSABS 0.40 12/18/2024    EOSPCT 3.8 (H) 12/18/2024    EOSABS 0.40 12/18/2024    BASOSABS 0.10 12/18/2024    BASOPCT 1.1 12/18/2024     Absolute Count:    Lab Results  "  Component Value Date    NEUTROABS 5.90 12/18/2024    LYMPHSABS 2.50 12/18/2024    EOSABS 0.40 12/18/2024     Comp:   Lab Results   Component Value Date     12/18/2024    K 3.6 12/18/2024     12/18/2024    CO2 30 12/18/2024    BUN 11 12/18/2024    CREATININE 0.56 (L) 12/18/2024    GLUCOSE 96 12/18/2024    CALCIUM 8.9 12/18/2024    PROT 7.7 12/15/2024    ALBUMIN 4.0 12/15/2024    AST 14 12/15/2024    ALT 13 12/15/2024    ALKPHOS 110 (H) 12/15/2024    BILITOT 0.47 12/15/2024     Lipid: No results found for: \"CHOL\", \"TRIG\", \"HDLC\", \"LDLC\", \"HDL\", \"LDL\"  TSH: No results found for: \"TSH\"    Imaging studies:    EXAM:   MRI of the brain without contrast from 03/23/2020     CLINICAL HISTORY:   Intractable migraine without aura and without status migrainosus; Worsing headaches.     COMPARISON(s):   2/13/2008     TECHNIQUE:   Sagittal and axial T1, and axial T2, FLAIR, and diffusion-weighted sequences were obtained through the brain.     FINDINGS:   Focal FLAIR hyperintense left extra-axial structure (series 4, image 17), incompletely evaluated without IV contrast but probable small meningioma.  Multifocal white matter hyperintensities involving the periventricular, deep and subcortical white matter, nonspecific but likely secondary to chronic ischemic white matter disease. The diffusion weighted images demonstrate no evidence of acute infarct. There is no hydrocephalus, acute hemorrhage, mass effect, midline shift, or extra axial fluid collection.Midline structures are within normal limits. The paranasal sinuses are clear..         IMPRESSION:  1. No acute infarct.  2. Multifocal white matter hyperintensities involving the periventricular, deep and subcortical white matter, nonspecific but likely secondary to chronic ischemic white matter disease. This is worsened since 2008.    EXAM:  MR BRAIN W AND WO CONTRAST     DATE: 12/16/2024 6:36 AM     INDICATION:  history of abnormal mri brain  now with L side " tingling     COMPARISON: None available.      Technique:  Precontrast and postcontrast MR images of the brain.  CONTRAST: 20 mL of prohance intravenous contrast was administered from a single use vial. 0 mL of contrast was discarded.     Findings:  Sequela of mild chronic small vessel ischemic disease or other chronic insult. No acute intracranial pathology  Suspected tiny left frontal convexity calcified meningioma although stable since prior exams. No midline shift or hydrocephalus. Gray-white distinction is maintained. Vascular flow voids maintained. Cervical medullary junction appears normal. There is no diffusion restriction or acute ischemic change. Visualized paranasal sinuses appear clear.        IMPRESSION:  Sequela of mild chronic small vessel ischemic disease or other chronic insult. No acute intracranial pathology      EXAM:  MR CERVICAL SPINE WO CONTRAST     DATE: 12/17/2024 8:32 AM     INDICATION:  left arm numbness/weakness. evaluate for radiculopathy     COMPARISON: 12/15/2024     TECHNIQUE:  MR images of the cervical spine were obtained without intravenous contrast.      FINDINGS:  Straightened cervical lordosis. No spondylolisthesis. No compression deformities or other acute fractures.     The soft tissues are without emergent findings.     Normal cord caliber and signal intensity.     Level by level:  C2-C3: No spinal canal or foraminal stenosis.  C3-C4: Facet arthropathy. No spinal canal or foraminal stenosis.  C4-C5: Shallow disc bulge. Facet arthropathy. No spinal canal stenosis. Mild to moderate left foraminal stenosis.  C5-C6: Disc bulge. Uncovertebral and facet arthropathy. No spinal canal stenosis. Moderate left foraminal stenosis.  C6-C7: Disc bulge. Uncovertebral and facet arthropathy. No spinal canal stenosis. Mild bilateral foraminal stenosis.  C7-T1: Facet arthropathy. No spinal canal or foraminal stenosis.        IMPRESSION:  Cervical spondylotic changes, with mild to moderate  left-sided foraminal stenoses, detailed above. No significant spinal canal stenosis    EXAM:  CT ANGIOGRAM HEAD AND NECK STROKE ALERT     DATE: 12/15/2024 10:26 PM     INDICATION:  Suspected stroke; Suspected stroke     COMPARISON: Head CT today.      TECHNIQUE:  Postcontrast CTA images of the head and neck. 3D/MIP images were generated and reviewed. Characterization of carotid stenosis was accomplished utilizing NASCET criteria. Dose reduction technique utilized; automatic/anatomic modulation of X-ray tube current (Auto mA).  CONTRAST: 80 mL of Isovue-370 was administered intravenously from a multiuse vial.     FINDINGS:        CTA of the head:  Patent bilateral intracranial internal carotid arteries. Patent bilateral anterior cerebral and middle cerebral arteries. The anterior communicating artery is present.            Codominant V4 segments. Patent basilar and bilateral posterior cerebral arteries.      CTA of the neck:  Two vessel arch with common origin of the brachiocephalic and left common carotid artery.1 No flow significant stenosis in the proximal great vessels.      No flow significant stenosis in the right common or cervical internal carotid artery.      No flow significant stenosis in the left common or cervical internal carotid artery.      Patent cervical vertebral arteries, codominant.      The soft tissues of the neck and skull base demonstrate no masses or areas of abnormal enhancement. The visualized lung apices are clear.        IMPRESSION:  1.  No large vessel occlusion, aneurysm, dissection, or critical stenosis in the head or neck.    Assessment:    Problem List Items Addressed This Visit          Neuro    Abnormal finding on MRI of brain       Symptoms and Signs    Suspected cerebrovascular accident (CVA) - Primary             Plan:   No orders of the defined types were placed in this encounter.      Assessment/Plan   Assessment/Plan     Chronic Headache  Chronic migraine headaches without  aura with >15 headache days per month, lasting >4 hrs and >50% meeting criteria for migraine.  She meets criteria for botox injections for chronic migraine and She is a good candidate for the injections. Risks and benefits as well as alternatives discussed with patient in detail.  She would like to proceed. Will get insurance approval and then schedule.   Chronic headaches occur three to four times weekly, significantly impacting daily activities. Current treatment with Qulipta is ineffective. Symptoms include photophobia, nausea, and occasional vomiting. Previous Botox treatment provided relief and was administered for thirteen years, discontinued due to am episode of feeling funny after one step of injections. No allergic symptoms. No significant adverse reactions to Botox were noted, aside from dizziness. She wishes to reduce medication intake and is interested in revisiting Botox treatment.  - Obtain authorization for Botox treatment.  - Discontinue Qulipta once Botox treatment is initiated.     F/u for botox as soon as auth is completed.         2. Abnormal MR brain - stable in Dec 2024      A voice recognition program was used to aid in documentation of the record. Sometimes words are not printed exactly as they were spoken. While efforts were made to carefully edit and correct any inaccuracies, some may be present; please take these into context. Please contact the provider if areas are identified.     The diagnostic assessment has been reviewed. Patient and/or patient's surrogate has expressed a good understanding of the assessment and recommendations from today's visit. There are no apparent barriers to understanding this information. Patient's questions were addressed. Patient has been advised to contact this office or the answering service with questions or concerns that may arise. Patient has been advised to follow up with Primary Care Provider for general medical needs.     Nicolette Avila MD

## 2025-07-10 ENCOUNTER — TELEPHONE (OUTPATIENT)
Dept: FAMILY MEDICINE | Facility: CLINIC | Age: 55
End: 2025-07-10
Payer: COMMERCIAL

## 2025-07-10 DIAGNOSIS — M81.0 AGE-RELATED OSTEOPOROSIS WITHOUT CURRENT PATHOLOGICAL FRACTURE: Primary | ICD-10-CM

## 2025-07-10 NOTE — TELEPHONE ENCOUNTER
Bone Clinic scheduled with Dr. Moore - please place orders - thank you     Bone Clinic - referral from [facility] - [facility] [place]     DXA - [facility] 3-28-25 with lowest T-score of -3.1 in left femoral neck     History of alendronate July 2024-current   *referral stated failing treatment*    History of ankle fracture after 49 - ice  Both writs prior to 49    LABS   VIT D 1-24-25  CmP 1-24-25    Please place additional labs to be drawn at [facility] by 7-18-25 will fax orders to [phone]    No charge DXA/Consult - 7-25-25

## 2025-07-22 ENCOUNTER — TELEPHONE (OUTPATIENT)
Dept: ENDOCRINOLOGY | Facility: CLINIC | Age: 55
End: 2025-07-22
Payer: COMMERCIAL

## 2025-07-22 DIAGNOSIS — M81.8 AGE-RELATED OSTEOPOROSIS WITHOUT FRACTURE: Primary | ICD-10-CM

## 2025-08-08 ENCOUNTER — TELEPHONE (OUTPATIENT)
Dept: RHEUMATOLOGY | Facility: CLINIC | Age: 55
End: 2025-08-08
Payer: COMMERCIAL

## 2025-08-08 ENCOUNTER — LAB (OUTPATIENT)
Dept: LAB | Facility: CLINIC | Age: 55
End: 2025-08-08
Payer: COMMERCIAL

## 2025-08-08 ENCOUNTER — ANCILLARY PROCEDURE (OUTPATIENT)
Dept: BONE DENSITY | Facility: CLINIC | Age: 55
End: 2025-08-08
Payer: COMMERCIAL

## 2025-08-08 ENCOUNTER — OFFICE VISIT (OUTPATIENT)
Dept: ENDOCRINOLOGY | Facility: CLINIC | Age: 55
End: 2025-08-08
Payer: COMMERCIAL

## 2025-08-08 VITALS
HEART RATE: 62 BPM | HEIGHT: 68 IN | RESPIRATION RATE: 16 BRPM | DIASTOLIC BLOOD PRESSURE: 73 MMHG | SYSTOLIC BLOOD PRESSURE: 132 MMHG | WEIGHT: 221.8 LBS | BODY MASS INDEX: 33.62 KG/M2 | OXYGEN SATURATION: 96 %

## 2025-08-08 DIAGNOSIS — M81.0 AGE-RELATED OSTEOPOROSIS WITHOUT CURRENT PATHOLOGICAL FRACTURE: ICD-10-CM

## 2025-08-08 DIAGNOSIS — M81.8 AGE-RELATED OSTEOPOROSIS WITHOUT FRACTURE: ICD-10-CM

## 2025-08-08 DIAGNOSIS — M81.8 OTHER OSTEOPOROSIS WITHOUT CURRENT PATHOLOGICAL FRACTURE: ICD-10-CM

## 2025-08-08 DIAGNOSIS — M81.0 AGE-RELATED OSTEOPOROSIS WITHOUT CURRENT PATHOLOGICAL FRACTURE: Primary | ICD-10-CM

## 2025-08-08 LAB
ALBUMIN SERPL-MCNC: 4.2 G/DL (ref 3.5–5.3)
ALP SERPL-CCNC: 119 U/L (ref 41–108)
ALT SERPL-CCNC: 15 U/L (ref 7–52)
ANION GAP SERPL CALC-SCNC: 7 MMOL/L (ref 3–11)
AST SERPL-CCNC: 14 U/L
BILIRUB SERPL-MCNC: 0.52 MG/DL (ref 0.2–1.4)
BUN SERPL-MCNC: 6 MG/DL (ref 7–25)
CALCIUM ALBUM COR SERPL-MCNC: 8.6 MG/DL (ref 8.6–10.3)
CALCIUM SERPL-MCNC: 8.8 MG/DL (ref 8.6–10.3)
CHLORIDE SERPL-SCNC: 105 MMOL/L (ref 98–107)
CO2 SERPL-SCNC: 30 MMOL/L (ref 21–32)
CREAT SERPL-MCNC: 0.58 MG/DL (ref 0.6–1.1)
EGFRCR SERPLBLD CKD-EPI 2021: 107 ML/MIN/1.73M*2
GLUCOSE SERPL-MCNC: 93 MG/DL (ref 70–105)
PHOSPHATE SERPL-MCNC: 2.8 MG/DL (ref 2.5–4.9)
POTASSIUM SERPL-SCNC: 3 MMOL/L (ref 3.5–5.1)
PROT SERPL-MCNC: 7.7 G/DL (ref 6–8.3)
PTH-INTACT SERPL-MCNC: 81.1 PG/ML (ref 9–59)
SODIUM SERPL-SCNC: 142 MMOL/L (ref 135–145)
SPC10S DXA LSC FEMORAL NECK MEASUREMENT: 0.02 G/CM2
SPC10S DXA LSC HIP MEASUREMENT: 0.02 G/CM2
SPC10S LSC LUMBAR MEASUREMENT: 0.03 G/CM2

## 2025-08-08 PROCEDURE — 83970 ASSAY OF PARATHORMONE: CPT | Performed by: STUDENT IN AN ORGANIZED HEALTH CARE EDUCATION/TRAINING PROGRAM

## 2025-08-08 PROCEDURE — 77080 DXA BONE DENSITY AXIAL: CPT | Mod: NC

## 2025-08-08 RX ORDER — FAMOTIDINE 20 MG/1
20 TABLET, FILM COATED ORAL AS NEEDED
OUTPATIENT
Start: 2025-08-08

## 2025-08-08 RX ORDER — ALBUTEROL SULFATE 0.83 MG/ML
2.5 SOLUTION RESPIRATORY (INHALATION) AS NEEDED
OUTPATIENT
Start: 2025-08-08

## 2025-08-08 RX ORDER — EPINEPHRINE 1 MG/ML
0.3 INJECTION INTRAMUSCULAR; INTRAVENOUS; SUBCUTANEOUS AS NEEDED
OUTPATIENT
Start: 2025-08-08

## 2025-08-08 RX ORDER — DIPHENHYDRAMINE HYDROCHLORIDE 50 MG/ML
25-50 INJECTION, SOLUTION INTRAMUSCULAR; INTRAVENOUS AS NEEDED
OUTPATIENT
Start: 2025-08-08

## 2025-08-08 RX ORDER — ACETAMINOPHEN 500 MG
500 TABLET ORAL AS NEEDED
OUTPATIENT
Start: 2025-08-08

## 2025-08-08 RX ORDER — DIPHENHYDRAMINE HCL 25 MG
25 CAPSULE ORAL AS NEEDED
OUTPATIENT
Start: 2025-08-08

## 2025-08-08 RX ORDER — SODIUM CHLORIDE 9 MG/ML
0-999 INJECTION, SOLUTION INTRAVENOUS CONTINUOUS PRN
OUTPATIENT
Start: 2025-08-08 | End: 2025-08-09

## 2025-08-08 RX ORDER — FAMOTIDINE 10 MG/ML
20 INJECTION, SOLUTION INTRAVENOUS AS NEEDED
OUTPATIENT
Start: 2025-08-08

## 2025-08-08 ASSESSMENT — PAIN SCALES - GENERAL: PAINLEVEL_OUTOF10: 0-NO PAIN

## 2025-08-09 LAB
ALP BONE SERPL-MCNC: 23 MCG/L
CEFOTAXIME ISLT MIC: 626 PG/ML

## 2025-08-11 LAB — PINP SER-MCNC: 53 MCG/L

## 2025-08-12 LAB — 1,25(OH)2D SERPL-MCNC: 76 PG/ML (ref 18–78)

## 2025-08-16 DIAGNOSIS — G43.019 INTRACTABLE MIGRAINE WITHOUT AURA AND WITHOUT STATUS MIGRAINOSUS: Primary | ICD-10-CM

## 2025-08-19 ENCOUNTER — TELEPHONE (OUTPATIENT)
Dept: NEUROLOGY | Facility: CLINIC | Age: 55
End: 2025-08-19
Payer: COMMERCIAL